# Patient Record
Sex: FEMALE | Race: BLACK OR AFRICAN AMERICAN | Employment: OTHER | URBAN - METROPOLITAN AREA
[De-identification: names, ages, dates, MRNs, and addresses within clinical notes are randomized per-mention and may not be internally consistent; named-entity substitution may affect disease eponyms.]

---

## 2017-04-05 DIAGNOSIS — N18.30 TYPE 2 DIABETES MELLITUS WITH STAGE 3 CHRONIC KIDNEY DISEASE, WITHOUT LONG-TERM CURRENT USE OF INSULIN (H): ICD-10-CM

## 2017-04-05 DIAGNOSIS — E11.22 TYPE 2 DIABETES MELLITUS WITH STAGE 3 CHRONIC KIDNEY DISEASE, WITHOUT LONG-TERM CURRENT USE OF INSULIN (H): ICD-10-CM

## 2017-04-05 LAB
ANION GAP SERPL CALCULATED.3IONS-SCNC: 10 MMOL/L (ref 3–14)
BUN SERPL-MCNC: 21 MG/DL (ref 7–30)
CALCIUM SERPL-MCNC: 9.8 MG/DL (ref 8.5–10.1)
CHLORIDE SERPL-SCNC: 106 MMOL/L (ref 94–109)
CHOLEST SERPL-MCNC: 161 MG/DL
CO2 SERPL-SCNC: 24 MMOL/L (ref 20–32)
CREAT SERPL-MCNC: 1.16 MG/DL (ref 0.52–1.04)
GFR SERPL CREATININE-BSD FRML MDRD: 46 ML/MIN/1.7M2
GLUCOSE SERPL-MCNC: 142 MG/DL (ref 70–99)
HBA1C MFR BLD: 7 % (ref 4.3–6)
HDLC SERPL-MCNC: 65 MG/DL
LDLC SERPL CALC-MCNC: 81 MG/DL
NONHDLC SERPL-MCNC: 96 MG/DL
POTASSIUM SERPL-SCNC: 4.6 MMOL/L (ref 3.4–5.3)
SODIUM SERPL-SCNC: 140 MMOL/L (ref 133–144)
TRIGL SERPL-MCNC: 76 MG/DL

## 2017-04-05 PROCEDURE — 80048 BASIC METABOLIC PNL TOTAL CA: CPT | Performed by: FAMILY MEDICINE

## 2017-04-05 PROCEDURE — 83036 HEMOGLOBIN GLYCOSYLATED A1C: CPT | Performed by: FAMILY MEDICINE

## 2017-04-05 PROCEDURE — 36415 COLL VENOUS BLD VENIPUNCTURE: CPT | Performed by: FAMILY MEDICINE

## 2017-04-05 PROCEDURE — 80061 LIPID PANEL: CPT | Performed by: FAMILY MEDICINE

## 2017-04-07 ENCOUNTER — OFFICE VISIT (OUTPATIENT)
Dept: INTERNAL MEDICINE | Facility: CLINIC | Age: 77
End: 2017-04-07
Payer: COMMERCIAL

## 2017-04-07 VITALS
BODY MASS INDEX: 35.89 KG/M2 | WEIGHT: 182.8 LBS | HEIGHT: 60 IN | TEMPERATURE: 98.2 F | DIASTOLIC BLOOD PRESSURE: 80 MMHG | SYSTOLIC BLOOD PRESSURE: 132 MMHG | HEART RATE: 89 BPM | OXYGEN SATURATION: 98 %

## 2017-04-07 DIAGNOSIS — I10 ESSENTIAL HYPERTENSION, BENIGN: ICD-10-CM

## 2017-04-07 DIAGNOSIS — K21.9 GASTROESOPHAGEAL REFLUX DISEASE WITHOUT ESOPHAGITIS: ICD-10-CM

## 2017-04-07 DIAGNOSIS — E11.22 TYPE 2 DIABETES MELLITUS WITH STAGE 3 CHRONIC KIDNEY DISEASE, WITHOUT LONG-TERM CURRENT USE OF INSULIN (H): Primary | ICD-10-CM

## 2017-04-07 DIAGNOSIS — N18.30 TYPE 2 DIABETES MELLITUS WITH STAGE 3 CHRONIC KIDNEY DISEASE, WITHOUT LONG-TERM CURRENT USE OF INSULIN (H): Primary | ICD-10-CM

## 2017-04-07 DIAGNOSIS — R20.2 PARESTHESIA OF LEFT FOOT: ICD-10-CM

## 2017-04-07 DIAGNOSIS — N18.30 TYPE 2 DIABETES MELLITUS WITH STAGE 3 CHRONIC KIDNEY DISEASE (H): ICD-10-CM

## 2017-04-07 DIAGNOSIS — E78.5 HYPERLIPIDEMIA LDL GOAL <100: ICD-10-CM

## 2017-04-07 DIAGNOSIS — N18.30 CKD (CHRONIC KIDNEY DISEASE) STAGE 3, GFR 30-59 ML/MIN (H): ICD-10-CM

## 2017-04-07 DIAGNOSIS — E11.22 TYPE 2 DIABETES MELLITUS WITH STAGE 3 CHRONIC KIDNEY DISEASE (H): ICD-10-CM

## 2017-04-07 DIAGNOSIS — E66.01 MORBID OBESITY, UNSPECIFIED OBESITY TYPE (H): ICD-10-CM

## 2017-04-07 PROCEDURE — 99214 OFFICE O/P EST MOD 30 MIN: CPT | Performed by: INTERNAL MEDICINE

## 2017-04-07 RX ORDER — PRAVASTATIN SODIUM 40 MG
40 TABLET ORAL DAILY
Qty: 90 TABLET | Refills: 3 | Status: SHIPPED | OUTPATIENT
Start: 2017-04-07 | End: 2017-11-02

## 2017-04-07 RX ORDER — GLIMEPIRIDE 2 MG/1
TABLET ORAL
Qty: 135 TABLET | Refills: 0 | Status: CANCELLED | OUTPATIENT
Start: 2017-04-07

## 2017-04-07 RX ORDER — PANTOPRAZOLE SODIUM 40 MG/1
40 TABLET, DELAYED RELEASE ORAL DAILY
Qty: 90 TABLET | Refills: 3 | Status: SHIPPED | OUTPATIENT
Start: 2017-04-07 | End: 2017-11-02

## 2017-04-07 RX ORDER — GLIMEPIRIDE 4 MG/1
4 TABLET ORAL
Qty: 90 TABLET | Refills: 3 | Status: SHIPPED | OUTPATIENT
Start: 2017-04-07 | End: 2017-10-09

## 2017-04-07 RX ORDER — METOPROLOL SUCCINATE 50 MG/1
50 TABLET, EXTENDED RELEASE ORAL DAILY
Qty: 90 TABLET | Refills: 3 | Status: SHIPPED | OUTPATIENT
Start: 2017-04-07 | End: 2017-11-02

## 2017-04-07 RX ORDER — BLOOD-GLUCOSE METER
KIT MISCELLANEOUS
Qty: 1 KIT | Status: CANCELLED | OUTPATIENT
Start: 2017-04-07

## 2017-04-07 RX ORDER — PIOGLITAZONEHYDROCHLORIDE 15 MG/1
15 TABLET ORAL DAILY
Qty: 90 TABLET | Refills: 3 | Status: SHIPPED | OUTPATIENT
Start: 2017-04-07 | End: 2017-11-02

## 2017-04-07 RX ORDER — AMLODIPINE AND BENAZEPRIL HYDROCHLORIDE 5; 20 MG/1; MG/1
1 CAPSULE ORAL DAILY
Qty: 90 CAPSULE | Refills: 3 | Status: SHIPPED | OUTPATIENT
Start: 2017-04-07 | End: 2017-11-02

## 2017-04-07 NOTE — PROGRESS NOTES
SUBJECTIVE:                                                    Janay Kyle is a 76 year old female who presents to clinic today for the following health issues:      Diabetes Follow-up    Patient is checking blood sugars: once daily.  Results are as follows:         am - 120-145. Taking glimepiride 4 mg in [place], no lows    Diabetic concerns: None     Symptoms of hypoglycemia (low blood sugar): none     Paresthesias (numbness or burning in feet) or sores: Yes burning left foot inner part      Date of last diabetic eye exam: completed in Sept     Hypertension Follow-up      Outpatient blood pressures are not being checked.    Low Salt Diet: no added salt       Amount of exercise or physical activity: walk     Problems taking medications regularly: No    Medication side effects: none    Diet: regular (no restrictions)      Other problems:   1. CKD: had jump in creatinine. She was in [place] for winter, was taking verapamil 20 mg once daily there.  2. Morbid obesity: no change in weight      Current concerns:   1. Pain left foot: this is a burning sensation that is only at the instep for a few weeks. It is not related to walking. She does not wear shoes in the house. Not in toes. Not up leg.       Patient Active Problem List   Diagnosis     Essential hypertension, benign     Dermatophytosis of nail     Esophageal reflux     Hip pain     HYPERLIPIDEMIA LDL GOAL <100     CKD (chronic kidney disease) stage 3, GFR 30-59 ml/min     Benign paroxysmal positional vertigo     Type 2 diabetes mellitus with diabetic chronic kidney disease (H)     Morbid obesity (H)     Type 2 diabetes mellitus with stage 3 chronic kidney disease, without long-term current use of insulin (H)       Current Outpatient Prescriptions   Medication Sig Dispense Refill     glimepiride (AMARYL) 2 MG tablet 1.5 tabs (3 mg) daily 135 tablet 0     blood glucose monitoring (FREESTYLE FREEDOM LITE) meter device kit Use to test blood sugars once daily as  directed. 1 kit PRN     acetaminophen (TYLENOL) 325 MG tablet Take 325-650 mg by mouth every 6 hours as needed for mild pain       pantoprazole (PROTONIX) 40 MG enteric coated tablet Take 1 tablet (40 mg) by mouth daily 90 tablet 0     pioglitazone (ACTOS) 15 MG tablet Take 1 tablet (15 mg) by mouth daily 90 tablet 0     metoprolol (TOPROL-XL) 50 MG 24 hr tablet Take 1 tablet (50 mg) by mouth daily 90 tablet 0     amLODIPine-benazepril (LOTREL) 5-20 MG per capsule Take 1 capsule by mouth daily 90 capsule 0     pravastatin (PRAVACHOL) 40 MG tablet Take 1 tablet (40 mg) by mouth daily 90 tablet 0     Ascorbic Acid (VITAMIN C CR) 1000 MG TBCR Take 1 tablet by mouth daily        aspirin 81 MG tablet Take 1 tablet (81 mg) by mouth daily       Calcium Citrate-Vitamin D (CALCIUM CITRATE + D) 315-200 MG-UNIT TABS Take 1 tablet by mouth 2 times daily.       Multiple Vitamin (DAILY MULTIVITAMIN PO) Take 1 tablet by mouth daily.         Social History   Substance Use Topics     Smoking status: Former Smoker     Smokeless tobacco: Never Used      Comment: quit 1995     Alcohol use No        ROS:  General: no fever, chills  Weight: stable  Vision:negative. Last eye exam 9/16.  ENT: negative  Respiratory negative.  Cardiac: no chest pain or pressure  Abdominal: no nausea, vomiting, abdominal pain, bowel changes  Vascular no complaints of claudication  Neurologic:on complaints of neuropathy  Feet no lesions, in grown nails, edema   : no polyuria, hematuria, dysuria    Objective:  Patient alert in NAD  /80 (BP Location: Right arm, Patient Position: Chair, Cuff Size: Adult Large)  Pulse 89  Temp 98.2  F (36.8  C) (Oral)  Ht 5' (1.524 m)  Wt 182 lb 12.8 oz (82.9 kg)  SpO2 98%  BMI 35.7 kg/m2       Wt Readings from Last 4 Encounters:   04/07/17 182 lb 12.8 oz (82.9 kg)   09/07/16 185 lb 11.2 oz (84.2 kg)   08/29/16 184 lb (83.5 kg)   08/13/16 181 lb (82.1 kg)       CV: CV: normal S1, S2 without murmur, S3 or S4.  Carotid  pulses: full  LUNGS: clear    Lab Results   Component Value Date    A1C 7.0 04/05/2017    A1C 7.1 09/20/2016    A1C 7.0 03/17/2016    A1C 7.0 09/14/2015    A1C 6.8 03/24/2015    A1C 6.8 09/23/2014    A1C 7.0 03/20/2014     Lab Results   Component Value Date    CHOL 161 04/05/2017    HDL 65 04/05/2017    LDL 81 04/05/2017    TRIG 76 04/05/2017    CHOLHDLRATIO 2.3 03/24/2015       ASSESSMENT:   (E11.22,  N18.3) Type 2 diabetes mellitus with stage 3 chronic kidney disease, without long-term current use of insulin (H)  (primary encounter diagnosis)  Comment: borderline control,   Plan: pioglitazone (ACTOS) 15 MG tablet, blood         glucose monitoring (NO BRAND SPECIFIED) test         strip, glimepiride (AMARYL) 4 MG tablet, blood         glucose monitoring (FREESTYLE LITE) test strip            (E66.01) Morbid obesity, unspecified obesity type (H)  Comment: stable  Plan: increase exercise    (I10) BENIGN HYPERTENSION  Comment: controlled but advised if she take the verapamil should take bid  Plan: metoprolol (TOPROL-XL) 50 MG 24 hr tablet,         amLODIPine-benazepril (LOTREL) 5-20 MG per         capsule, Creatinine            (N18.3) CKD (chronic kidney disease) stage 3, GFR 30-59 ml/min  Comment: increased creatinine, recheck in 3 weeks  Plan: Creatinine            (E78.5) Hyperlipidemia LDL goal <100  Comment: cotnrolled  Plan: pravastatin (PRAVACHOL) 40 MG tablet            (K21.9) Gastroesophageal reflux disease without esophagitis  Comment: stable  Plan: pantoprazole (PROTONIX) 40 MG EC tablet            (R20.2) Paresthesia of left foot  Comment: not likely diabetic, may be tarsal tunnel  Plan: wear shoes, refer podiatry if not improved    Brandi Rai MD  [facility]

## 2017-04-07 NOTE — NURSING NOTE
Chief Complaint   Patient presents with     Hypertension     follow up labs were done 4/5/17- also needs refills (pended for apporval)      Diabetes     follow up labs were done 4/5/17- also needs refills (pended for apporval)        Initial /80 (BP Location: Right arm, Patient Position: Chair, Cuff Size: Adult Large)  Pulse 89  Temp 98.2  F (36.8  C) (Oral)  Ht 5' (1.524 m)  Wt 182 lb 12.8 oz (82.9 kg)  SpO2 98%  BMI 35.7 kg/m2 Estimated body mass index is 35.7 kg/(m^2) as calculated from the following:    Height as of this encounter: 5' (1.524 m).    Weight as of this encounter: 182 lb 12.8 oz (82.9 kg).  Medication Reconciliation: complete

## 2017-04-07 NOTE — TELEPHONE ENCOUNTER
Need a new prescription for one touch ultra meter per insurance formulary.    Thank you,  Kae Galvin [facility]  [phone]

## 2017-04-07 NOTE — MR AVS SNAPSHOT
"              After Visit Summary   4/7/2017    Janay Kyle    MRN: [id number]           Patient Information     Date Of Birth          1940        Visit Information        Provider Department      4/7/2017 10:20 AM Brandi Rai MD [facility]        Today's Diagnoses     Type 2 diabetes mellitus with stage 3 chronic kidney disease, without long-term current use of insulin (H)        Gastroesophageal reflux disease without esophagitis        BENIGN HYPERTENSION        Hyperlipidemia LDL goal <100           Follow-ups after your visit        Follow-up notes from your care team     Return in about 3 weeks (around 4/28/2017) for Lab Work.      Who to contact     If you have questions or need follow up information about today's clinic visit or your schedule please contact [facility] directly at [phone].  Normal or non-critical lab and imaging results will be communicated to you by MyChart, letter or phone within 4 business days after the clinic has received the results. If you do not hear from us within 7 days, please contact the clinic through MyChart or phone. If you have a critical or abnormal lab result, we will notify you by phone as soon as possible.  Submit refill requests through Fr[employer]i or call your pharmacy and they will forward the refill request to us. Please allow 3 business days for your refill to be completed.          Additional Information About Your Visit        MyChart Information     Frameri lets you send messages to your doctor, view your test results, renew your prescriptions, schedule appointments and more. To sign up, go to [email or web address] . Click on \"Log in\" on the left side of the screen, which will take you to the Welcome page. Then click on \"Sign up Now\" on the right side of the page.     You will be asked to enter the access code listed below, as well as some personal information. Please follow the directions to create your username " and password.     Your access code is: [email or web address]  Expires: 2017 11:14 AM     Your access code will  in 90 days. If you need help or a new code, please call your [place] clinic or [phone].        Care EveryWhere ID     This is your Care EveryWhere ID. This could be used by other organizations to access your [place] medical records  [id number]        Your Vitals Were     Pulse Temperature Height Pulse Oximetry BMI (Body Mass Index)       89 98.2  F (36.8  C) (Oral) 5' (1.524 m) 98% 35.7 kg/m2        Blood Pressure from Last 3 Encounters:   17 132/80   16 129/77   16 110/70    Weight from Last 3 Encounters:   17 182 lb 12.8 oz (82.9 kg)   16 185 lb 11.2 oz (84.2 kg)   16 184 lb (83.5 kg)              Today, you had the following     No orders found for display         Today's Medication Changes          These changes are accurate as of: 17 11:14 AM.  If you have any questions, ask your nurse or doctor.               Start taking these medicines.        Dose/Directions    blood glucose monitoring test strip   Commonly known as:  no brand specified   Used for:  Type 2 diabetes mellitus with stage 3 chronic kidney disease, without long-term current use of insulin (H)   Started by:  Brandi Rai MD        Use to test blood sugars 1 times daily or as directed   Quantity:  100 strip   Refills:  3         These medicines have changed or have updated prescriptions.        Dose/Directions    * glimepiride 2 MG tablet   Commonly known as:  AMARYL   This may have changed:  Another medication with the same name was added. Make sure you understand how and when to take each.   Used for:  Type 2 diabetes mellitus with stage 3 chronic kidney disease, without long-term current use of insulin (H)        1.5 tabs (3 mg) daily   Quantity:  135 tablet   Refills:  0       * glimepiride 4 MG tablet   Commonly known as:  AMARYL   This may have changed:  You were already taking a  medication with the same name, and this prescription was added. Make sure you understand how and when to take each.   Used for:  Type 2 diabetes mellitus with stage 3 chronic kidney disease, without long-term current use of insulin (H)   Changed by:  Brandi Rai MD        Dose:  4 mg   Take 1 tablet (4 mg) by mouth every morning (before breakfast)   Quantity:  90 tablet   Refills:  3       * Notice:  This list has 2 medication(s) that are the same as other medications prescribed for you. Read the directions carefully, and ask your doctor or other care provider to review them with you.         Where to get your medicines      These medications were sent to [facility] [place], [place] - [address] [facility].  [address] [facility] [address]     Phone:  [phone]     amLODIPine-benazepril 5-20 MG per capsule    blood glucose monitoring test strip    glimepiride 4 MG tablet    metoprolol 50 MG 24 hr tablet    pantoprazole 40 MG EC tablet    pioglitazone 15 MG tablet    pravastatin 40 MG tablet                Primary Care Provider Office Phone # Fax #    rBandi Rai [phone]       [facility] [address]  [facility] [address]        Thank you!     Thank you for choosing [facility]  for your care. Our goal is always to provide you with excellent care. Hearing back from our patients is one way we can continue to improve our services. Please take a few minutes to complete the written survey that you may receive in the mail after your visit with us. Thank you!             Your Updated Medication List - Protect others around you: Learn how to safely use, store and throw away your medicines at www.disposemymeds.org.          This list is accurate as of: 4/7/17 11:14 AM.  Always use your most recent med list.                   Brand Name Dispense Instructions for use    amLODIPine-benazepril 5-20 MG per capsule    LOTREL    90 capsule     Take 1 capsule by mouth daily       aspirin 81 MG tablet      Take 1 tablet (81 mg) by mouth daily       blood glucose monitoring meter device kit     1 kit    Use to test blood sugars once daily as directed.       blood glucose monitoring test strip    no brand specified    100 strip    Use to test blood sugars 1 times daily or as directed       calcium citrate + D 315-200 MG-UNIT Tabs per tablet   Generic drug:  calcium citrate-vitamin D      Take 1 tablet by mouth 2 times daily.       DAILY MULTIVITAMIN PO      Take 1 tablet by mouth daily.       * glimepiride 2 MG tablet    AMARYL    135 tablet    1.5 tabs (3 mg) daily       * glimepiride 4 MG tablet    AMARYL    90 tablet    Take 1 tablet (4 mg) by mouth every morning (before breakfast)       metoprolol 50 MG 24 hr tablet    TOPROL-XL    90 tablet    Take 1 tablet (50 mg) by mouth daily       pantoprazole 40 MG EC tablet    PROTONIX    90 tablet    Take 1 tablet (40 mg) by mouth daily       pioglitazone 15 MG tablet    ACTOS    90 tablet    Take 1 tablet (15 mg) by mouth daily       pravastatin 40 MG tablet    PRAVACHOL    90 tablet    Take 1 tablet (40 mg) by mouth daily       TYLENOL 325 MG tablet   Generic drug:  acetaminophen      Take 325-650 mg by mouth every 6 hours as needed for mild pain       vitamin C CR 1000 MG Tbcr      Take 1 tablet by mouth daily       * Notice:  This list has 2 medication(s) that are the same as other medications prescribed for you. Read the directions carefully, and ask your doctor or other care provider to review them with you.

## 2017-04-18 RX ORDER — BLOOD-GLUCOSE METER
EACH MISCELLANEOUS
Qty: 1 KIT | Refills: 0 | Status: SHIPPED | OUTPATIENT
Start: 2017-04-18 | End: 2017-10-09

## 2017-04-18 NOTE — TELEPHONE ENCOUNTER
Per message from Gissel Garcia it's Kae at [place]. I was checking on the status for Janay Kyle's glucose meter. It looks like you had a question on which meter. It is the one touch ultra 2       Sent new rx for meter to pharm

## 2017-04-19 ENCOUNTER — TELEPHONE (OUTPATIENT)
Dept: PHARMACY | Facility: CLINIC | Age: 77
End: 2017-04-19

## 2017-05-01 DIAGNOSIS — N18.30 TYPE 2 DIABETES MELLITUS WITH STAGE 3 CHRONIC KIDNEY DISEASE, WITHOUT LONG-TERM CURRENT USE OF INSULIN (H): ICD-10-CM

## 2017-05-01 DIAGNOSIS — N18.30 CKD (CHRONIC KIDNEY DISEASE) STAGE 3, GFR 30-59 ML/MIN (H): ICD-10-CM

## 2017-05-01 DIAGNOSIS — I10 ESSENTIAL HYPERTENSION, BENIGN: Primary | ICD-10-CM

## 2017-05-01 DIAGNOSIS — E11.22 TYPE 2 DIABETES MELLITUS WITH STAGE 3 CHRONIC KIDNEY DISEASE, WITHOUT LONG-TERM CURRENT USE OF INSULIN (H): ICD-10-CM

## 2017-05-01 LAB
CREAT SERPL-MCNC: 1.07 MG/DL (ref 0.52–1.04)
GFR SERPL CREATININE-BSD FRML MDRD: 50 ML/MIN/1.7M2

## 2017-05-01 PROCEDURE — 82565 ASSAY OF CREATININE: CPT | Performed by: INTERNAL MEDICINE

## 2017-05-01 PROCEDURE — 36415 COLL VENOUS BLD VENIPUNCTURE: CPT | Performed by: INTERNAL MEDICINE

## 2017-10-05 DIAGNOSIS — E11.22 TYPE 2 DIABETES MELLITUS WITH STAGE 3 CHRONIC KIDNEY DISEASE, WITHOUT LONG-TERM CURRENT USE OF INSULIN (H): ICD-10-CM

## 2017-10-05 DIAGNOSIS — N18.30 CKD (CHRONIC KIDNEY DISEASE) STAGE 3, GFR 30-59 ML/MIN (H): ICD-10-CM

## 2017-10-05 DIAGNOSIS — N18.30 TYPE 2 DIABETES MELLITUS WITH STAGE 3 CHRONIC KIDNEY DISEASE, WITHOUT LONG-TERM CURRENT USE OF INSULIN (H): ICD-10-CM

## 2017-10-05 DIAGNOSIS — I10 ESSENTIAL HYPERTENSION, BENIGN: ICD-10-CM

## 2017-10-05 LAB
ANION GAP SERPL CALCULATED.3IONS-SCNC: 7 MMOL/L (ref 3–14)
BUN SERPL-MCNC: 16 MG/DL (ref 7–30)
CALCIUM SERPL-MCNC: 9.5 MG/DL (ref 8.5–10.1)
CHLORIDE SERPL-SCNC: 106 MMOL/L (ref 94–109)
CO2 SERPL-SCNC: 28 MMOL/L (ref 20–32)
CREAT SERPL-MCNC: 1.04 MG/DL (ref 0.52–1.04)
CREAT UR-MCNC: 125 MG/DL
GFR SERPL CREATININE-BSD FRML MDRD: 52 ML/MIN/1.7M2
GLUCOSE SERPL-MCNC: 150 MG/DL (ref 70–99)
HBA1C MFR BLD: 7.3 % (ref 4.3–6)
MICROALBUMIN UR-MCNC: 22 MG/L
MICROALBUMIN/CREAT UR: 17.2 MG/G CR (ref 0–25)
POTASSIUM SERPL-SCNC: 4 MMOL/L (ref 3.4–5.3)
SODIUM SERPL-SCNC: 141 MMOL/L (ref 133–144)

## 2017-10-05 PROCEDURE — 83036 HEMOGLOBIN GLYCOSYLATED A1C: CPT | Performed by: INTERNAL MEDICINE

## 2017-10-05 PROCEDURE — 36415 COLL VENOUS BLD VENIPUNCTURE: CPT | Performed by: INTERNAL MEDICINE

## 2017-10-05 PROCEDURE — 82043 UR ALBUMIN QUANTITATIVE: CPT | Performed by: INTERNAL MEDICINE

## 2017-10-05 PROCEDURE — 80048 BASIC METABOLIC PNL TOTAL CA: CPT | Performed by: INTERNAL MEDICINE

## 2017-10-09 ENCOUNTER — OFFICE VISIT (OUTPATIENT)
Dept: INTERNAL MEDICINE | Facility: CLINIC | Age: 77
End: 2017-10-09
Payer: COMMERCIAL

## 2017-10-09 ENCOUNTER — TELEPHONE (OUTPATIENT)
Dept: INTERNAL MEDICINE | Facility: CLINIC | Age: 77
End: 2017-10-09

## 2017-10-09 VITALS
RESPIRATION RATE: 16 BRPM | DIASTOLIC BLOOD PRESSURE: 82 MMHG | HEART RATE: 90 BPM | BODY MASS INDEX: 36.75 KG/M2 | OXYGEN SATURATION: 98 % | SYSTOLIC BLOOD PRESSURE: 142 MMHG | TEMPERATURE: 97.5 F | HEIGHT: 60 IN | WEIGHT: 187.2 LBS

## 2017-10-09 DIAGNOSIS — N18.30 TYPE 2 DIABETES MELLITUS WITH STAGE 3 CHRONIC KIDNEY DISEASE, WITHOUT LONG-TERM CURRENT USE OF INSULIN (H): ICD-10-CM

## 2017-10-09 DIAGNOSIS — N18.30 CKD (CHRONIC KIDNEY DISEASE) STAGE 3, GFR 30-59 ML/MIN (H): ICD-10-CM

## 2017-10-09 DIAGNOSIS — R35.0 URINARY FREQUENCY: ICD-10-CM

## 2017-10-09 DIAGNOSIS — K21.9 GASTROESOPHAGEAL REFLUX DISEASE WITHOUT ESOPHAGITIS: ICD-10-CM

## 2017-10-09 DIAGNOSIS — I10 ESSENTIAL HYPERTENSION, BENIGN: ICD-10-CM

## 2017-10-09 DIAGNOSIS — E11.22 TYPE 2 DIABETES MELLITUS WITH STAGE 3 CHRONIC KIDNEY DISEASE, WITHOUT LONG-TERM CURRENT USE OF INSULIN (H): ICD-10-CM

## 2017-10-09 DIAGNOSIS — E78.5 HYPERLIPIDEMIA LDL GOAL <100: ICD-10-CM

## 2017-10-09 DIAGNOSIS — N18.30 TYPE 2 DIABETES MELLITUS WITH STAGE 3 CHRONIC KIDNEY DISEASE, WITHOUT LONG-TERM CURRENT USE OF INSULIN (H): Primary | ICD-10-CM

## 2017-10-09 DIAGNOSIS — Z23 NEED FOR PROPHYLACTIC VACCINATION AND INOCULATION AGAINST INFLUENZA: ICD-10-CM

## 2017-10-09 DIAGNOSIS — E11.22 TYPE 2 DIABETES MELLITUS WITH STAGE 3 CHRONIC KIDNEY DISEASE, WITHOUT LONG-TERM CURRENT USE OF INSULIN (H): Primary | ICD-10-CM

## 2017-10-09 DIAGNOSIS — E66.01 MORBID OBESITY (H): ICD-10-CM

## 2017-10-09 LAB
ALBUMIN UR-MCNC: NEGATIVE MG/DL
APPEARANCE UR: CLEAR
BACTERIA #/AREA URNS HPF: ABNORMAL /HPF
BILIRUB UR QL STRIP: NEGATIVE
COLOR UR AUTO: YELLOW
GLUCOSE UR STRIP-MCNC: 100 MG/DL
HGB UR QL STRIP: ABNORMAL
KETONES UR STRIP-MCNC: NEGATIVE MG/DL
LEUKOCYTE ESTERASE UR QL STRIP: NEGATIVE
NITRATE UR QL: NEGATIVE
NON-SQ EPI CELLS #/AREA URNS LPF: ABNORMAL /LPF
PH UR STRIP: 5.5 PH (ref 5–7)
RBC #/AREA URNS AUTO: ABNORMAL /HPF
SOURCE: ABNORMAL
SP GR UR STRIP: 1.01 (ref 1–1.03)
UROBILINOGEN UR STRIP-ACNC: 0.2 EU/DL (ref 0.2–1)
WBC #/AREA URNS AUTO: ABNORMAL /HPF

## 2017-10-09 PROCEDURE — G0008 ADMIN INFLUENZA VIRUS VAC: HCPCS | Performed by: INTERNAL MEDICINE

## 2017-10-09 PROCEDURE — 81001 URINALYSIS AUTO W/SCOPE: CPT | Performed by: INTERNAL MEDICINE

## 2017-10-09 PROCEDURE — 99214 OFFICE O/P EST MOD 30 MIN: CPT | Mod: 25 | Performed by: INTERNAL MEDICINE

## 2017-10-09 PROCEDURE — 90662 IIV NO PRSV INCREASED AG IM: CPT | Performed by: INTERNAL MEDICINE

## 2017-10-09 RX ORDER — TOLTERODINE 4 MG/1
4 CAPSULE, EXTENDED RELEASE ORAL DAILY
Qty: 90 CAPSULE | Refills: 1 | Status: SHIPPED | OUTPATIENT
Start: 2017-10-09 | End: 2017-11-03

## 2017-10-09 RX ORDER — GLIMEPIRIDE 4 MG/1
TABLET ORAL
Qty: 135 TABLET | Refills: 3 | Status: SHIPPED | OUTPATIENT
Start: 2017-10-09 | End: 2018-10-08

## 2017-10-09 NOTE — MR AVS SNAPSHOT
After Visit Summary   10/9/2017    Janay Kyle    MRN: [id number]           Patient Information     Date Of Birth          1940        Visit Information        Provider Department      10/9/2017 10:40 AM Brandi Rai MD [facility]        Today's Diagnoses     Need for prophylactic vaccination and inoculation against influenza    -  1    Type 2 diabetes mellitus with stage 3 chronic kidney disease, without long-term current use of insulin (H)        Urinary frequency          Care Instructions    Take the new medication for bladder at night. If it seems to help decrease the number of times you go to the bathroom, call and I can order the 90 day supply.     Call in 1-2 BP checks in 10 days or so.     Increase glimepiride to 1 and 1/2 tablets in the morning.     Occasionally check the blood sugar in the evening before supper or bedtime when you have not eaten in about 4 hours to be sure these are improving since your morning numbers are looking okay.          Follow-ups after your visit        Follow-up notes from your care team     Return in about 6 months (around 4/9/2018).      Who to contact     If you have questions or need follow up information about today's clinic visit or your schedule please contact [facility] directly at [phone].  Normal or non-critical lab and imaging results will be communicated to you by MyChart, letter or phone within 4 business days after the clinic has received the results. If you do not hear from us within 7 days, please contact the clinic through [employer] Connection[employer] or phone. If you have a critical or abnormal lab result, we will notify you by phone as soon as possible.  Submit refill requests through [email or web address] or call your pharmacy and they will forward the refill request to us. Please allow 3 business days for your refill to be completed.          Additional Information About Your Visit        MyChart Information     [email or web address] lets you  "send messages to your doctor, view your test results, renew your prescriptions, schedule appointments and more. To sign up, go to [email or web address] . Click on \"Log in\" on the left side of the screen, which will take you to the Welcome page. Then click on \"Sign up Now\" on the right side of the page.     You will be asked to enter the access code listed below, as well as some personal information. Please follow the directions to create your username and password.     Your access code is: [email or web address]  Expires: 2018 11:25 AM     Your access code will  in 90 days. If you need help or a new code, please call your [place] clinic or [phone].        Care EveryWhere ID     This is your [facility] EveryWhere ID. This could be used by other organizations to access your [place] medical records  [id number]        Your Vitals Were     Pulse Temperature Respirations Height Pulse Oximetry BMI (Body Mass Index)    90 97.5  F (36.4  C) (Oral) 16 5' (1.524 m) 98% 36.56 kg/m2       Blood Pressure from Last 3 Encounters:   10/09/17 142/82   17 132/80   16 129/77    Weight from Last 3 Encounters:   10/09/17 187 lb 3.2 oz (84.9 kg)   17 182 lb 12.8 oz (82.9 kg)   16 185 lb 11.2 oz (84.2 kg)              We Performed the Following     *UA reflex to Microscopic and Culture ([place] and [facility] (except [place] and [place])     ADMIN INFLUENZA (For MEDICARE Patients ONLY) []     FLU VACCINE, INCREASED ANTIGEN, PRESV FREE, AGE 65+ [20563]     Vaccine Administration, Initial [94912]          Today's Medication Changes          These changes are accurate as of: 10/9/17 11:25 AM.  If you have any questions, ask your nurse or doctor.               Start taking these medicines.        Dose/Directions    tolterodine 4 MG 24 hr capsule   Commonly known as:  DETROL LA   Used for:  Urinary frequency   Started by:  Brandi Rai MD        Dose:  4 mg   Take 1 capsule (4 mg) by mouth daily "   Quantity:  90 capsule   Refills:  1         These medicines have changed or have updated prescriptions.        Dose/Directions    blood glucose monitoring meter device kit   This may have changed:  Another medication with the same name was removed. Continue taking this medication, and follow the directions you see here.   Used for:  Type 2 diabetes mellitus with stage 3 chronic kidney disease (H)   Changed by:  Edith Jennings, [facility]        Use to test blood sugars once daily as directed.   Quantity:  1 kit   Refills:  PRN       glimepiride 4 MG tablet   Commonly known as:  AMARYL   This may have changed:    - how much to take  - how to take this  - when to take this  - additional instructions  - Another medication with the same name was removed. Continue taking this medication, and follow the directions you see here.   Used for:  Type 2 diabetes mellitus with stage 3 chronic kidney disease, without long-term current use of insulin (H)   Changed by:  Brandi Rai MD        1 and 1/2 tablets daily   Quantity:  135 tablet   Refills:  3            Where to get your medicines      These medications were sent to [place], [place] - [address]  [facility] [address] [place] [id number]     Phone:  [phone]     glimepiride 4 MG tablet    tolterodine 4 MG 24 hr capsule                Primary Care Provider Office Phone # Fax #    Brandi Rai [phone]       [address]  [facility] [address]        Equal Access to Services     ALEXA [facility] AH: Hadii terry ku hadasho Soomaali, waaxda luqadaha, qaybta kaalmada adeegyada, maxime love hayvera ramsey . So [facility] [phone].    ATENCIÓN: Si habla español, tiene a christianson disposición servicios gratuitos de asistencia lingüística. Llame al [phone].    We comply with applicable federal civil rights laws and [place] laws. We do not discriminate on the basis of race, color, national origin, age,  disability, sex, sexual orientation, or gender identity.            Thank you!     Thank you for choosing [facility]  for your care. Our goal is always to provide you with excellent care. Hearing back from our patients is one way we can continue to improve our services. Please take a few minutes to complete the written survey that you may receive in the mail after your visit with us. Thank you!             Your Updated Medication List - Protect others around you: Learn how to safely use, store and throw away your medicines at www.disposemymeds.org.          This list is accurate as of: 10/9/17 11:25 AM.  Always use your most recent med list.                   Brand Name Dispense Instructions for use Diagnosis    amLODIPine-benazepril 5-20 MG per capsule    LOTREL    90 capsule    Take 1 capsule by mouth daily    Essential hypertension, benign       aspirin 81 MG tablet      Take 1 tablet (81 mg) by mouth daily        blood glucose monitoring meter device kit     1 kit    Use to test blood sugars once daily as directed.    Type 2 diabetes mellitus with stage 3 chronic kidney disease (H)       * blood glucose monitoring test strip    no brand specified    100 strip    Use to test blood sugars 1 times daily or as directed    Type 2 diabetes mellitus with stage 3 chronic kidney disease, without long-term current use of insulin (H)       * blood glucose monitoring test strip    FREESTYLE LITE    100 strip    Use to test blood sugars 1 times daily or as directed.    Type 2 diabetes mellitus with stage 3 chronic kidney disease, without long-term current use of insulin (H)       calcium citrate + D 315-200 MG-UNIT Tabs per tablet   Generic drug:  calcium citrate-vitamin D      Take 1 tablet by mouth 2 times daily.        DAILY MULTIVITAMIN PO      Take 1 tablet by mouth daily.        glimepiride 4 MG tablet    AMARYL    135 tablet    1 and 1/2 tablets daily    Type 2 diabetes mellitus with stage 3 chronic  kidney disease, without long-term current use of insulin (H)       metoprolol 50 MG 24 hr tablet    TOPROL-XL    90 tablet    Take 1 tablet (50 mg) by mouth daily    Essential hypertension, benign       pantoprazole 40 MG EC tablet    PROTONIX    90 tablet    Take 1 tablet (40 mg) by mouth daily    Gastroesophageal reflux disease without esophagitis       pioglitazone 15 MG tablet    ACTOS    90 tablet    Take 1 tablet (15 mg) by mouth daily    Type 2 diabetes mellitus with stage 3 chronic kidney disease, without long-term current use of insulin (H)       pravastatin 40 MG tablet    PRAVACHOL    90 tablet    Take 1 tablet (40 mg) by mouth daily    Hyperlipidemia LDL goal <100       tolterodine 4 MG 24 hr capsule    DETROL LA    90 capsule    Take 1 capsule (4 mg) by mouth daily    Urinary frequency       TYLENOL 325 MG tablet   Generic drug:  acetaminophen      Take 325-650 mg by mouth every 6 hours as needed for mild pain        vitamin C CR 1000 MG Tbcr      Take 1 tablet by mouth daily        * Notice:  This list has 2 medication(s) that are the same as other medications prescribed for you. Read the directions carefully, and ask your doctor or other care provider to review them with you.

## 2017-10-09 NOTE — TELEPHONE ENCOUNTER
Dr. Rai composed a letter to [facility] and would like faced to them in order to fill patients RX's because she will be traveling to [place] for 6 months. Spoke to Fannie at [facility] and she does not have a fax number for [facility].  Called Provider line at [facility] and was transferred multiple times without success.  Before I research further I left a voicemail for patient to see if they had already spoke to [facility] and if they were given a fax number.  Letter is on my desk until she calls back.

## 2017-10-09 NOTE — LETTER
[facility]  [address], [address]  [place], [place]  [address]                                            TEL:[phone]  FAX:[phone]      10/9/2017     Re: Janay Kyle 1940       To Whom it May Concerns:     Ms. Kyle is under my care. She will be traveling to [place] from 11/6/17 to 4/6/18. Please allow her pharmacy to fill 6 months of medications to take with her.         Sincerely,         Brandi Rai MD

## 2017-10-09 NOTE — PROGRESS NOTES
SUBJECTIVE:   Jaany Kyle is a 76 year old female who presents to clinic today for the following health issues:      Diabetes Follow-up    Patient is checking blood sugars:1 times daily.    Blood sugar testing frequency justification: Insurance not covering for DM supplies.  they are giving her 50 at a time only, not 100 for 3 months.   Results are as follows:         120, 121, 140 am readings only    Diabetic concerns: None     Symptoms of hypoglycemia (low blood sugar): weak, sweaty     Paresthesias (numbness or burning in feet) or sores: Yes occasional bilateral feet     Date of last diabetic eye exam: 10/2016 due in 2 weeks    Hyperlipidemia Follow-Up      Rate your low fat/cholesterol diet?: fair    Taking statin?  Yes, muscle aches, possibly from other cause    Other lipid medications/supplements?:  none    Hypertension Follow-up      Outpatient blood pressures are not being checked.    Low Salt Diet: low salt        Amount of exercise or physical activity: 6-7 days/week for an average of 15-30 minutes walking    Problems taking medications regularly: No    Medication side effects: muscle aches  Diet: regular (no restrictions)        Other problems:   1. CKD: had labs  2. GERD: stable        Current concerns:   1. Urinary frequency for over a year but worsening. She also has been having incontinence for about a month. This is after feels urgency and stands up. She gets up about 7 times a night and very frequent during the day. Sometimes there is odor. No burning, no blood.   2. Stiffness in am: affecting back mostly but upper legs also, about 1 time a month. She thinks it is more likely after more activity like sweeping.       Patient Active Problem List   Diagnosis     Essential hypertension, benign     Dermatophytosis of nail     Esophageal reflux     Hip pain     HYPERLIPIDEMIA LDL GOAL <100     CKD (chronic kidney disease) stage 3, GFR 30-59 ml/min     Benign paroxysmal positional vertigo     Morbid  obesity (H)     Type 2 diabetes mellitus with stage 3 chronic kidney disease, without long-term current use of insulin (H)       Current Outpatient Prescriptions   Medication Sig Dispense Refill     pantoprazole (PROTONIX) 40 MG EC tablet Take 1 tablet (40 mg) by mouth daily 90 tablet 3     pioglitazone (ACTOS) 15 MG tablet Take 1 tablet (15 mg) by mouth daily 90 tablet 3     metoprolol (TOPROL-XL) 50 MG 24 hr tablet Take 1 tablet (50 mg) by mouth daily 90 tablet 3     amLODIPine-benazepril (LOTREL) 5-20 MG per capsule Take 1 capsule by mouth daily 90 capsule 3     pravastatin (PRAVACHOL) 40 MG tablet Take 1 tablet (40 mg) by mouth daily 90 tablet 3     glimepiride (AMARYL) 4 MG tablet Take 1 tablet (4 mg) by mouth every morning (before breakfast) 90 tablet 3     acetaminophen (TYLENOL) 325 MG tablet Take 325-650 mg by mouth every 6 hours as needed for mild pain       Ascorbic Acid (VITAMIN C CR) 1000 MG TBCR Take 1 tablet by mouth daily        aspirin 81 MG tablet Take 1 tablet (81 mg) by mouth daily       Calcium Citrate-Vitamin D (CALCIUM CITRATE + D) 315-200 MG-UNIT TABS Take 1 tablet by mouth 2 times daily.       Multiple Vitamin (DAILY MULTIVITAMIN PO) Take 1 tablet by mouth daily.         Social History   Substance Use Topics     Smoking status: Former Smoker     Smokeless tobacco: Never Used      Comment: quit 1995     Alcohol use No        ROS:  General: no fever, chills  Weight: up a little  Vision:negative. Last eye exam 10/16, scheduled.  ENT: negative  Respiratory negative.  Cardiac: no chest pain or pressure  Abdominal: no nausea, vomiting, abdominal pain, bowel changes  Vascular no complaints of claudication  Neurologic:no complaints of neuropathy  Feet no lesions, in grown nails, edema   : no polyuria, hematuria, dysuria    Objective:  Patient alert in NAD  /82  Pulse 90  Temp 97.5  F (36.4  C) (Oral)  Resp 16  Ht 5' (1.524 m)  Wt 187 lb 3.2 oz (84.9 kg)  SpO2 98%  BMI 36.56 kg/m2        Wt Readings from Last 4 Encounters:   10/09/17 187 lb 3.2 oz (84.9 kg)   04/07/17 182 lb 12.8 oz (82.9 kg)   09/07/16 185 lb 11.2 oz (84.2 kg)   08/29/16 184 lb (83.5 kg)       CV: CV: normal S1, S2 without murmur, S3 or S4.  Carotid pulses: full  LUNGS: clear  Feet: pulses full, normal capillary refill  No lesions, sores or skin changes  Nails normal  Sensation able to feel fine filament    Lab Results   Component Value Date    A1C 7.3 10/05/2017    A1C 7.0 04/05/2017    A1C 7.1 09/20/2016    A1C 7.0 03/17/2016    A1C 7.0 09/14/2015    A1C 6.8 03/24/2015    A1C 6.8 09/23/2014    A1C 7.0 03/20/2014    A1C 6.3 09/11/2013     Lab Results   Component Value Date    CHOL 161 04/05/2017    HDL 65 04/05/2017    LDL 81 04/05/2017    TRIG 76 04/05/2017    CHOLHDLRATIO 2.3 03/24/2015             ASSESSMENT/PLAN:       1. Type 2 diabetes mellitus with stage 3 chronic kidney disease, without long-term current use of insulin (H)  Not optimal, a1c increased. Increase glimepiride, work on diet  - glimepiride (AMARYL) 4 MG tablet; 1 and 1/2 tablets daily  Dispense: 135 tablet; Refill: 3  - blood glucose monitoring (NO BRAND SPECIFIED) test strip; Use to test blood sugar 1 times daily  Dispense: 100 each; Refill: 3  - Basic metabolic panel; Future  - Hemoglobin A1c; Future    2. Morbid obesity (H)  Stable, work on diet, exercise    3. Essential hypertension, benign  Higher today, do a few checks and call in 10 days, may need to increase medication  - Basic metabolic panel; Future    4. CKD (chronic kidney disease) stage 3, GFR 30-59 ml/min  stable  - Basic metabolic panel; Future    5. Hyperlipidemia LDL goal <100  controlled  - Lipid panel reflex to direct LDL; Future    6. Gastroesophageal reflux disease without esophagitis  stable    7. Urinary frequency  Recommend trial detrol, likely incontinence due to pelvic muscle weakness but check UA to be sure not infection  - tolterodine (DETROL LA) 4 MG 24 hr capsule; Take 1 capsule  (4 mg) by mouth daily  Dispense: 90 capsule; Refill: 1  - *UA reflex to Microscopic and Culture ([facility] (except [place] and [place])    8. Need for prophylactic vaccination and inoculation against influenza    - FLU VACCINE, INCREASED ANTIGEN, PRESV FREE, AGE 65+ [81679]  - Vaccine Administration, Initial [92041]  - ADMIN INFLUENZA (For MEDICARE Patients ONLY) []        Brandi Rai MD  [facility]  Injectable Influenza Immunization Documentation    1.  Is the person to be vaccinated sick today?   No    2. Does the person to be vaccinated have an allergy to a component   of the vaccine?   No    3. Has the person to be vaccinated ever had a serious reaction   to influenza vaccine in the past?   No    4. Has the person to be vaccinated ever had Guillain-Barré syndrome?   No    Form completed by  Anthony Dunne, [facility]

## 2017-10-09 NOTE — NURSING NOTE
Chief Complaint   Patient presents with     RECHECK     LOV 04/07/2017: HTN, LDL, DM      Imm/Inj     Urge Incontinence     body aches and stiffness- urgency to urinate sx for 1 month.        Initial /82  Pulse 90  Temp 97.5  F (36.4  C) (Oral)  Resp 16  Ht 5' (1.524 m)  Wt 187 lb 3.2 oz (84.9 kg)  SpO2 98%  BMI 36.56 kg/m2 Estimated body mass index is 36.56 kg/(m^2) as calculated from the following:    Height as of this encounter: 5' (1.524 m).    Weight as of this encounter: 187 lb 3.2 oz (84.9 kg).  Medication Reconciliation: complete      Anthony Dunne, CMA

## 2017-10-09 NOTE — PATIENT INSTRUCTIONS
Take the new medication for bladder at night. If it seems to help decrease the number of times you go to the bathroom, call and I can order the 90 day supply.     Call in 1-2 BP checks in 10 days or so.     Increase glimepiride to 1 and 1/2 tablets in the morning.     Occasionally check the blood sugar in the evening before supper or bedtime when you have not eaten in about 4 hours to be sure these are improving since your morning numbers are looking okay.

## 2017-10-24 ENCOUNTER — TELEPHONE (OUTPATIENT)
Dept: INTERNAL MEDICINE | Facility: CLINIC | Age: 77
End: 2017-10-24

## 2017-10-24 NOTE — TELEPHONE ENCOUNTER
Advise I think the sugars are doing well, continue current medication. The BP is staying up so I recommend she go up on the metoprolol to 2 tablets daily (can take together). She should call in a couple BP checks next Thursday (I am gone the 3rd through 6th).

## 2017-10-24 NOTE — TELEPHONE ENCOUNTER
Sue calls with some BP readings and Diabetes     BP, 3 random readings different times of the day.     144/86  142/80  146/82    BS starting on 10/13:  Fasting   119  120  141  141, recheck at 2:30 pm, 105  116  130  108    Sue states the change in the Bladder medication helped. She gets up 3 times a night instead of 7 times a night.     Pt is leaving for [place] on 11/7 for the winter.

## 2017-11-02 ENCOUNTER — TELEPHONE (OUTPATIENT)
Dept: INTERNAL MEDICINE | Facility: CLINIC | Age: 77
End: 2017-11-02

## 2017-11-02 DIAGNOSIS — I10 ESSENTIAL HYPERTENSION, BENIGN: ICD-10-CM

## 2017-11-02 RX ORDER — METOPROLOL SUCCINATE 50 MG/1
100 TABLET, EXTENDED RELEASE ORAL DAILY
Qty: 180 TABLET | Refills: 1 | Status: SHIPPED | OUTPATIENT
Start: 2017-11-02 | End: 2018-04-27

## 2017-11-02 RX ORDER — AMLODIPINE AND BENAZEPRIL HYDROCHLORIDE 5; 20 MG/1; MG/1
1 CAPSULE ORAL DAILY
Qty: 90 CAPSULE | Refills: 2 | Status: SHIPPED | OUTPATIENT
Start: 2017-11-02 | End: 2018-10-08

## 2017-11-02 RX ORDER — PANTOPRAZOLE SODIUM 40 MG/1
40 TABLET, DELAYED RELEASE ORAL DAILY
Qty: 90 TABLET | Refills: 3 | Status: SHIPPED | OUTPATIENT
Start: 2017-11-02 | End: 2018-05-02

## 2017-11-02 RX ORDER — PRAVASTATIN SODIUM 40 MG
40 TABLET ORAL DAILY
Qty: 90 TABLET | Refills: 2 | Status: SHIPPED | OUTPATIENT
Start: 2017-11-02 | End: 2018-10-08

## 2017-11-02 RX ORDER — PIOGLITAZONEHYDROCHLORIDE 15 MG/1
15 TABLET ORAL DAILY
Qty: 90 TABLET | Refills: 2 | Status: SHIPPED | OUTPATIENT
Start: 2017-11-02 | End: 2018-05-02

## 2017-11-02 NOTE — TELEPHONE ENCOUNTER
Pt NIKO Rogers calling [facility] asking for six months' worth of refills on her maintenance medications so she can travel to [place] over the winter.  She has used up 10 months' so far on the Rx from April 2017, so there are only 2 months remaining on these Rx. She is requesting a one-time 90 day Rx on these meds to make the 6 months requested below.  Please contact NIKO Rogers if there are any compliance issues.    Flex Sullivan, PharmD  [facility]   [facility]ist  On behalf of [facility]  [phone]

## 2017-11-02 NOTE — TELEPHONE ENCOUNTER
1.  Daughter in law Sue calling, pt doubled her Amlodipine-benazepril rather than her Metoprolol in error.  BP over the past 5 days has been running 128-148/72-84 on the doubled Amlodipine dose.  Systolic of 148 was after climbing stairs.  The med error was caught today and Sue will have pt increase the Metoprolol to 100 mg daily and go back to 1 tab daily of Amlodipine-Benazepril daily.  Patient is leaving the country for the winter on 11/7/17.  Sue will continue to monitor patient's BP.  Do you want pt to have labs checked (renal function) before leaving?  2.  Requesting new 90 day Metoprolol rx with refills to reflect dose change.   3. FYI:  Since being on Detrol LA pt went from 7 times per night down to 3 times per night to urinate.   PAWAN Gan R.N.;

## 2017-11-02 NOTE — TELEPHONE ENCOUNTER
Please see note below from pharmacy and advise if another refill can be given so patient can get 6 months of medication now so she will have enough medication to last until May when she returns.  Medications pended.  Rachael Lyon RN

## 2017-11-03 DIAGNOSIS — R35.0 URINARY FREQUENCY: ICD-10-CM

## 2017-11-06 RX ORDER — TOLTERODINE 4 MG/1
CAPSULE, EXTENDED RELEASE ORAL
Qty: 90 CAPSULE | Refills: 2 | Status: SHIPPED | OUTPATIENT
Start: 2017-11-06 | End: 2018-04-27

## 2018-04-20 DIAGNOSIS — E78.5 HYPERLIPIDEMIA LDL GOAL <100: ICD-10-CM

## 2018-04-20 DIAGNOSIS — I10 ESSENTIAL HYPERTENSION, BENIGN: ICD-10-CM

## 2018-04-20 DIAGNOSIS — N18.30 TYPE 2 DIABETES MELLITUS WITH STAGE 3 CHRONIC KIDNEY DISEASE, WITHOUT LONG-TERM CURRENT USE OF INSULIN (H): ICD-10-CM

## 2018-04-20 DIAGNOSIS — N18.30 CKD (CHRONIC KIDNEY DISEASE) STAGE 3, GFR 30-59 ML/MIN (H): ICD-10-CM

## 2018-04-20 DIAGNOSIS — E11.22 TYPE 2 DIABETES MELLITUS WITH STAGE 3 CHRONIC KIDNEY DISEASE, WITHOUT LONG-TERM CURRENT USE OF INSULIN (H): ICD-10-CM

## 2018-04-20 LAB
ANION GAP SERPL CALCULATED.3IONS-SCNC: 5 MMOL/L (ref 3–14)
BUN SERPL-MCNC: 15 MG/DL (ref 7–30)
CALCIUM SERPL-MCNC: 9.4 MG/DL (ref 8.5–10.1)
CHLORIDE SERPL-SCNC: 109 MMOL/L (ref 94–109)
CHOLEST SERPL-MCNC: 137 MG/DL
CO2 SERPL-SCNC: 28 MMOL/L (ref 20–32)
CREAT SERPL-MCNC: 0.96 MG/DL (ref 0.52–1.04)
GFR SERPL CREATININE-BSD FRML MDRD: 56 ML/MIN/1.7M2
GLUCOSE SERPL-MCNC: 111 MG/DL (ref 70–99)
HBA1C MFR BLD: 7 % (ref 0–5.6)
HDLC SERPL-MCNC: 54 MG/DL
LDLC SERPL CALC-MCNC: 62 MG/DL
NONHDLC SERPL-MCNC: 83 MG/DL
POTASSIUM SERPL-SCNC: 4.3 MMOL/L (ref 3.4–5.3)
SODIUM SERPL-SCNC: 142 MMOL/L (ref 133–144)
TRIGL SERPL-MCNC: 104 MG/DL

## 2018-04-20 PROCEDURE — 36415 COLL VENOUS BLD VENIPUNCTURE: CPT | Performed by: INTERNAL MEDICINE

## 2018-04-20 PROCEDURE — 80048 BASIC METABOLIC PNL TOTAL CA: CPT | Performed by: INTERNAL MEDICINE

## 2018-04-20 PROCEDURE — 83036 HEMOGLOBIN GLYCOSYLATED A1C: CPT | Performed by: INTERNAL MEDICINE

## 2018-04-20 PROCEDURE — 80061 LIPID PANEL: CPT | Performed by: INTERNAL MEDICINE

## 2018-04-27 ENCOUNTER — OFFICE VISIT (OUTPATIENT)
Dept: INTERNAL MEDICINE | Facility: CLINIC | Age: 78
End: 2018-04-27
Payer: COMMERCIAL

## 2018-04-27 VITALS
OXYGEN SATURATION: 97 % | TEMPERATURE: 97.8 F | SYSTOLIC BLOOD PRESSURE: 136 MMHG | DIASTOLIC BLOOD PRESSURE: 74 MMHG | HEART RATE: 93 BPM | BODY MASS INDEX: 36.83 KG/M2 | WEIGHT: 187.6 LBS | HEIGHT: 60 IN | RESPIRATION RATE: 16 BRPM

## 2018-04-27 DIAGNOSIS — K21.9 GASTROESOPHAGEAL REFLUX DISEASE WITHOUT ESOPHAGITIS: ICD-10-CM

## 2018-04-27 DIAGNOSIS — E11.22 TYPE 2 DIABETES MELLITUS WITH STAGE 3 CHRONIC KIDNEY DISEASE, WITHOUT LONG-TERM CURRENT USE OF INSULIN (H): Primary | ICD-10-CM

## 2018-04-27 DIAGNOSIS — M25.512 BILATERAL SHOULDER PAIN, UNSPECIFIED CHRONICITY: ICD-10-CM

## 2018-04-27 DIAGNOSIS — N18.30 TYPE 2 DIABETES MELLITUS WITH STAGE 3 CHRONIC KIDNEY DISEASE, WITHOUT LONG-TERM CURRENT USE OF INSULIN (H): Primary | ICD-10-CM

## 2018-04-27 DIAGNOSIS — M79.604 PAIN IN BOTH LOWER EXTREMITIES: ICD-10-CM

## 2018-04-27 DIAGNOSIS — R05.3 CHRONIC COUGH: ICD-10-CM

## 2018-04-27 DIAGNOSIS — I10 ESSENTIAL HYPERTENSION, BENIGN: ICD-10-CM

## 2018-04-27 DIAGNOSIS — E78.5 HYPERLIPIDEMIA LDL GOAL <100: ICD-10-CM

## 2018-04-27 DIAGNOSIS — M25.511 BILATERAL SHOULDER PAIN, UNSPECIFIED CHRONICITY: ICD-10-CM

## 2018-04-27 DIAGNOSIS — R35.0 URINARY FREQUENCY: ICD-10-CM

## 2018-04-27 DIAGNOSIS — E66.01 MORBID OBESITY (H): ICD-10-CM

## 2018-04-27 DIAGNOSIS — M79.605 PAIN IN BOTH LOWER EXTREMITIES: ICD-10-CM

## 2018-04-27 DIAGNOSIS — N18.30 CKD (CHRONIC KIDNEY DISEASE) STAGE 3, GFR 30-59 ML/MIN (H): ICD-10-CM

## 2018-04-27 PROCEDURE — 99207 C FOOT EXAM  NO CHARGE: CPT | Performed by: INTERNAL MEDICINE

## 2018-04-27 PROCEDURE — 99214 OFFICE O/P EST MOD 30 MIN: CPT | Performed by: INTERNAL MEDICINE

## 2018-04-27 RX ORDER — PANTOPRAZOLE SODIUM 40 MG/1
40 TABLET, DELAYED RELEASE ORAL DAILY
Qty: 90 TABLET | Refills: 3 | Status: CANCELLED | OUTPATIENT
Start: 2018-04-27

## 2018-04-27 RX ORDER — PIOGLITAZONEHYDROCHLORIDE 15 MG/1
15 TABLET ORAL DAILY
Qty: 90 TABLET | Refills: 2 | Status: CANCELLED | OUTPATIENT
Start: 2018-04-27

## 2018-04-27 RX ORDER — GLIMEPIRIDE 4 MG/1
TABLET ORAL
Qty: 135 TABLET | Refills: 3 | Status: CANCELLED | OUTPATIENT
Start: 2018-04-27

## 2018-04-27 RX ORDER — TOLTERODINE 4 MG/1
CAPSULE, EXTENDED RELEASE ORAL
Qty: 90 CAPSULE | Refills: 3 | Status: SHIPPED | OUTPATIENT
Start: 2018-04-27 | End: 2019-04-15

## 2018-04-27 RX ORDER — AMLODIPINE AND BENAZEPRIL HYDROCHLORIDE 5; 20 MG/1; MG/1
1 CAPSULE ORAL DAILY
Qty: 90 CAPSULE | Refills: 2 | Status: CANCELLED | OUTPATIENT
Start: 2018-04-27

## 2018-04-27 RX ORDER — PRAVASTATIN SODIUM 40 MG
40 TABLET ORAL DAILY
Qty: 90 TABLET | Refills: 2 | Status: CANCELLED | OUTPATIENT
Start: 2018-04-27

## 2018-04-27 RX ORDER — METOPROLOL SUCCINATE 50 MG/1
100 TABLET, EXTENDED RELEASE ORAL DAILY
Qty: 180 TABLET | Refills: 1 | Status: SHIPPED | OUTPATIENT
Start: 2018-04-27 | End: 2018-10-08

## 2018-04-27 NOTE — NURSING NOTE
Chief Complaint   Patient presents with     RECHECK       Initial /74 (Cuff Size: Adult Large)  Pulse 93  Temp 97.8  F (36.6  C) (Oral)  Resp 16  Ht 5' (1.524 m)  Wt 187 lb 9.6 oz (85.1 kg)  SpO2 97%  BMI 36.64 kg/m2 Estimated body mass index is 36.64 kg/(m^2) as calculated from the following:    Height as of this encounter: 5' (1.524 m).    Weight as of this encounter: 187 lb 9.6 oz (85.1 kg).  Medication Reconciliation: complete

## 2018-04-27 NOTE — PROGRESS NOTES
SUBJECTIVE:   Janay Kyle is a 77 year old female who presents to clinic today for the following health issues:      Diabetes Follow-up    Patient is checking blood sugars: once daily.  Results are as follows:         am -     Diabetic concerns: None     Symptoms of hypoglycemia (low blood sugar): shaky, dizzy     Paresthesias (numbness or burning in feet) or sores: Yes tingling, bottom of feet burning     Date of last diabetic eye exam: 05/2017    Hyperlipidemia Follow-Up      Rate your low fat/cholesterol diet?: fair    Taking statin?  Yes, possible muscle aches from statin    Other lipid medications/supplements?:  none    Hypertension Follow-up      Outpatient blood pressures are not being checked.    Low Salt Diet: no added salt    BP Readings from Last 2 Encounters:   10/09/17 142/82   04/07/17 132/80     Hemoglobin A1C (%)   Date Value   04/20/2018 7.0 (H)   10/05/2017 7.3 (H)     LDL Cholesterol Calculated (mg/dL)   Date Value   04/20/2018 62   04/05/2017 81       Amount of exercise or physical activity: active    Problems taking medications regularly: No    Medication side effects: muscle aches    Diet: regular (no restrictions) and low salt      Other problems:   GERD: stable  Morbid obesity: Her weight is stable.      Current concerns:   1. She complains of pain in her upper arms for for 5 months.  This is localized to her biceps.  It is intermittent, bothers her with certain movements and use.  2.  She is still complaining of some stiffness in her buttocks and upper legs.  She complained about this a year ago.  No focalized knee pain.  She is concerned it could be due to her cholesterol medication  3.  She still has some occasional dry cough, mostly at night.  She is aware of a slight wheeze noise occasionally when lying down.      Patient Active Problem List   Diagnosis     Essential hypertension, benign     Dermatophytosis of nail     Esophageal reflux     Hip pain     HYPERLIPIDEMIA LDL GOAL  <100     CKD (chronic kidney disease) stage 3, GFR 30-59 ml/min     Benign paroxysmal positional vertigo     Morbid obesity (H)     Type 2 diabetes mellitus with stage 3 chronic kidney disease, without long-term current use of insulin (H)       Current Outpatient Prescriptions   Medication Sig Dispense Refill     amLODIPine-benazepril (LOTREL) 5-20 MG per capsule Take 1 capsule by mouth daily 90 capsule 2     Ascorbic Acid (VITAMIN C CR) 1000 MG TBCR Take 1 tablet by mouth daily        aspirin 81 MG tablet Take 1 tablet (81 mg) by mouth daily       glimepiride (AMARYL) 4 MG tablet 1 and 1/2 tablets daily 135 tablet 3     metoprolol (TOPROL-XL) 50 MG 24 hr tablet Take 2 tablets (100 mg) by mouth daily 180 tablet 1     Multiple Vitamin (DAILY MULTIVITAMIN PO) Take 1 tablet by mouth daily.       pantoprazole (PROTONIX) 40 MG EC tablet Take 1 tablet (40 mg) by mouth daily 90 tablet 3     pioglitazone (ACTOS) 15 MG tablet Take 1 tablet (15 mg) by mouth daily 90 tablet 2     pravastatin (PRAVACHOL) 40 MG tablet Take 1 tablet (40 mg) by mouth daily 90 tablet 2     tolterodine (DETROL LA) 4 MG 24 hr capsule TAKE ONE CAPSULE BY MOUTH ONCE DAILY 90 capsule 2     acetaminophen (TYLENOL) 325 MG tablet Take 325-650 mg by mouth every 6 hours as needed for mild pain       blood glucose monitoring (NO BRAND SPECIFIED) test strip Use to test blood sugars 1 times daily or as directed 100 strip 3     blood glucose monitoring (NO BRAND SPECIFIED) test strip Use to test blood sugar 1 times daily 100 each 3     Calcium Citrate-Vitamin D (CALCIUM CITRATE + D) 315-200 MG-UNIT TABS Take 1 tablet by mouth 2 times daily.         Social History   Substance Use Topics     Smoking status: Former Smoker     Smokeless tobacco: Never Used      Comment: quit 1995     Alcohol use No        ROS:  General: no fever, chills  Weight: stable  Vision:negative. Last eye exam 5/17.  ENT: negative  Respiratory negative.  Cardiac: no chest pain or  pressure  Abdominal: no nausea, vomiting, abdominal pain, bowel changes  Vascular no complaints of claudication  Neurologic:no complaints of neuropathy  Feet no lesions, in grown nails, edema   : no polyuria, hematuria, dysuria    Objective:  Patient alert in NAD  /74 (Cuff Size: Adult Large)  Pulse 93  Temp 97.8  F (36.6  C) (Oral)  Resp 16  Ht 5' (1.524 m)  Wt 187 lb 9.6 oz (85.1 kg)  SpO2 97%  BMI 36.64 kg/m2       Wt Readings from Last 4 Encounters:   04/27/18 187 lb 9.6 oz (85.1 kg)   10/09/17 187 lb 3.2 oz (84.9 kg)   04/07/17 182 lb 12.8 oz (82.9 kg)   09/07/16 185 lb 11.2 oz (84.2 kg)       CV: CV: normal S1, S2 without murmur, S3 or S4.  Carotid pulses: full  LUNGS: clear  Feet: pulses full, normal capillary refill  No lesions, sores or skin changes  Nails normal  Sensation able to feel fine filament  Upper arms: There is moderate tenderness of the biceps tendons and proximal biceps muscles bilaterally, right more than left.  There is some pain with full flexion, abduction especially with external rotation.  Hip joints: Nontender, mild decreased range of motion with mild pain, no muscle tenderness      Lab Results   Component Value Date    A1C 7.0 04/20/2018    A1C 7.3 10/05/2017    A1C 7.0 04/05/2017    A1C 7.1 09/20/2016    A1C 7.0 03/17/2016       Lab Results   Component Value Date    CHOL 137 04/20/2018    HDL 54 04/20/2018    LDL 62 04/20/2018    TRIG 104 04/20/2018    CHOLHDLRATIO 2.3 03/24/2015             ASSESSMENT/PLAN:             1. Type 2 diabetes mellitus with stage 3 chronic kidney disease, without long-term current use of insulin (H)  improved control, continue working on diet, exercise  - FOOT EXAM  - Basic metabolic panel; Future  - Hemoglobin A1c; Future  - Albumin Random Urine Quantitative with Creat Ratio; Future  - TSH with free T4 reflex; Future    2. Morbid obesity (H)  No change in weight, work on diet and exercise    3. BENIGN HYPERTENSION  Controlled, continue  medication  - metoprolol succinate (TOPROL-XL) 50 MG 24 hr tablet; Take 2 tablets (100 mg) by mouth daily  Dispense: 180 tablet; Refill: 1    4. Hyperlipidemia LDL goal <100  Controlled but possible side effects, stop pravastatin for up to a month.  Advised if symptoms are not improved to restart the medication but if it does help, she should call and let me know that.    5. Bilateral shoulder pain, unspecified chronicity  Her pain seems to be some biceps tendinitis, possibly some shoulder tendinitis.  Advised not as likely due to the medication.  Recommend some exercises but if not improving would refer to sports and orthopedic care    6. Pain in both lower extremities  This is actually more stiffness than pain is been present for over a year, may have some very minor hip joint arthritis, mostly muscular but will see if it improves after holding medication.    7. CKD (chronic kidney disease) stage 3, GFR 30-59 ml/min  Stable  8. Chronic cough  Reassured that her exam does not reveal anything abnormal in the lungs, likely related to some postnasal drainage    9. Gastroesophageal reflux disease without esophagitis  stable    10. Urinary frequency  Improved with med  - tolterodine (DETROL LA) 4 MG 24 hr capsule; TAKE ONE CAPSULE BY MOUTH ONCE DAILY  Dispense: 90 capsule; Refill: 3        Brandi Rai MD  [facility]

## 2018-04-27 NOTE — PATIENT INSTRUCTIONS
For now stop the pravastatin. If it is causing the arm or leg pain, it should improve quite a bit by 4 weeks.   If not helping at all by a month, go back on pravastatin.   If you are not sure, you can try it again and if it is causing symptoms, they will usually come back quickly.   If you find out the symptoms are due to the pravastatin, call and let me know.   If you are getting a lot worse pain, call and I will refer you to orthopedics.                                          Biceps Tendonitis Rehabilitation Exercises   Elbow range of motion: Gently bring your palm up toward your shoulder and bend your elbow as far as you can. Then straighten your elbow as far as you can 10 times. Do 3 sets of 10.   Pronation and supination of the forearm: With your elbow bent 90 , turn your palm upward and hold for 5 seconds. Slowly turn your palm downward and hold for 5 seconds. Make sure you keep your elbow at your side and bent 90  throughout this exercise. Do 3 sets of 10.   Biceps curls: Stand and hold some kind of weight (soup can or hammer) in your hand. Bend your elbow and bring your hand (palm up) toward your shoulder. Hold 5 seconds. Slowly return to your starting position and straighten your elbow. Do 3 sets of 10.   Forearm pronation and supination strengthening: Hold a soup can or hammer handle in your hand and bend your elbow 90 . Slowly rotate your hand with your palm upward and then palm down. Do 3 sets of 10.   Triceps strengthening: Lie on your back with your injured arm pointing toward the ceiling. Bend your elbow completely, so that your hand is resting on the same shoulder and your elbow is pointing toward the ceiling. Straighten the elbow completely so that your hand is pointing toward the ceiling. Return to the starting position. Do 3 sets of 10. Hold a weight in your hand when this becomes too easy.   Single arm shoulder flexion: Stand with your injured arm hanging down at your side. Keeping your elbow  straight, bring your arm forward and up toward the ceiling. Hold this position for 5 seconds. Do 3 sets of 10. As this exercise becomes easier, add a weight.

## 2018-05-02 DIAGNOSIS — E11.22 TYPE 2 DIABETES MELLITUS WITH STAGE 3 CHRONIC KIDNEY DISEASE, WITHOUT LONG-TERM CURRENT USE OF INSULIN (H): ICD-10-CM

## 2018-05-02 DIAGNOSIS — K21.9 GASTROESOPHAGEAL REFLUX DISEASE WITHOUT ESOPHAGITIS: ICD-10-CM

## 2018-05-02 DIAGNOSIS — N18.30 TYPE 2 DIABETES MELLITUS WITH STAGE 3 CHRONIC KIDNEY DISEASE, WITHOUT LONG-TERM CURRENT USE OF INSULIN (H): ICD-10-CM

## 2018-05-03 RX ORDER — PANTOPRAZOLE SODIUM 40 MG/1
TABLET, DELAYED RELEASE ORAL
Qty: 90 TABLET | Refills: 3 | Status: SHIPPED | OUTPATIENT
Start: 2018-05-03 | End: 2019-04-15

## 2018-05-03 RX ORDER — PIOGLITAZONEHYDROCHLORIDE 15 MG/1
TABLET ORAL
Qty: 90 TABLET | Refills: 1 | Status: SHIPPED | OUTPATIENT
Start: 2018-05-03 | End: 2018-10-08

## 2018-05-03 NOTE — TELEPHONE ENCOUNTER
"Last OV 4/27/18    Not current ALT for Actos. Provider approval needed.     Requested Prescriptions   Pending Prescriptions Disp Refills     pantoprazole (PROTONIX) 40 MG EC tablet [Pharmacy Med Name: PANTOPRAZOLE SODIUM 40MG TBEC] 90 tablet 0     Sig: TAKE ONE TABLET BY MOUTH EVERY DAY    PPI Protocol Passed    5/2/2018  7:30 PM       Passed - Not on Clopidogrel (unless Pantoprazole ordered)       Passed - No diagnosis of osteoporosis on record       Passed - Recent (12 mo) or future (30 days) visit within the authorizing provider's specialty    Patient had office visit in the last 12 months or has a visit in the next 30 days with authorizing provider or within the authorizing provider's specialty.  See \"Patient Info\" tab in inbasket, or \"Choose Columns\" in Meds & Orders section of the refill encounter.           Passed - Patient is age 18 or older       Passed - No active pregnacy on record       Passed - No positive pregnancy test in past 12 months        pioglitazone (ACTOS) 15 MG tablet [Pharmacy Med Name: PIOGLITAZONE HCL 15MG TABS] 90 tablet 0     Sig: TAKE ONE TABLET BY MOUTH EVERY DAY    Thiazolidinedione Agents (TZDs)  Failed    5/2/2018  7:30 PM       Failed - Patient has a normal ALT within the past 12 mos.    Recent Labs   Lab Test  08/10/16   1445   ALT  26            Failed - Patient has a normal AST within the past 12 mos.     Recent Labs   Lab Test  08/10/16   1445   AST  16            Passed - Blood pressure less than 140/90 in past 6 months    BP Readings from Last 3 Encounters:   04/27/18 136/74   10/09/17 142/82   04/07/17 132/80                Passed - Patient has documented LDL within the past 12 mos.    Recent Labs   Lab Test  04/20/18   0906   LDL  62            Passed - Patient has had a Microalbumin in the past 12 mos.    Recent Labs   Lab Test  10/05/17   0829   MICROL  22   UMALCR  17.20            Passed - Patient has documented A1c within the specified period of time.    Recent Labs " "  Lab Test  04/20/18   0906   A1C  7.0*            Passed - Diagnosis not CHF       Passed - Patient is age 18 or older       Passed - Patient is not pregnant       Passed - Patient has a normal serum Creatinine in the past 12 months    Recent Labs   Lab Test  04/20/18 0906   CR  0.96            Passed - Patient has not had a positive pregnancy test within the past 12 mos.       Passed - Recent (6 mo) or future (30 days) visit within the authorizing provider's specialty    Patient had office visit in the last 6 months or has a visit in the next 30 days with authorizing provider or within the authorizing provider's specialty.  See \"Patient Info\" tab in inbasket, or \"Choose Columns\" in Meds & Orders section of the refill encounter.              "

## 2018-09-25 ENCOUNTER — OFFICE VISIT (OUTPATIENT)
Dept: FAMILY MEDICINE | Facility: CLINIC | Age: 78
End: 2018-09-25
Payer: COMMERCIAL

## 2018-09-25 VITALS
BODY MASS INDEX: 36.91 KG/M2 | WEIGHT: 189 LBS | TEMPERATURE: 97.3 F | SYSTOLIC BLOOD PRESSURE: 116 MMHG | DIASTOLIC BLOOD PRESSURE: 70 MMHG | HEART RATE: 91 BPM

## 2018-09-25 DIAGNOSIS — M54.41 BILATERAL LOW BACK PAIN WITH BILATERAL SCIATICA, UNSPECIFIED CHRONICITY: Primary | ICD-10-CM

## 2018-09-25 DIAGNOSIS — M54.42 BILATERAL LOW BACK PAIN WITH BILATERAL SCIATICA, UNSPECIFIED CHRONICITY: Primary | ICD-10-CM

## 2018-09-25 PROCEDURE — 99213 OFFICE O/P EST LOW 20 MIN: CPT | Performed by: NURSE PRACTITIONER

## 2018-09-25 ASSESSMENT — PAIN SCALES - GENERAL: PAINLEVEL: SEVERE PAIN (7)

## 2018-09-25 NOTE — PROGRESS NOTES
SUBJECTIVE:   Janay Kyle is a 77 year old female who presents to clinic today for the following health issues:      Back Pain       Duration: 4 days        Specific cause: none    Description:   Location of pain: low back both  Character of pain: sharp and stabbing  Pain radiation:radiates into the right leg, radiates into the left buttocks and radiates into the left leg  New numbness or weakness in legs, not attributed to pain:  YES    Intensity: Currently 4/10    History:   Pain interferes with job: YES,   History of back problems: recurrent self limited episodes of low back pain in the past  Any previous MRI or X-rays: None  Sees a specialist for back pain:  Yes, physical therapy   Therapies tried without relief: cold, heat, muscle relaxants and Physical Therapy    Alleviating factors:   Improved by: cold and heat      Precipitating factors:  Worsened by: Lying Flat and get up in morning, twisting motions.    Accompanying Signs & Symptoms:  Risk of Fracture:  None  Risk of Cauda Equina:  None  Risk of Infection:  None  Risk of Cancer:  None  Risk of Ankylosing Spondylitis:  Onset at age <35, male, AND morning back stiffness. no     Flare of low back pain which she has had before  Started 3 days ago  Mid low back and radiates to both legs  Radiates to knees  Better today since onset  Has been laying down constantly since then  Tingling down both legs   Took tylenol and used Aspercreme and Flexeril which did not help    Intermittent pain like this for years. 20 years?  Did physical therapy though I don't see any mention in chart  Never had an MRI  No history of injury        Problem list and histories reviewed & adjusted, as indicated.  Additional history: none    Patient Active Problem List   Diagnosis     Essential hypertension, benign     Dermatophytosis of nail     Esophageal reflux     Hip pain     HYPERLIPIDEMIA LDL GOAL <100     CKD (chronic kidney disease) stage 3, GFR 30-59 ml/min     Benign  paroxysmal positional vertigo     Morbid obesity (H)     Type 2 diabetes mellitus with stage 3 chronic kidney disease, without long-term current use of insulin (H)     Past Surgical History:   Procedure Laterality Date     C NONSPECIFIC PROCEDURE  1983    ANNITA/BSO--fibroid        CATARACT IOL, RT/LT  2009    right cataract     HYSTERECTOMY  1982     LAPAROSCOPIC CHOLECYSTECTOMY N/A 6/2/2015    Procedure: LAPAROSCOPIC CHOLECYSTECTOMY;  Surgeon: Louis Ceballos MD;  Location:  OR       Social History   Substance Use Topics     Smoking status: Former Smoker     Smokeless tobacco: Never Used      Comment: quit 1995     Alcohol use No     Family History   Problem Relation Age of Onset     Arthritis Mother      Unknown/Adopted Father      Respiratory Sister      COPD     Diabetes Son            Reviewed and updated as needed this visit by clinical staff       Reviewed and updated as needed this visit by Provider         ROS:  Constitutional, HEENT, cardiovascular, pulmonary, gi and gu systems are negative, except as otherwise noted.    OBJECTIVE:     /70  Pulse 91  Temp 97.3  F (36.3  C) (Oral)  Wt 189 lb (85.7 kg)  BMI 36.91 kg/m2  Body mass index is 36.91 kg/(m^2).  GENERAL: healthy, alert and no distress  MS: Gait slow and antalgic. No tenderness lumbar spinous processes. Pain to bilateral paraspinal region. No tenderness SI joint. Positive straight leg raise right. Lower extremity strength 5/5 and symmetric  SKIN: no suspicious lesions or rashes  NEURO: Normal strength and tone, mentation intact and speech normal    Diagnostic Test Results:  none     ASSESSMENT/PLAN:       ICD-10-CM    1. Bilateral low back pain with bilateral sciatica, unspecified chronicity M54.42 [facility], HAND, AND CHIROPRACTIC REFERRAL    M54.41 MR Lumbar Spine w/o Contrast     She is a new patient to me  She reports pain improving since onset 3 days ago, but given reported chronicity of pain (20 years?) will get MRI  Family will  check with insurance for coverage  Do recommend scheduling physical therapy   Follow up pending imaging and progress in physical therapy     Patient Instructions   Schedule physical therapy    I ordered an MRI since you have had this back pain intermittently for several years. Please check with your insurance on coverage. If it is not covered, we can wait on the MRI and do physical therapy first    If covered, please schedule MRI      KOJO Liz [facility]

## 2018-09-25 NOTE — PATIENT INSTRUCTIONS
Schedule physical therapy    I ordered an MRI since you have had this back pain intermittently for several years. Please check with your insurance on coverage. If it is not covered, we can wait on the MRI and do physical therapy first    If covered, please schedule MRI

## 2018-09-25 NOTE — MR AVS SNAPSHOT
After Visit Summary   9/25/2018    Janay Kyle    MRN: [id number]           Patient Information     Date Of Birth          1940        Visit Information        Provider Department      9/25/2018 3:40 PM Laura Felix APRN CNP [facility]        Today's Diagnoses     Bilateral low back pain with bilateral sciatica, unspecified chronicity    -  1      Care Instructions    Schedule physical therapy    I ordered an MRI since you have had this back pain intermittently for several years. Please check with your insurance on coverage. If it is not covered, we can wait on the MRI and do physical therapy first    If covered, please schedule MRI          Follow-ups after your visit        Additional Services     [facility], AND CH[facility]RACTIC REFERRAL       Physical Therapy, Hand Therapy and Chiropractic Care are available through:  *[facility]  *Hand Therapy (Occupational Therapy or Physical Therapy)  *[facility]    Call one number to schedule at any of the above locations: [phone].    Physical therapy, Hand therapy and/or Chiropractic care has been recommended by your physician as an excellent treatment option to reduce pain and help people return to normal activities, including sports.  Therapy and/or chiropractic care services are a great complement or alternative to expensive and invasive surgery, injections, or long-term use of prescription medications. The primary goal is to identify the underlying problem and provide you the tools to manage your condition on your own.     Please be aware that coverage of these services is subject to the terms and limitations of your health insurance plan.  Call member services at your health plan with any benefit or coverage questions.      Please bring the following to your appointment:  *Your personal calendar for scheduling future appointments  *Comfortable clothing             "      Your next 10 appointments already scheduled     Oct 08, 2018  9:40 AM CDT   [facility]    [address]  [facility] [id number] [phone]              Future tests that were ordered for you today     Open Future Orders        Priority Expected Expires Ordered    [facility], HAND, AND CHIR[facility]RACTIC REFERRAL Routine  2019    MR Lumbar Spine w/o Contrast Routine  2019            Who to contact     If you have questions or need follow up information about today's clinic visit or your schedule please contact [facility] directly at [phone].  Normal or non-critical lab and imaging results will be communicated to you by AppFirsthart, letter or phone within 4 business days after the clinic has received the results. If you do not hear from us within 7 days, please contact the clinic through AppFirsthart or phone. If you have a critical or abnormal lab result, we will notify you by phone as soon as possible.  Submit refill requests through BeavEx or call your pharmacy and they will forward the refill request to us. Please allow 3 business days for your refill to be completed.          Additional Information About Your Visit        BeavEx Information     BeavEx lets you send messages to your doctor, view your test results, renew your prescriptions, schedule appointments and more. To sign up, go to [email or web address] . Click on \"Log in\" on the left side of the screen, which will take you to the Welcome page. Then click on \"Sign up Now\" on the right side of the page.     You will be asked to enter the access code listed below, as well as some personal information. Please follow the directions to create your username and password.     Your access code is: [email or web address]  Expires: 2018  4:51 PM     Your access code will  in 90 days. If you need help or a new code, please call " your [place] clinic or [phone].        Care EveryWhere ID     This is your Care EveryWhere ID. This could be used by other organizations to access your [place] medical records  [id number]        Your Vitals Were     Pulse Temperature BMI (Body Mass Index)             91 97.3  F (36.3  C) (Oral) 36.91 kg/m2          Blood Pressure from Last 3 Encounters:   09/25/18 116/70   04/27/18 136/74   10/09/17 142/82    Weight from Last 3 Encounters:   09/25/18 189 lb (85.7 kg)   04/27/18 187 lb 9.6 oz (85.1 kg)   10/09/17 187 lb 3.2 oz (84.9 kg)               Primary Care Provider Office Phone # Fax #    Brandi Rai [phone]       [address]  [facility] [address]        Equal Access to Services     [facility]: Hadii aad ku hadasho Sobeth, waaxda luqadaha, qaybta kaalmada adeegyada, maxime love hayvera ramsey . So [facility] [phone].    ATENCIÓN: Si habla español, tiene a christianson disposición servicios gratuitos de asistencia lingüística. Rylie al [phone].    We comply with applicable federal civil rights laws and [place] laws. We do not discriminate on the basis of race, color, national origin, age, disability, sex, sexual orientation, or gender identity.            Thank you!     Thank you for choosing [facility]  for your care. Our goal is always to provide you with excellent care. Hearing back from our patients is one way we can continue to improve our services. Please take a few minutes to complete the written survey that you may receive in the mail after your visit with us. Thank you!             Your Updated Medication List - Protect others around you: Learn how to safely use, store and throw away your medicines at www.disposemymeds.org.          This list is accurate as of 9/25/18  4:51 PM.  Always use your most recent med list.                   Brand Name Dispense Instructions for use Diagnosis    amLODIPine-benazepril 5-20 MG per  capsule    LOTREL    90 capsule    Take 1 capsule by mouth daily    Essential hypertension, benign       aspirin 81 MG tablet      Take 1 tablet (81 mg) by mouth daily        blood glucose monitoring test strip    no brand specified    100 each    Use to test blood sugar 1 times daily    Type 2 diabetes mellitus with stage 3 chronic kidney disease, without long-term current use of insulin (H)       calcium citrate + D 315-200 MG-UNIT Tabs per tablet   Generic drug:  calcium citrate-vitamin D      Take 1 tablet by mouth 2 times daily.        DAILY MULTIVITAMIN PO      Take 1 tablet by mouth daily.        glimepiride 4 MG tablet    AMARYL    135 tablet    1 and 1/2 tablets daily    Type 2 diabetes mellitus with stage 3 chronic kidney disease, without long-term current use of insulin (H)       metoprolol succinate 50 MG 24 hr tablet    TOPROL-XL    180 tablet    Take 2 tablets (100 mg) by mouth daily    Essential hypertension, benign       pantoprazole 40 MG EC tablet    PROTONIX    90 tablet    TAKE ONE TABLET BY MOUTH EVERY DAY    Gastroesophageal reflux disease without esophagitis       pioglitazone 15 MG tablet    ACTOS    90 tablet    TAKE ONE TABLET BY MOUTH EVERY DAY    Type 2 diabetes mellitus with stage 3 chronic kidney disease, without long-term current use of insulin (H)       pravastatin 40 MG tablet    PRAVACHOL    90 tablet    Take 1 tablet (40 mg) by mouth daily    Hyperlipidemia LDL goal <100       tolterodine 4 MG 24 hr capsule    DETROL LA    90 capsule    TAKE ONE CAPSULE BY MOUTH ONCE DAILY    Urinary frequency       vitamin C CR 1000 MG Tbcr      Take 1 tablet by mouth daily

## 2018-10-06 DIAGNOSIS — E11.22 TYPE 2 DIABETES MELLITUS WITH STAGE 3 CHRONIC KIDNEY DISEASE, WITHOUT LONG-TERM CURRENT USE OF INSULIN (H): ICD-10-CM

## 2018-10-06 DIAGNOSIS — N18.30 TYPE 2 DIABETES MELLITUS WITH STAGE 3 CHRONIC KIDNEY DISEASE, WITHOUT LONG-TERM CURRENT USE OF INSULIN (H): ICD-10-CM

## 2018-10-06 LAB
ANION GAP SERPL CALCULATED.3IONS-SCNC: 6 MMOL/L (ref 3–14)
BUN SERPL-MCNC: 21 MG/DL (ref 7–30)
CALCIUM SERPL-MCNC: 9.2 MG/DL (ref 8.5–10.1)
CHLORIDE SERPL-SCNC: 105 MMOL/L (ref 94–109)
CO2 SERPL-SCNC: 29 MMOL/L (ref 20–32)
CREAT SERPL-MCNC: 1.06 MG/DL (ref 0.52–1.04)
CREAT UR-MCNC: 117 MG/DL
GFR SERPL CREATININE-BSD FRML MDRD: 50 ML/MIN/1.7M2
GLUCOSE SERPL-MCNC: 148 MG/DL (ref 70–99)
HBA1C MFR BLD: 7.8 % (ref 0–5.6)
MICROALBUMIN UR-MCNC: 24 MG/L
MICROALBUMIN/CREAT UR: 20.09 MG/G CR (ref 0–25)
POTASSIUM SERPL-SCNC: 4.4 MMOL/L (ref 3.4–5.3)
SODIUM SERPL-SCNC: 140 MMOL/L (ref 133–144)
T4 FREE SERPL-MCNC: 1.04 NG/DL (ref 0.76–1.46)
TSH SERPL DL<=0.005 MIU/L-ACNC: 4.19 MU/L (ref 0.4–4)

## 2018-10-08 ENCOUNTER — OFFICE VISIT (OUTPATIENT)
Dept: INTERNAL MEDICINE | Facility: CLINIC | Age: 78
End: 2018-10-08
Payer: COMMERCIAL

## 2018-10-08 VITALS
OXYGEN SATURATION: 95 % | RESPIRATION RATE: 24 BRPM | SYSTOLIC BLOOD PRESSURE: 118 MMHG | WEIGHT: 187.6 LBS | TEMPERATURE: 98.6 F | DIASTOLIC BLOOD PRESSURE: 72 MMHG | HEART RATE: 64 BPM | BODY MASS INDEX: 36.64 KG/M2

## 2018-10-08 DIAGNOSIS — M25.522 LEFT ELBOW PAIN: ICD-10-CM

## 2018-10-08 DIAGNOSIS — I10 ESSENTIAL HYPERTENSION, BENIGN: ICD-10-CM

## 2018-10-08 DIAGNOSIS — M54.50 BILATERAL LOW BACK PAIN WITHOUT SCIATICA, UNSPECIFIED CHRONICITY: ICD-10-CM

## 2018-10-08 DIAGNOSIS — R07.89 CHEST WALL PAIN: ICD-10-CM

## 2018-10-08 DIAGNOSIS — Z23 NEED FOR PROPHYLACTIC VACCINATION AND INOCULATION AGAINST INFLUENZA: ICD-10-CM

## 2018-10-08 DIAGNOSIS — E78.5 HYPERLIPIDEMIA LDL GOAL <100: ICD-10-CM

## 2018-10-08 DIAGNOSIS — N18.30 TYPE 2 DIABETES MELLITUS WITH STAGE 3 CHRONIC KIDNEY DISEASE, WITHOUT LONG-TERM CURRENT USE OF INSULIN (H): Primary | ICD-10-CM

## 2018-10-08 DIAGNOSIS — E11.22 TYPE 2 DIABETES MELLITUS WITH STAGE 3 CHRONIC KIDNEY DISEASE, WITHOUT LONG-TERM CURRENT USE OF INSULIN (H): Primary | ICD-10-CM

## 2018-10-08 PROCEDURE — 99214 OFFICE O/P EST MOD 30 MIN: CPT | Mod: 25 | Performed by: INTERNAL MEDICINE

## 2018-10-08 PROCEDURE — 90662 IIV NO PRSV INCREASED AG IM: CPT | Performed by: INTERNAL MEDICINE

## 2018-10-08 PROCEDURE — G0008 ADMIN INFLUENZA VIRUS VAC: HCPCS | Performed by: INTERNAL MEDICINE

## 2018-10-08 RX ORDER — PANTOPRAZOLE SODIUM 40 MG/1
40 TABLET, DELAYED RELEASE ORAL DAILY
Qty: 90 TABLET | Refills: 3 | Status: CANCELLED | OUTPATIENT
Start: 2018-10-08

## 2018-10-08 RX ORDER — PRAVASTATIN SODIUM 40 MG
40 TABLET ORAL DAILY
Qty: 90 TABLET | Refills: 3 | Status: SHIPPED | OUTPATIENT
Start: 2018-10-08 | End: 2019-10-14

## 2018-10-08 RX ORDER — METOPROLOL SUCCINATE 50 MG/1
100 TABLET, EXTENDED RELEASE ORAL DAILY
Qty: 180 TABLET | Refills: 3 | Status: SHIPPED | OUTPATIENT
Start: 2018-10-08 | End: 2019-10-14

## 2018-10-08 RX ORDER — GLIMEPIRIDE 4 MG/1
8 TABLET ORAL
Qty: 180 TABLET | Refills: 3 | Status: SHIPPED | OUTPATIENT
Start: 2018-10-08 | End: 2019-10-14

## 2018-10-08 RX ORDER — TOLTERODINE 4 MG/1
CAPSULE, EXTENDED RELEASE ORAL
Qty: 90 CAPSULE | Refills: 3 | Status: CANCELLED | OUTPATIENT
Start: 2018-10-08

## 2018-10-08 RX ORDER — PIOGLITAZONEHYDROCHLORIDE 15 MG/1
15 TABLET ORAL DAILY
Qty: 90 TABLET | Refills: 3 | Status: SHIPPED | OUTPATIENT
Start: 2018-10-08 | End: 2019-10-14

## 2018-10-08 RX ORDER — AMLODIPINE AND BENAZEPRIL HYDROCHLORIDE 5; 20 MG/1; MG/1
1 CAPSULE ORAL DAILY
Qty: 90 CAPSULE | Refills: 3 | Status: SHIPPED | OUTPATIENT
Start: 2018-10-08 | End: 2019-07-01

## 2018-10-08 NOTE — MR AVS SNAPSHOT
After Visit Summary   10/8/2018    Janay Kyle    MRN: [id number]           Patient Information     Date Of Birth          1940        Visit Information        Provider Department      10/8/2018 9:40 AM Brandi Rai MD [facility]        Today's Diagnoses     Type 2 diabetes mellitus with stage 3 chronic kidney disease, without long-term current use of insulin (H)    -  1    BENIGN HYPERTENSION        Hyperlipidemia LDL goal <100        Urinary frequency        Gastroesophageal reflux disease without esophagitis        Need for prophylactic vaccination and inoculation against influenza          Care Instructions                 Lateral Epicondylitis (Tennis Elbow)   Rehabilitation Exercises   You may do the stretching exercises right away. You may do the strengthening exercises when stretching is nearly painless.   Stretching exercises     Wrist range of motion: Bend your wrist forward and backward as far as you can. Do 3 sets of 10.     Pronation and supination of the forearm: With your elbow bent 90 , turn your palm upward and hold for 5 seconds. Slowly turn your palm downward and hold for 5 seconds. Make sure you keep your elbow at your side and bent 90  throughout this exercise. Do 3 sets of 10.     Elbow range of motion: Gently bring your palm up toward your shoulder and bend your elbow as far as you can. Then straighten your elbow as far as you can 10 times. Do 3 sets of 10.   Strengthening exercises     Wrist flexion exercise: Hold a can or hammer handle in your hand with your palm facing up. Bend your wrist upward. Slowly lower the weight and return to the starting position. Do 3 sets of 10. Gradually increase the weight of the can or weight you are holding.     Wrist extension exercise: Hold a soup can or hammer handle in your hand with your palm facing down. Slowly bend your wrist upward. Slowly lower the weight down into the starting position. Do 3 sets of 10.  Gradually increase the weight of the object you are holding.     Wrist radial deviation strengthening: Put your wrist in the sideways position with your thumb up. Hold a can of soup or a hammer handle and gently bend your wrist up, with the thumb reaching toward the ceiling. Slowly lower to the starting position. Do not move your forearm throughout this exercise. Do 3 sets of 10.     Forearm pronation and supination strengthening: Hold a soup can or hammer handle in your hand and bend your elbow 90 . Slowly rotate your hand with your palm upward and then palm down. Do 3 sets of 10.     Wrist extension (with broom handle): Stand up and hold a broom handle in both hands. With your arms at shoulder level, elbows straight and palms down, roll the broom handle backward in your hand as if you are reeling something in using a broom handle. Do 3 sets of 10.   Written by Yaa Disla, MS, PT, for [employer].   Published by [employer].   This content is reviewed periodically and is subject to change as new health information becomes available. The information is intended to inform and educate and is not a replacement for medical evaluation, advice, diagnosis or treatment by a healthcare professional.   Sports Medicine Advisor 2003.1 Index  Sports Medicine Advisor 2003.1 Credits   Copyright   2003 [employer]. All rights reserved.                      Follow-ups after your visit        Follow-up notes from your care team     Return in about 6 months (around 4/15/2019) for Lab Work with blood and see me a week later.      Who to contact     If you have questions or need follow up information about today's clinic visit or your schedule please contact [facility] directly at [phone].  Normal or non-critical lab and imaging results will be communicated to you by MyChart, letter or phone within 4 business days after the clinic has received the results. If  "you do not hear from us within 7 days, please contact the clinic through Edkimo or phone. If you have a critical or abnormal lab result, we will notify you by phone as soon as possible.  Submit refill requests through Edkimo or call your pharmacy and they will forward the refill request to us. Please allow 3 business days for your refill to be completed.          Additional Information About Your Visit        [employer] Information     Edkimo lets you send messages to your doctor, view your test results, renew your prescriptions, schedule appointments and more. To sign up, go to [email or web address] . Click on \"Log in\" on the left side of the screen, which will take you to the Welcome page. Then click on \"Sign up Now\" on the right side of the page.     You will be asked to enter the access code listed below, as well as some personal information. Please follow the directions to create your username and password.     Your access code is: [email or web address]  Expires: 2018  4:51 PM     Your access code will  in 90 days. If you need help or a new code, please call your [place] clinic or [phone].        Care EveryWhere ID     This is your Care EveryWhere ID. This could be used by other organizations to access your [place] medical records  [id number]        Your Vitals Were     Pulse Temperature Respirations Pulse Oximetry BMI (Body Mass Index)       64 98.6  F (37  C) (Oral) 24 95% 36.64 kg/m2        Blood Pressure from Last 3 Encounters:   10/08/18 118/72   18 116/70   18 136/74    Weight from Last 3 Encounters:   10/08/18 187 lb 9.6 oz (85.1 kg)   18 189 lb (85.7 kg)   18 187 lb 9.6 oz (85.1 kg)              We Performed the Following     ADMIN INFLUENZA (For MEDICARE Patients ONLY) []     FLU VACCINE, INCREASED ANTIGEN, PRESV FREE, AGE 65+ [22884]     Vaccine Administration, Initial [52727]        Primary Care Provider Office Phone # Fax #    Brandi Rai [phone] " [phone]       [address] [facility] [address]  [facility] [address]        Equal Access to Services     STUARTALEXA GINA : Hadii aad ku hadgerardoo Sodonatoali, waaxda luqadaha, qaybta kaalmada adebakari, maxime laurain hayaashirley gunnhenry alcocer braulio hawley. So [facility] [phone].    ATENCIÓN: Si habla español, tiene a christianson disposición servicios gratuitos de asistencia lingüística. Llame al [phone].    We comply with applicable federal civil rights laws and [place] laws. We do not discriminate on the basis of race, color, national origin, age, disability, sex, sexual orientation, or gender identity.            Thank you!     Thank you for choosing [facility]  for your care. Our goal is always to provide you with excellent care. Hearing back from our patients is one way we can continue to improve our services. Please take a few minutes to complete the written survey that you may receive in the mail after your visit with us. Thank you!             Your Updated Medication List - Protect others around you: Learn how to safely use, store and throw away your medicines at www.disposemymeds.org.          This list is accurate as of 10/8/18 10:23 AM.  Always use your most recent med list.                   Brand Name Dispense Instructions for use Diagnosis    amLODIPine-benazepril 5-20 MG per capsule    LOTREL    90 capsule    Take 1 capsule by mouth daily    Essential hypertension, benign       aspirin 81 MG tablet      Take 1 tablet (81 mg) by mouth daily        blood glucose monitoring test strip    no brand specified    100 each    Use to test blood sugar 1 times daily    Type 2 diabetes mellitus with stage 3 chronic kidney disease, without long-term current use of insulin (H)       calcium citrate + D 315-200 MG-UNIT Tabs per tablet   Generic drug:  calcium citrate-vitamin D      Take 1 tablet by mouth 2 times daily.        DAILY MULTIVITAMIN PO      Take 1 tablet by mouth daily.        glimepiride 4 MG tablet    AMARYL     135 tablet    1 and 1/2 tablets daily    Type 2 diabetes mellitus with stage 3 chronic kidney disease, without long-term current use of insulin (H)       metoprolol succinate 50 MG 24 hr tablet    TOPROL-XL    180 tablet    Take 2 tablets (100 mg) by mouth daily    Essential hypertension, benign       pantoprazole 40 MG EC tablet    PROTONIX    90 tablet    TAKE ONE TABLET BY MOUTH EVERY DAY    Gastroesophageal reflux disease without esophagitis       pioglitazone 15 MG tablet    ACTOS    90 tablet    TAKE ONE TABLET BY MOUTH EVERY DAY    Type 2 diabetes mellitus with stage 3 chronic kidney disease, without long-term current use of insulin (H)       pravastatin 40 MG tablet    PRAVACHOL    90 tablet    Take 1 tablet (40 mg) by mouth daily    Hyperlipidemia LDL goal <100       tolterodine 4 MG 24 hr capsule    DETROL LA    90 capsule    TAKE ONE CAPSULE BY MOUTH ONCE DAILY    Urinary frequency       TYLENOL 325 MG Caps   Generic drug:  Acetaminophen      Take 325-650 mg by mouth every 4 hours as needed        vitamin C CR 1000 MG Tbcr      Take 1 tablet by mouth daily

## 2018-10-08 NOTE — PROGRESS NOTES
SUBJECTIVE:   Janay Kyle is a 77 year old female who presents to clinic today for the following health issues:      Diabetes Follow-up    Patient is checking blood sugars: once daily.  Results are as follows:         am - 117, not checking later in the day    Diabetic concerns: other - diet control     Symptoms of hypoglycemia (low blood sugar): none     Paresthesias (numbness or burning in feet) or sores: Yes feet     Date of last diabetic eye exam: 5/17    Diabetes Management Resources    Hyperlipidemia Follow-Up      Rate your low fat/cholesterol diet?: good    Taking statin?  Yes, muscle aches, possibly from other cause    Other lipid medications/supplements?:  none    Hypertension Follow-up      Outpatient blood pressures are not being checked.    Low Salt Diet: not monitoring salt    BP Readings from Last 2 Encounters:   09/25/18 116/70   04/27/18 136/74     Hemoglobin A1C (%)   Date Value   10/06/2018 7.8 (H)   04/20/2018 7.0 (H)     LDL Cholesterol Calculated (mg/dL)   Date Value   04/20/2018 62   04/05/2017 81       Amount of exercise or physical activity: None    Problems taking medications regularly: No    Medication side effects: none    Diet: regular (no restrictions)      Other problems:   CKD: labs done, stable on meds      Current concerns:   She had been seen at another clinic for some low back pain about 2 weeks ago.  It is really mostly in the back, minimal radiation.  This is much better.  The provider there ordered an MRI but she did not schedule the test as her back was improving.  She has had this intermittently.    She reports she was having a little soreness in her anterior chest when bending recently.  It actually is gone away now for several days.  It was not a burning, no other associated symptoms.    She complains of some soreness at her left elbow flexure.  This is somewhat intermittent, fairly mild.  She notices sometimes it seems a little swollen at the ulnar side just above the  flexure.    Patient Active Problem List   Diagnosis     Essential hypertension, benign     Dermatophytosis of nail     Esophageal reflux     Hip pain     HYPERLIPIDEMIA LDL GOAL <100     CKD (chronic kidney disease) stage 3, GFR 30-59 ml/min (H)     Benign paroxysmal positional vertigo     Morbid obesity (H)     Type 2 diabetes mellitus with stage 3 chronic kidney disease, without long-term current use of insulin (H)       Current Outpatient Prescriptions   Medication Sig Dispense Refill     Acetaminophen (TYLENOL) 325 MG CAPS Take 325-650 mg by mouth every 4 hours as needed       amLODIPine-benazepril (LOTREL) 5-20 MG per capsule Take 1 capsule by mouth daily 90 capsule 2     Ascorbic Acid (VITAMIN C CR) 1000 MG TBCR Take 1 tablet by mouth daily        aspirin 81 MG tablet Take 1 tablet (81 mg) by mouth daily       blood glucose monitoring (NO BRAND SPECIFIED) test strip Use to test blood sugar 1 times daily 100 each 3     Calcium Citrate-Vitamin D (CALCIUM CITRATE + D) 315-200 MG-UNIT TABS Take 1 tablet by mouth 2 times daily.       glimepiride (AMARYL) 4 MG tablet 1 and 1/2 tablets daily 135 tablet 3     metoprolol succinate (TOPROL-XL) 50 MG 24 hr tablet Take 2 tablets (100 mg) by mouth daily 180 tablet 1     Multiple Vitamin (DAILY MULTIVITAMIN PO) Take 1 tablet by mouth daily.       pantoprazole (PROTONIX) 40 MG EC tablet TAKE ONE TABLET BY MOUTH EVERY DAY 90 tablet 3     pioglitazone (ACTOS) 15 MG tablet TAKE ONE TABLET BY MOUTH EVERY DAY 90 tablet 1     pravastatin (PRAVACHOL) 40 MG tablet Take 1 tablet (40 mg) by mouth daily 90 tablet 2     tolterodine (DETROL LA) 4 MG 24 hr capsule TAKE ONE CAPSULE BY MOUTH ONCE DAILY 90 capsule 3       Social History   Substance Use Topics     Smoking status: Former Smoker     Smokeless tobacco: Never Used      Comment: quit 1995     Alcohol use No        ROS:  General: no fever, chills  Weight: stable  Vision:negative. Last eye exam 5/17.  ENT: negative  Respiratory  negative.  Cardiac: no chest pain or pressure  Abdominal: no nausea, vomiting, abdominal pain, bowel changes  Vascular no complaints of claudication  Neurologic:intermittent tingling,  Feet no lesions, in grown nails, edema   : no polyuria, hematuria, dysuria    Objective:  Patient alert in NAD  /72  Pulse 64  Temp 98.6  F (37  C) (Oral)  Resp 24  Wt 187 lb 9.6 oz (85.1 kg)  SpO2 95%  BMI 36.64 kg/m2       Wt Readings from Last 4 Encounters:   10/08/18 187 lb 9.6 oz (85.1 kg)   09/25/18 189 lb (85.7 kg)   04/27/18 187 lb 9.6 oz (85.1 kg)   10/09/17 187 lb 3.2 oz (84.9 kg)       CV: CV: normal S1, S2 with 1/6 WYATT, no  S3 or S4.  Carotid pulses: full  LUNGS: clear  No significant elbow joint tenderness, minimal tenderness around the lateral epicondyles, mild tenderness with full stretches of the muscles.  There is a little fleshy area at the medial upper arm, above the elbow flexure, feels like fat deposit.    Lab Results   Component Value Date    A1C 7.8 10/06/2018    A1C 7.0 04/20/2018    A1C 7.3 10/05/2017    A1C 7.0 04/05/2017    A1C 7.1 09/20/2016       Lab Results   Component Value Date    CHOL 137 04/20/2018    HDL 54 04/20/2018    LDL 62 04/20/2018    TRIG 104 04/20/2018    CHOLHDLRATIO 2.3 03/24/2015           ASSESSMENT/PLAN:             1. Type 2 diabetes mellitus with stage 3 chronic kidney disease, without long-term current use of insulin (H)  Not well controlled, has gone up.  Recommend increase glimepiride to 8 mg daily.  Check some sugars in the evening, call if not improving.  - glimepiride (AMARYL) 4 MG tablet; Take 2 tablets (8 mg) by mouth every morning (before breakfast)  Dispense: 180 tablet; Refill: 3  - pioglitazone (ACTOS) 15 MG tablet; Take 1 tablet (15 mg) by mouth daily  Dispense: 90 tablet; Refill: 3    2. BENIGN HYPERTENSION  Well-controlled, continue medication  - amLODIPine-benazepril (LOTREL) 5-20 MG per capsule; Take 1 capsule by mouth daily  Dispense: 90 capsule;  Refill: 3  - metoprolol succinate (TOPROL-XL) 50 MG 24 hr tablet; Take 2 tablets (100 mg) by mouth daily  Dispense: 180 tablet; Refill: 3    3. Hyperlipidemia LDL goal <100  Well-controlled continue medication  - pravastatin (PRAVACHOL) 40 MG tablet; Take 1 tablet (40 mg) by mouth daily  Dispense: 90 tablet; Refill: 3    4. Bilateral low back pain without sciatica, unspecified chronicity  Symptoms have resolved, probably a strain, advised that she does not need to schedule the MRI, test canceled    5. Chest wall pain  Reassured that likely this was chest wall pain since it was only occurring with certain movements, not likely to be heartburn given she is on PPI and her symptoms are doing well.    6. Left elbow pain  Probably some muscular pain, given some exercises    7. . Need for prophylactic vaccination and inoculation against influenza  done  - FLU VACCINE, INCREASED ANTIGEN, PRESV FREE, AGE 65+ [80257]  - Vaccine Administration, Initial [64048]  - ADMIN INFLUENZA (For MEDICARE Patients ONLY) []        Brandi Rai MD  [facility]      Injectable Influenza Immunization Documentation    1.  Is the person to be vaccinated sick today?   No    2. Does the person to be vaccinated have an allergy to a component   of the vaccine?   No  Egg Allergy Algorithm Link    3. Has the person to be vaccinated ever had a serious reaction   to influenza vaccine in the past?   No    4. Has the person to be vaccinated ever had Guillain-Barré syndrome?   No    Form completed by  Anthony Dunne CMA'

## 2018-10-08 NOTE — PATIENT INSTRUCTIONS
Lateral Epicondylitis (Tennis Elbow)   Rehabilitation Exercises   You may do the stretching exercises right away. You may do the strengthening exercises when stretching is nearly painless.   Stretching exercises     Wrist range of motion: Bend your wrist forward and backward as far as you can. Do 3 sets of 10.     Pronation and supination of the forearm: With your elbow bent 90 , turn your palm upward and hold for 5 seconds. Slowly turn your palm downward and hold for 5 seconds. Make sure you keep your elbow at your side and bent 90  throughout this exercise. Do 3 sets of 10.     Elbow range of motion: Gently bring your palm up toward your shoulder and bend your elbow as far as you can. Then straighten your elbow as far as you can 10 times. Do 3 sets of 10.   Strengthening exercises     Wrist flexion exercise: Hold a can or hammer handle in your hand with your palm facing up. Bend your wrist upward. Slowly lower the weight and return to the starting position. Do 3 sets of 10. Gradually increase the weight of the can or weight you are holding.     Wrist extension exercise: Hold a soup can or hammer handle in your hand with your palm facing down. Slowly bend your wrist upward. Slowly lower the weight down into the starting position. Do 3 sets of 10. Gradually increase the weight of the object you are holding.     Wrist radial deviation strengthening: Put your wrist in the sideways position with your thumb up. Hold a can of soup or a hammer handle and gently bend your wrist up, with the thumb reaching toward the ceiling. Slowly lower to the starting position. Do not move your forearm throughout this exercise. Do 3 sets of 10.     Forearm pronation and supination strengthening: Hold a soup can or hammer handle in your hand and bend your elbow 90 . Slowly rotate your hand with your palm upward and then palm down. Do 3 sets of 10.     Wrist extension (with broom handle): Stand up and hold a broom handle in  both hands. With your arms at shoulder level, elbows straight and palms down, roll the broom handle backward in your hand as if you are reeling something in using a broom handle. Do 3 sets of 10.   Written by Yaa Disla MS, PT, for [employer].   Published by [employer].   This content is reviewed periodically and is subject to change as new health information becomes available. The information is intended to inform and educate and is not a replacement for medical evaluation, advice, diagnosis or treatment by a healthcare professional.   Sports Medicine Advisor 2003.1 Index  Sports Medicine Advisor 2003.1 Credits   Copyright   2003 [employer]. All rights reserved.

## 2018-10-08 NOTE — NURSING NOTE
/72  Pulse 64  Temp 98.6  F (37  C) (Oral)  Resp 24  Wt 187 lb 9.6 oz (85.1 kg)  SpO2 95%  BMI 36.64 kg/m2

## 2018-10-19 ENCOUNTER — TELEPHONE (OUTPATIENT)
Dept: INTERNAL MEDICINE | Facility: CLINIC | Age: 78
End: 2018-10-19

## 2018-10-19 NOTE — TELEPHONE ENCOUNTER
Reason for Call:  Medication or medication refill:    Do you use a [facility]?  Name of the pharmacy and phone number for the current request:  [facility] [address] in [place]    Name of the medication requested: change in meds for cholesterol from pravastatin to something else.    Other request: Patient states she leaves for [place] for the winter, leaving 11/7, would like this change done before she goes. Patient is having muscle pain as a side effect.    Can we leave a detailed message on this number? YES    Phone number patient can be reached at: Cell number on file:    Telephone Information:   Mobile [phone], [phone] (home) - Cell is indio [place] Time: any    Call taken on 10/19/2018 at 2:14 PM by Helen Wyatt

## 2018-10-22 NOTE — TELEPHONE ENCOUNTER
Spoke to daughter-in-law Sue - consent to communicate on file. Informed her that Dr. Lee recommends holding medication for 2-4 weeks to see if muscle aches improved. Sue states patient has done this in the past and muscle aches do get better. She is not sure if she has taken anything else is and not sure if there is another category of drugs she could try. Patient is going to be leaving 11/7/18 and will gone for 5 months. Sue would like to have Dr. Rai review this message when she gets back since she is familiar with patient's history.

## 2018-10-22 NOTE — TELEPHONE ENCOUNTER
Pravastatin is the least likely of all statins to cause muscle pains.   She may try holding her pravastatin for 2-4 weeks, then let us know whether muscle aches have resolved.     Please advise pt of above.

## 2018-10-25 NOTE — TELEPHONE ENCOUNTER
Attempted to contact pt (or Sue). Left message to call clinic.     Not sure if pt is having the pain now or if sx are better.

## 2018-10-26 NOTE — TELEPHONE ENCOUNTER
Daughter-in-law calls back. Informed of Dr. Rai's note. She will inform patient to begin taking 1/2 dose again as her symptoms have improved.

## 2018-10-30 DIAGNOSIS — E11.22 TYPE 2 DIABETES MELLITUS WITH STAGE 3 CHRONIC KIDNEY DISEASE, WITHOUT LONG-TERM CURRENT USE OF INSULIN (H): ICD-10-CM

## 2018-10-30 DIAGNOSIS — N18.30 TYPE 2 DIABETES MELLITUS WITH STAGE 3 CHRONIC KIDNEY DISEASE, WITHOUT LONG-TERM CURRENT USE OF INSULIN (H): ICD-10-CM

## 2018-10-30 NOTE — TELEPHONE ENCOUNTER
"Requested Prescriptions   Pending Prescriptions Disp Refills     blood glucose monitoring (NO BRAND SPECIFIED) test strip  Last Written Prescription Date:  10/09/17  Last Fill Quantity: 100,  # refills: 3   Last office visit: 10/8/2018 with prescribing provider:  10/08/18   Future Office Visit:     100 each 3     Sig: Use to test blood sugar 1 times daily    Diabetic Supplies Protocol Passed    10/30/2018  8:55 AM       Passed - Patient is 18 years of age or older       Passed - Recent (6 mo) or future (30 days) visit within the authorizing provider's specialty    Patient had office visit in the last 6 months or has a visit in the next 30 days with authorizing provider.  See \"Patient Info\" tab in inbasket, or \"Choose Columns\" in Meds & Orders section of the refill encounter.              "

## 2018-11-01 NOTE — TELEPHONE ENCOUNTER
DaughterSue calls regarding order, patient is leaving soon for 6 months. They received override from insurance to allow pharmacy to fill 2 - 3 month prescriptions for patient now, but they need a new order placed.     Prescription approved per [facility] Refill Protocol.

## 2019-04-09 ENCOUNTER — TELEPHONE (OUTPATIENT)
Dept: INTERNAL MEDICINE | Facility: CLINIC | Age: 79
End: 2019-04-09

## 2019-04-09 NOTE — TELEPHONE ENCOUNTER
Per PCP- pt need to come in for fasting appt a few days prior to seeing PCP in office.     Closest clinic to pt's home is [facility], please assist with scheduling.

## 2019-04-15 ENCOUNTER — PATIENT OUTREACH (OUTPATIENT)
Dept: CARE COORDINATION | Facility: CLINIC | Age: 79
End: 2019-04-15

## 2019-04-15 ENCOUNTER — OFFICE VISIT (OUTPATIENT)
Dept: INTERNAL MEDICINE | Facility: CLINIC | Age: 79
End: 2019-04-15
Payer: COMMERCIAL

## 2019-04-15 VITALS
RESPIRATION RATE: 16 BRPM | HEART RATE: 100 BPM | WEIGHT: 185 LBS | OXYGEN SATURATION: 100 % | TEMPERATURE: 98.1 F | SYSTOLIC BLOOD PRESSURE: 138 MMHG | HEIGHT: 59 IN | DIASTOLIC BLOOD PRESSURE: 82 MMHG | BODY MASS INDEX: 37.29 KG/M2

## 2019-04-15 DIAGNOSIS — R35.0 URINARY FREQUENCY: ICD-10-CM

## 2019-04-15 DIAGNOSIS — M54.50 BILATERAL LOW BACK PAIN WITHOUT SCIATICA, UNSPECIFIED CHRONICITY: ICD-10-CM

## 2019-04-15 DIAGNOSIS — I10 ESSENTIAL HYPERTENSION, BENIGN: ICD-10-CM

## 2019-04-15 DIAGNOSIS — E66.01 MORBID OBESITY (H): ICD-10-CM

## 2019-04-15 DIAGNOSIS — E11.22 TYPE 2 DIABETES MELLITUS WITH STAGE 3 CHRONIC KIDNEY DISEASE, WITHOUT LONG-TERM CURRENT USE OF INSULIN (H): ICD-10-CM

## 2019-04-15 DIAGNOSIS — N18.30 TYPE 2 DIABETES MELLITUS WITH STAGE 3 CHRONIC KIDNEY DISEASE, WITHOUT LONG-TERM CURRENT USE OF INSULIN (H): ICD-10-CM

## 2019-04-15 DIAGNOSIS — E11.22 TYPE 2 DIABETES MELLITUS WITH STAGE 3 CHRONIC KIDNEY DISEASE, WITHOUT LONG-TERM CURRENT USE OF INSULIN (H): Primary | ICD-10-CM

## 2019-04-15 DIAGNOSIS — N18.30 CKD (CHRONIC KIDNEY DISEASE) STAGE 3, GFR 30-59 ML/MIN (H): Primary | ICD-10-CM

## 2019-04-15 DIAGNOSIS — K21.9 GASTROESOPHAGEAL REFLUX DISEASE WITHOUT ESOPHAGITIS: ICD-10-CM

## 2019-04-15 DIAGNOSIS — E78.5 HYPERLIPIDEMIA LDL GOAL <100: ICD-10-CM

## 2019-04-15 DIAGNOSIS — N18.30 CKD (CHRONIC KIDNEY DISEASE) STAGE 3, GFR 30-59 ML/MIN (H): ICD-10-CM

## 2019-04-15 DIAGNOSIS — R06.09 DYSPNEA ON EXERTION: ICD-10-CM

## 2019-04-15 DIAGNOSIS — R60.9 EDEMA, UNSPECIFIED TYPE: ICD-10-CM

## 2019-04-15 DIAGNOSIS — N18.30 TYPE 2 DIABETES MELLITUS WITH STAGE 3 CHRONIC KIDNEY DISEASE, WITHOUT LONG-TERM CURRENT USE OF INSULIN (H): Primary | ICD-10-CM

## 2019-04-15 LAB
ANION GAP SERPL CALCULATED.3IONS-SCNC: 7 MMOL/L (ref 3–14)
BUN SERPL-MCNC: 13 MG/DL (ref 7–30)
CALCIUM SERPL-MCNC: 9.1 MG/DL (ref 8.5–10.1)
CHLORIDE SERPL-SCNC: 111 MMOL/L (ref 94–109)
CHOLEST SERPL-MCNC: 160 MG/DL
CO2 SERPL-SCNC: 25 MMOL/L (ref 20–32)
CREAT SERPL-MCNC: 0.97 MG/DL (ref 0.52–1.04)
GFR SERPL CREATININE-BSD FRML MDRD: 57 ML/MIN/{1.73_M2}
GLUCOSE SERPL-MCNC: 140 MG/DL (ref 70–99)
HBA1C MFR BLD: 7.3 % (ref 0–5.6)
HDLC SERPL-MCNC: 55 MG/DL
LDLC SERPL CALC-MCNC: 85 MG/DL
NONHDLC SERPL-MCNC: 105 MG/DL
POTASSIUM SERPL-SCNC: 4.2 MMOL/L (ref 3.4–5.3)
SODIUM SERPL-SCNC: 143 MMOL/L (ref 133–144)
TRIGL SERPL-MCNC: 100 MG/DL

## 2019-04-15 PROCEDURE — 99215 OFFICE O/P EST HI 40 MIN: CPT | Performed by: INTERNAL MEDICINE

## 2019-04-15 PROCEDURE — 83036 HEMOGLOBIN GLYCOSYLATED A1C: CPT | Performed by: INTERNAL MEDICINE

## 2019-04-15 PROCEDURE — 80061 LIPID PANEL: CPT | Performed by: INTERNAL MEDICINE

## 2019-04-15 PROCEDURE — 80048 BASIC METABOLIC PNL TOTAL CA: CPT | Performed by: INTERNAL MEDICINE

## 2019-04-15 PROCEDURE — 36415 COLL VENOUS BLD VENIPUNCTURE: CPT | Performed by: INTERNAL MEDICINE

## 2019-04-15 PROCEDURE — 99207 C FOOT EXAM  NO CHARGE: CPT | Performed by: INTERNAL MEDICINE

## 2019-04-15 RX ORDER — BENAZEPRIL HYDROCHLORIDE 20 MG/1
20 TABLET ORAL DAILY
Qty: 30 TABLET | Refills: 2 | Status: SHIPPED | OUTPATIENT
Start: 2019-04-15 | End: 2019-07-17

## 2019-04-15 RX ORDER — PANTOPRAZOLE SODIUM 40 MG/1
40 TABLET, DELAYED RELEASE ORAL DAILY
Qty: 90 TABLET | Refills: 3 | Status: SHIPPED | OUTPATIENT
Start: 2019-04-15 | End: 2019-10-14

## 2019-04-15 RX ORDER — TOLTERODINE 4 MG/1
CAPSULE, EXTENDED RELEASE ORAL
Qty: 90 CAPSULE | Refills: 3 | Status: SHIPPED | OUTPATIENT
Start: 2019-04-15 | End: 2019-10-14

## 2019-04-15 ASSESSMENT — ACTIVITIES OF DAILY LIVING (ADL): DEPENDENT_IADLS:: INDEPENDENT

## 2019-04-15 ASSESSMENT — MIFFLIN-ST. JEOR: SCORE: 1229.78

## 2019-04-15 NOTE — PROGRESS NOTES
SUBJECTIVE:   Janay Kyle is a 78 year old female who presents to clinic today for the following   health issues:      Diabetes Follow-up    Patient is checking blood sugars: once daily.  Results are as follows:         am - 126    Diabetic concerns: none     Symptoms of hypoglycemia (low blood sugar): shaky, dizzy, weak, sweaty,      Paresthesias (numbness or burning in feet) or sores: Yes feet      Date of last diabetic eye exam: 2 years ago will be going to see one this time    BP Readings from Last 2 Encounters:   10/08/18 118/72   09/25/18 116/70     Hemoglobin A1C (%)   Date Value   04/15/2019 7.3 (H)   10/06/2018 7.8 (H)     LDL Cholesterol Calculated (mg/dL)   Date Value   04/20/2018 62   04/05/2017 81       Diabetes Management Resources  Hyperlipidemia Follow-Up      Rate your low fat/cholesterol diet?: not monitoring fat    Taking statin?  Yes, muscle aches, possibly from other cause    Other lipid medications/supplements?:  none    Hypertension Follow-up      Outpatient blood pressures are not being checked.    Low Salt Diet: not monitoring salt      Amount of exercise or physical activity: waking    Problems taking medications regularly: No    Medication side effects: muscle aches    Diet: regular (no restrictions)      Other problems:   CKD: stable  Morbid obesity: weight is stable        Current concerns:   1.  She complains of gradually worsening dyspnea on exertion.  She has had this for over a year, bothers her only with exertion, not at rest.  Is relatively short distances and stairs.  She does not have PND or orthopnea.  She will sometimes feel like her heart goes fast when she is short of breath but no pain.  2. Edema: She spends half the year in [place] and always gets some swelling when she first goes down there but this year did not go away.  She was evaluated there and had a negative ultrasound but they did not do anything to treat the edema.  There is been slight improvement since she  has been back up here, it will decrease when she lies down but does not go away.  The right is worse than the left.  No pain in the legs.  3.  She has been having issues with her back and hip and was evaluated in [place], had an injection for trochanteric bursitis.  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  She is living with her daughter up in [place] and would like to go to physician neck and back clinic.  4.  She was having some pain in her feet that may be related to the back and hip.  She has had some numbness in her feet.  She was given some type of medication but it made her tired.  Her daughter is not sure if it might be gabapentin.    She has not had labs done yet.    Patient Active Problem List   Diagnosis     Essential hypertension, benign     Esophageal reflux     HYPERLIPIDEMIA LDL GOAL <100     CKD (chronic kidney disease) stage 3, GFR 30-59 ml/min (H)     Benign paroxysmal positional vertigo     Morbid obesity (H)     Type 2 diabetes mellitus with stage 3 chronic kidney disease, without long-term current use of insulin (H)       Current Outpatient Medications   Medication Sig Dispense Refill     Acetaminophen (TYLENOL) 325 MG CAPS Take 325-650 mg by mouth every 4 hours as needed       amLODIPine-benazepril (LOTREL) 5-20 MG per capsule Take 1 capsule by mouth daily 90 capsule 3     Ascorbic Acid (VITAMIN C CR) 1000 MG TBCR Take 1 tablet by mouth daily        aspirin 81 MG tablet Take 1 tablet (81 mg) by mouth daily       blood glucose monitoring (ONETOUCH ULTRA) test strip Use to test blood sugar 1 times daily or as directed. 100 strip 1     Calcium Citrate-Vitamin D (CALCIUM CITRATE + D) 315-200 MG-UNIT TABS Take 1 tablet by mouth 2 times daily.       glimepiride (AMARYL) 4 MG tablet Take 2 tablets (8 mg) by mouth every morning (before breakfast) 180 tablet 3     metoprolol succinate (TOPROL-XL) 50 MG 24 hr tablet Take 2 tablets (100 mg) by mouth daily  "180 tablet 3     Multiple Vitamin (DAILY MULTIVITAMIN PO) Take 1 tablet by mouth daily.       pantoprazole (PROTONIX) 40 MG EC tablet TAKE ONE TABLET BY MOUTH EVERY DAY 90 tablet 3     pioglitazone (ACTOS) 15 MG tablet Take 1 tablet (15 mg) by mouth daily 90 tablet 3     pravastatin (PRAVACHOL) 40 MG tablet Take 1 tablet (40 mg) by mouth daily 90 tablet 3     tolterodine (DETROL LA) 4 MG 24 hr capsule TAKE ONE CAPSULE BY MOUTH ONCE DAILY 90 capsule 3       Social History     Tobacco Use     Smoking status: Former Smoker     Smokeless tobacco: Never Used     Tobacco comment: quit 1995   Substance Use Topics     Alcohol use: No     Drug use: No        ROS:  General: no fever, chills  Weight: stable  Vision:negative. Last eye exam 2 years ago.  ENT: negative  Respiratory complains of dyspnea.  Cardiac: no chest pain or pressure  Abdominal: no nausea, vomiting, abdominal pain, bowel changes  Vascular no complaints of claudication  Neurologic:some complaints of neuropathy  Feet no lesions, in grown nails, positive edema   : no polyuria, hematuria, dysuria    Objective:  Patient alert in NAD  /82   Pulse 100   Temp 98.1  F (36.7  C) (Oral)   Resp 16   Ht 1.499 m (4' 11\")   Wt 83.9 kg (185 lb)   SpO2 100%   BMI 37.37 kg/m         Wt Readings from Last 4 Encounters:   04/15/19 83.9 kg (185 lb)   10/08/18 85.1 kg (187 lb 9.6 oz)   09/25/18 85.7 kg (189 lb)   04/27/18 85.1 kg (187 lb 9.6 oz)       CV: CV: normal S1, S2 without murmur, S3 or S4.  Carotid pulses: full  LUNGS: clear  2+ edema   Feet: pulses full, normal capillary refill  No lesions, sores or skin changes  Nails normal  Sensation able to feel fine filament    Lab Results   Component Value Date    A1C 7.8 10/06/2018    A1C 7.0 04/20/2018    A1C 7.3 10/05/2017    A1C 7.0 04/05/2017       Lab Results   Component Value Date    CHOL 137 04/20/2018    HDL 54 04/20/2018    LDL 62 04/20/2018    TRIG 104 04/20/2018    CHOLHDLRATIO 2.3 03/24/2015 "             ASSESSMENT/PLAN:         1. Type 2 diabetes mellitus with stage 3 chronic kidney disease, without long-term current use of insulin (H)  Unclear control, will need to get labs back and then can contact her about adjusting medication if necessary  - blood glucose (ONETOUCH ULTRA) test strip; Use to test blood sugar 1 times daily or as directed.  Dispense: 100 strip; Refill: 3  - FOOT EXAM    2. CKD (chronic kidney disease) stage 3, GFR 30-59 ml/min (H)  Recheck labs pending    3. Morbid obesity (H)  Minimal weight loss, work on trying to increase exercise    4. Dyspnea on exertion  This is been very gradual onset, oxygen saturation is good, lungs are clear.  Need to evaluate for the possibility of cardiac disease, but this may well be related to deconditioning, may be some restriction from obesity.  Will check cardiac studies, call if any abrupt worsening but if critical present to ED.  - Echocardiogram Complete; Future  - NM Lexiscan stress test; Future    5. Essential hypertension, benign  Adequately controlled, adjust medication as below because of the edema  - benazepril (LOTENSIN) 20 MG tablet; Take 1 tablet (20 mg) by mouth daily  Dispense: 30 tablet; Refill: 2    6. Hyperlipidemia LDL goal <100  Labs due    7. Gastroesophageal reflux disease without esophagitis  stable  - pantoprazole (PROTONIX) 40 MG EC tablet; Take 1 tablet (40 mg) by mouth daily  Dispense: 90 tablet; Refill: 3    8. Bilateral low back pain without sciatica, unspecified chronicity  Refer to physical therapy  - PHYSICAL THERAPY REFERRAL; Future    9. Urinary frequency    - tolterodine ER (DETROL LA) 4 MG 24 hr capsule; TAKE ONE CAPSULE BY MOUTH ONCE DAILY  Dispense: 90 capsule; Refill: 3    10. Edema, unspecified type  Probably venous insufficiency, recommend stop the amlodipine component and increase benazapril  Recommend trial of compression stockings since the symptoms are not that significant, would possibly consider adding a  diuretic in the future as it may help blood pressures well.  - order for DME; Equipment being ordered: knee high teds 20-30 mmHg, 2 pair  Dispense: 2 each; Refill: 1        Brandi Rai MD  [facility]

## 2019-04-15 NOTE — PROGRESS NOTES
Clinic Care Coordination Contact    Clinic Care Coordination Contact  OUTREACH    Referral Information:  Referral Source: Care Team    Primary Diagnosis: Psychosocial    Chief Complaint   Patient presents with     Clinic Care Coordination - Face To Face     Advanced Directive and Resources      Universal Utilization: Appropriate per chart review. No noted concerns.  Difficulty keeping appointments: No  Compliance Concerns: No  No-Show Concerns: No  No PCP office visit in Past Year: No  Utilization    Last refreshed: 4/15/2019  2:02 PM:  Hospital Admissions 0           Last refreshed: 4/15/2019  2:02 PM:  ED Visits 0           Last refreshed: 4/15/2019  2:02 PM:  No Show Count (past year) 0              Current as of: 4/15/2019  2:02 PM            Clinical Concerns:  Current Medical Concerns:      Current Behavioral Concerns: Patient presents to the clinic for Diabetes Follow-up.    Education Provided to patient: Role of ELLE GATES.   Health Maintenance Reviewed: Not assessed  Clinical Pathway: None    Transportation:  Transportation concerns: Yes  Transportation means: [employer], Regular car    Pt's ex dtr-in-law is Pt's primary support for half of the year. She reports that Pt attends outpatient therapy and is wondering what transportation options may be available if she cannot transport her. We discussed using [employer], [employer] transportation, Uber, and a Taxi. ELLE GATES encouraged Pt and family to submit the [employer] application to the clinic if she would like to pursue that option.    Resources and Interventions:  Referrals Placed: Transportation, Honoring Choices  Honoring [employer] document provided to Patient and dtr. Dtr feels comfortable navigating the document.    Advance Care Plan/Directive  Advanced Care Plans/Directives on file: No  Advanced Care Plan/Directive Status: Referral to Honoring [employer] Facilitator    Patient/Caregiver understanding: Pt reports understanding and denies any additional  questions or concerns at this times. SW CC engaged in AIDET communication during encounter.    Plan: SW CC will remain available for follow-up questions related to [employer] and Honoring Choices. No further outreaches will be made at this time unless a new referral is made or a change in the pt's status occurs. Patient was provided with this writer's contact information and encouraged to call with any questions or concerns.    Tiffany Tena, [facility]  Clinic Care Coordinator  Ph. [phone]  [email or web address]

## 2019-04-15 NOTE — NURSING NOTE
"Vital signs:  Temp: 98.1  F (36.7  C) Temp src: Oral BP: 138/82 Pulse: 100   Resp: 16 SpO2: 100 %     Height: 149.9 cm (4' 11\") Weight: 83.9 kg (185 lb)  Estimated body mass index is 37.37 kg/m  as calculated from the following:    Height as of this encounter: 1.499 m (4' 11\").    Weight as of this encounter: 83.9 kg (185 lb).          "

## 2019-04-18 ENCOUNTER — TELEPHONE (OUTPATIENT)
Dept: INTERNAL MEDICINE | Facility: CLINIC | Age: 79
End: 2019-04-18

## 2019-04-18 NOTE — TELEPHONE ENCOUNTER
Reason for call:  Results     Patient's daughter in law Rafael Edwards would like a call back regarding test results. She would like to know the results of test results. Consent to Communicate last signed 4/7/17.    Phone number to reach patient:  Other phone number: [phone]    Can we leave a detailed message on this number?  YES

## 2019-04-18 NOTE — TELEPHONE ENCOUNTER
Left detail message that Dr Rai have not review results yet. We will call once results are review and advise is given by Dr Rai.

## 2019-05-11 DIAGNOSIS — I10 ESSENTIAL HYPERTENSION, BENIGN: ICD-10-CM

## 2019-05-13 RX ORDER — BENAZEPRIL HYDROCHLORIDE 20 MG/1
TABLET ORAL
Qty: 90 TABLET | Refills: 2 | OUTPATIENT
Start: 2019-05-13

## 2019-05-13 NOTE — TELEPHONE ENCOUNTER
"Requested Prescriptions   Pending Prescriptions Disp Refills     benazepril (LOTENSIN) 20 MG tablet [Pharmacy Med Name: BENAZEPRIL 20MG TABLETS] 90 tablet 2     Sig: TAKE 1 TABLET(20 MG) BY MOUTH DAILY   Last Written Prescription Date:  04/15/2019  Last Fill Quantity: 30,  # refills: 2   Last office visit: 4/15/2019 with prescribing provider:     Future Office Visit:      ACE Inhibitors (Including Combos) Protocol Passed - 5/11/2019  2:20 PM        Passed - Blood pressure under 140/90 in past 12 months     BP Readings from Last 3 Encounters:   04/15/19 138/82   10/08/18 118/72   09/25/18 116/70                 Passed - Recent (12 mo) or future (30 days) visit within the authorizing provider's specialty     Patient had office visit in the last 12 months or has a visit in the next 30 days with authorizing provider or within the authorizing provider's specialty.  See \"Patient Info\" tab in inbasket, or \"Choose Columns\" in Meds & Orders section of the refill encounter.              Passed - Medication is active on med list        Passed - Patient is age 18 or older        Passed - No active pregnancy on record        Passed - Normal serum creatinine on file in past 12 months     Recent Labs   Lab Test 04/15/19  0824   CR 0.97             Passed - Normal serum potassium on file in past 12 months     Recent Labs   Lab Test 04/15/19  0824   POTASSIUM 4.2             Passed - No positive pregnancy test within past 12 months        "

## 2019-05-17 ENCOUNTER — MYC MEDICAL ADVICE (OUTPATIENT)
Dept: INTERNAL MEDICINE | Facility: CLINIC | Age: 79
End: 2019-05-17

## 2019-05-22 ENCOUNTER — HOSPITAL ENCOUNTER (OUTPATIENT)
Dept: NUCLEAR MEDICINE | Facility: CLINIC | Age: 79
Setting detail: NUCLEAR MEDICINE
End: 2019-05-22
Attending: INTERNAL MEDICINE
Payer: COMMERCIAL

## 2019-05-22 ENCOUNTER — HOSPITAL ENCOUNTER (OUTPATIENT)
Dept: CARDIOLOGY | Facility: CLINIC | Age: 79
Discharge: HOME | End: 2019-05-22
Attending: INTERNAL MEDICINE | Admitting: INTERNAL MEDICINE
Payer: COMMERCIAL

## 2019-05-22 ENCOUNTER — HOSPITAL ENCOUNTER (OUTPATIENT)
Dept: NUCLEAR MEDICINE | Facility: CLINIC | Age: 79
Setting detail: NUCLEAR MEDICINE
Discharge: HOME | End: 2019-05-22
Attending: INTERNAL MEDICINE | Admitting: INTERNAL MEDICINE
Payer: COMMERCIAL

## 2019-05-22 DIAGNOSIS — R06.09 DYSPNEA ON EXERTION: ICD-10-CM

## 2019-05-22 PROCEDURE — 78452 HT MUSCLE IMAGE SPECT MULT: CPT | Mod: 26 | Performed by: INTERNAL MEDICINE

## 2019-05-22 PROCEDURE — 34300033 ZZH RX 343: Performed by: INTERNAL MEDICINE

## 2019-05-22 PROCEDURE — 78452 HT MUSCLE IMAGE SPECT MULT: CPT

## 2019-05-22 PROCEDURE — 93306 TTE W/DOPPLER COMPLETE: CPT

## 2019-05-22 PROCEDURE — 93017 CV STRESS TEST TRACING ONLY: CPT

## 2019-05-22 PROCEDURE — A9502 TC99M TETROFOSMIN: HCPCS | Performed by: INTERNAL MEDICINE

## 2019-05-22 PROCEDURE — 93016 CV STRESS TEST SUPVJ ONLY: CPT | Performed by: INTERNAL MEDICINE

## 2019-05-22 PROCEDURE — 93306 TTE W/DOPPLER COMPLETE: CPT | Mod: 26 | Performed by: INTERNAL MEDICINE

## 2019-05-22 PROCEDURE — 25000128 H RX IP 250 OP 636: Performed by: INTERNAL MEDICINE

## 2019-05-22 PROCEDURE — 93018 CV STRESS TEST I&R ONLY: CPT | Performed by: INTERNAL MEDICINE

## 2019-05-22 RX ORDER — REGADENOSON 0.08 MG/ML
0.4 INJECTION, SOLUTION INTRAVENOUS ONCE
Status: COMPLETED | OUTPATIENT
Start: 2019-05-22 | End: 2019-05-22

## 2019-05-22 RX ORDER — ACYCLOVIR 200 MG/1
0-1 CAPSULE ORAL
Status: DISCONTINUED | OUTPATIENT
Start: 2019-05-22 | End: 2019-05-23 | Stop reason: HOSPADM

## 2019-05-22 RX ORDER — ALBUTEROL SULFATE 90 UG/1
2 AEROSOL, METERED RESPIRATORY (INHALATION) EVERY 5 MIN PRN
Status: DISCONTINUED | OUTPATIENT
Start: 2019-05-22 | End: 2019-05-23 | Stop reason: HOSPADM

## 2019-05-22 RX ORDER — AMINOPHYLLINE 25 MG/ML
50-100 INJECTION, SOLUTION INTRAVENOUS
Status: DISCONTINUED | OUTPATIENT
Start: 2019-05-22 | End: 2019-05-23 | Stop reason: HOSPADM

## 2019-05-22 RX ORDER — REGADENOSON 0.08 MG/ML
0.4 INJECTION, SOLUTION INTRAVENOUS ONCE
Status: DISCONTINUED | OUTPATIENT
Start: 2019-05-22 | End: 2019-05-23 | Stop reason: HOSPADM

## 2019-05-22 RX ADMIN — TETROFOSMIN 41.4 MCI.: 1.38 INJECTION, POWDER, LYOPHILIZED, FOR SOLUTION INTRAVENOUS at 11:13

## 2019-05-22 RX ADMIN — TETROFOSMIN 10.5 MCI.: 1.38 INJECTION, POWDER, LYOPHILIZED, FOR SOLUTION INTRAVENOUS at 10:17

## 2019-05-22 RX ADMIN — REGADENOSON 0.4 MG: 0.08 INJECTION, SOLUTION INTRAVENOUS at 11:12

## 2019-05-22 NOTE — PROGRESS NOTES
Pt here for Lexiscan nuclear stress test.  Medication and side effects reviewed with patient. Lung sounds clear to auscultation bilaterally.  Denied caffeine use.  Patient tolerated Lexiscan dose without any adverse reactions.  Monitored post injection and then taken back to nuclear medicine for follow up imaging.

## 2019-05-24 DIAGNOSIS — R06.00 DYSPNEA, UNSPECIFIED TYPE: Primary | ICD-10-CM

## 2019-06-17 ENCOUNTER — ANCILLARY PROCEDURE (OUTPATIENT)
Dept: CT IMAGING | Facility: CLINIC | Age: 79
End: 2019-06-17
Attending: INTERNAL MEDICINE
Payer: COMMERCIAL

## 2019-06-17 DIAGNOSIS — R06.00 DYSPNEA, UNSPECIFIED TYPE: ICD-10-CM

## 2019-06-25 DIAGNOSIS — E04.1 THYROID NODULE: Primary | ICD-10-CM

## 2019-06-25 DIAGNOSIS — R06.00 DYSPNEA, UNSPECIFIED TYPE: ICD-10-CM

## 2019-06-26 ENCOUNTER — TELEPHONE (OUTPATIENT)
Dept: ENDOCRINOLOGY | Facility: CLINIC | Age: 79
End: 2019-06-26

## 2019-06-26 NOTE — TELEPHONE ENCOUNTER
To schedulers: Please schedule  Her for 7/1/19 at 3 PM with me.  Open my schedule for this appt. thanks    Cheri Posey MD  Endocrine triage      [facility] Call Center    Phone Message    May a detailed message be left on voicemail: yes    Reason for Call: Other: Rafael is calling for her mother in law, new to the clinic,  pt was referred to us by Dr. Brandi Johnson for thyroid nodule, pt has shortness of breath, mass is pushing on that area Dr.Mary Rai saw pt for this and orderd a CT scan, please call Francine today before a quater to 1 as this is the best time to talk to her, thank you     Action Taken: Message routed to:  [facility] Cate

## 2019-06-27 NOTE — TELEPHONE ENCOUNTER
RECORDS RECEIVED FROM: [facility] - Dr [facility]   DATE RECEIVED: 7/1/19   NOTES (FOR ALL VISITS) STATUS DETAILS   OFFICE NOTES from referring provider Internal 4/15/19  10/8/18  4/27/18  10/9/17  (additional encounters in Epic)   OFFICE NOTES from other specialist N/A    ED NOTES N/A    OPERATIVE REPORT  (thyroid, pituitary, adrenal, parathyroid) N/A    MEDICATION LIST Internal    IMAGING      DEXASCAN N/A    MRI (BRAIN) Internal [facility]:  MRI Brain 8/10/16   XR (Chest) N/A    CT (HEAD/NECK/CHEST/ABDOMEN) Internal [facility]:  CT Chest 6/17/19    [facility]:  CT Head 8/10/16   NUCLEAR  N/A    ULTRASOUND (HEAD/NECK) Internal [facility]:  US Abdomen 4/8/15   LABS     DIABETES: HBGA1C, CREATININE, FASTING LIPIDS, MICROALBUMIN URINE, POTASSIUM, TSH, T4    THYROID: TSH, T4, CBC, THYRODLONULIN, TOTAL T3, FREE T4, CALCITONIN, CEA Internal   4/15/19

## 2019-07-01 ENCOUNTER — ANCILLARY PROCEDURE (OUTPATIENT)
Dept: ULTRASOUND IMAGING | Facility: CLINIC | Age: 79
End: 2019-07-01
Payer: COMMERCIAL

## 2019-07-01 ENCOUNTER — PRE VISIT (OUTPATIENT)
Dept: ENDOCRINOLOGY | Facility: CLINIC | Age: 79
End: 2019-07-01

## 2019-07-01 ENCOUNTER — OFFICE VISIT (OUTPATIENT)
Dept: ENDOCRINOLOGY | Facility: CLINIC | Age: 79
End: 2019-07-01
Payer: COMMERCIAL

## 2019-07-01 VITALS
HEART RATE: 87 BPM | WEIGHT: 183.2 LBS | HEIGHT: 59 IN | DIASTOLIC BLOOD PRESSURE: 80 MMHG | BODY MASS INDEX: 36.93 KG/M2 | SYSTOLIC BLOOD PRESSURE: 170 MMHG

## 2019-07-01 DIAGNOSIS — E04.1 RIGHT THYROID NODULE: ICD-10-CM

## 2019-07-01 DIAGNOSIS — J39.8 TRACHEAL COMPRESSION: ICD-10-CM

## 2019-07-01 DIAGNOSIS — R79.89 HIGH SERUM THYROID STIMULATING HORMONE (TSH): ICD-10-CM

## 2019-07-01 DIAGNOSIS — E04.1 RIGHT THYROID NODULE: Primary | ICD-10-CM

## 2019-07-01 LAB
T4 FREE SERPL-MCNC: 0.91 NG/DL (ref 0.76–1.46)
TSH SERPL DL<=0.005 MIU/L-ACNC: 1.55 MU/L (ref 0.4–4)

## 2019-07-01 ASSESSMENT — MIFFLIN-ST. JEOR: SCORE: 1221.87

## 2019-07-01 ASSESSMENT — PAIN SCALES - GENERAL: PAINLEVEL: NO PAIN (0)

## 2019-07-01 NOTE — LETTER
7/1/2019       RE: Janay Kyle  [address] [place] [address]     Dear Colleague,    Thank you for referring your patient, Janay Kyle, to the [facility] ENDOCRINOLOGY at [facility]. Please see a copy of my visit note below.    Endocrine Consult note    Attending Assessment/Plan :     Right thyroid mass. The mass has been present and hasn't changed a great deal with the lst images we have of it in 8/16.  It does show mass effect on the trachea including both deviation and some compression .    Thyroid US following the appt shows the mass is taller than wide.  Recommend FNAB  Information for patients with thyroid nodules given at the appt.     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 This is not critical based on the imaging dimensions.  It is not substernal.  Will pursue FNAB first and recommend inspiratory and expiratory flow loops IF the cytology is benign.  I would be surprised if this is the cause of the SOB symptoms based on what we currently know.    DM2- she in glimepiride and pioglitazone. Not addressed.    Obesity by BMI 37    Cheri Posey MD    Chief complaint:  Janay is a 78 year old female seen in consultation at the request of Dr Brandi Ria for thyroid nodule.     HISTORY OF PRESENT ILLNESS  Janay presents with her daughter in law, who is a nurse working for Dr Kay.    Recent chest CT showed a right thyroid mass, which was also noted to have been present in 2016, though they weren't told of it at the time.  The recent chest CT was performed due to symptoms of SOB. She has never before been told of goiter or thyroid disease.  There is no family history of thyroid cancer or thyroid disease.  Janay grew up in [place];  There is no known childhood irradiation    I have reviewed images on PACS  6/17/19 CT chest: right thyroid >> left  With tracheal deviation  to the left, some tracheal compression . This was also seen on the 8/10/16  CT angio of the head/ neck.  The gland minimally extends substernally.  Maximum widest right lobe is 3.5 x 4.6 x  (was 3.7 x 4.3 in 8/2016 7/1/19 US thyroid (followed appt, not discussed at appt):   Right # 1 1.2 x 1 x 1.3 hyper/isoechoic with thick halo  Right # 2  3.1 x 3.8 x 4- heterogeneous - taller than wide;   Left # 1  1.1 x 1.1 x 1.3 hyperechoic    She has known of the DM since [place] came [place] . Her most recent A1c was 7.3 on 4/15/19.She is on glimepiride and pioglitazone. She was on metformin in the past, apparently self discontinued prior to 7/18/06. The notes from that time cite GI side effects.       REVIEW OF SYSTEMS  Sleep is very interrupted x years - - wakes up , not waking up SOB.  Sleep  Position is on her side; 1 pillow  Weight : stable - ; appetite OK  No tempearture nitolerance  Cardiac:negative;   Respiratory: SOB x 2 years - noted with stairs, walking fast.  No positional or head position SOB  ; sometimes WARREN with trip to bathroom; + coughing if something goes down thoat; no frequent ; + wheezing with activity;   GI:  Constipation and diarrea  Right shoulder pain -sometimes down to the right arm - all the time ; increased with lifting heavy -falls out if lifting; pain sometimes into right neck   + vertigo last 2016 -- fearful this could happen again  +headaches all the time,not now.    10 system ROS otherwise as per the HPI or negative    Past Medical History  Past Medical History:   Diagnosis Date     Arthritis      CKD (chronic kidney disease) stage 3, GFR 30-59 ml/min (H)      Diverticulitis 6/09    seen [facility]     Esophageal reflux      Essential hypertension, benign      Headache(784.0)      History of blood transfusion     1982     Murmur, cardiac     neg echo     Other and unspecified hyperlipidemia      Other chronic pain     low back     Type II or unspecified type diabetes mellitus without mention  of complication, not stated as uncontrolled 1997       Past Surgical History:   Procedure Laterality Date     C NONSPECIFIC PROCEDURE  1983    ANNITA/BSO--fibroid        CATARACT IOL, RT/LT  2009    right cataract     HYSTERECTOMY  1982     LAPAROSCOPIC CHOLECYSTECTOMY N/A 6/2/2015    Procedure: LAPAROSCOPIC CHOLECYSTECTOMY;  Surgeon: Louis Ceballos MD;  Location:  OR       Medications    Current Outpatient Medications   Medication Sig Dispense Refill     Acetaminophen (TYLENOL) 325 MG CAPS Take 325-650 mg by mouth every 4 hours as needed       Ascorbic Acid (VITAMIN C CR) 1000 MG TBCR Take 1 tablet by mouth daily        aspirin 81 MG tablet Take 1 tablet (81 mg) by mouth daily       benazepril (LOTENSIN) 20 MG tablet Take 1 tablet (20 mg) by mouth daily 30 tablet 2     blood glucose (ONETOUCH ULTRA) test strip Use to test blood sugar 1 times daily or as directed. 100 strip 3     Calcium Citrate-Vitamin D (CALCIUM CITRATE + D) 315-200 MG-UNIT TABS Take 1 tablet by mouth 2 times daily.       glimepiride (AMARYL) 4 MG tablet Take 2 tablets (8 mg) by mouth every morning (before breakfast) 180 tablet 3     metoprolol succinate (TOPROL-XL) 50 MG 24 hr tablet Take 2 tablets (100 mg) by mouth daily 180 tablet 3     Multiple Vitamin (DAILY MULTIVITAMIN PO) Take 1 tablet by mouth daily.       order for DME Equipment being ordered: knee high teds 20-30 mmHg, 2 pair 2 each 1     pantoprazole (PROTONIX) 40 MG EC tablet Take 1 tablet (40 mg) by mouth daily 90 tablet 3     pioglitazone (ACTOS) 15 MG tablet Take 1 tablet (15 mg) by mouth daily 90 tablet 3     pravastatin (PRAVACHOL) 40 MG tablet Take 1 tablet (40 mg) by mouth daily 90 tablet 3     tolterodine ER (DETROL LA) 4 MG 24 hr capsule TAKE ONE CAPSULE BY MOUTH ONCE DAILY 90 capsule 3       Allergies  Allergies   Allergen Reactions     Omeprazole Rash       Family History  family history includes Arthritis in her mother; Breast Cancer in her sister; Cerebrovascular  "Disease in her sister; Emphysema in her sister; Prostate Cancer in her brother; Unknown/Adopted in her father.    Social History  Social History     Tobacco Use     Smoking status: Former Smoker     Smokeless tobacco: Never Used     Tobacco comment: quit 1995   Substance Use Topics     Alcohol use: No     Drug use: No     Lives part time in [place] and part time with daughter in law who accompanied her to [facility] today .     Physical Exam  /80   Pulse 87   Ht 1.499 m (4' 11.02\")   Wt 83.1 kg (183 lb 3.2 oz)   BMI 36.98 kg/m     Body mass index is 36.98 kg/m .  GENERAL : pleasant woman  In no apparent distress  SKIN: Normal color, normal temperature, texture.  No hirsutism, alopecia or purple striae.     EYES: arcus; PER, EOMI, No scleral icterus,  No proptosis, conjunctival redness, stare, retraction  MOUTH: Moist, pink; pharynx clear  NECK: No visible masses. No palpable adenopathy, or masses. No carotid bruits. Very subtle asymmetry at best with swallow (right > left )   THYROID:  Palpable on the right, not the left; moves little with swallow.  The exam underestimates what we see on imaging.   RESP: Lungs clear to auscultation bilaterally  CARDIAC: Regular rate and rhythm, normal S1 S2, without murmurs, rubs or gallops    ABDOMEN: Normal bowel sounds; soft, nontender, no HSM or masses       NEURO: awake, alert, responds appropriately to questions.  Cranial nerves intact.  Moves all extremities; Gait normal.  No tremor of the outstretched hand.  DTRs   0/4 ,   EXTREMITIES: sock indentations.     DATA REVIEW    EXAMINATION: US THYROID, 7/1/2019 4:13 PM      COMPARISON: CT chest 6/17/2019     HISTORY: Right thyroid nodule, tracheal compression     Technique: Grayscale and color ultrasound imaging of the thyroid was  performed.     Findings:    Thyroid parenchyma: heterogenous     The right lobe of the thyroid measures: 3.6 x 3.5 x 5.7 cm      The thyroid isthmus measures: 7 mm      The left lobe of the thyroid " measures: 2.0 x 1.6 x 4.2 cm      Right lobe:  Nodule 1:  Nodule measurement: 1.2 x 1.0 x 1.3 cm  Echogenicity: Isoechoic  Consistency: solid  Calcifications: no  Hypervascular: no  Interval growth (>20%): N/A     Nodule 2:  Nodule measurement: 3.1 x 3.8 x 4.0 cm   Echogenicity: Mixed  Consistency: Predominantly solid  Calcifications: no  Hypervascular: yes  Interval growth (>20%): N/A     Isthmus: No suspicious nodules     Left Lobe:   Nodule 1:  Nodule measurement: 1.1 x 1.1 x 1.3 cm  Echogenicity: Isoechoic  Consistency: solid  Calcifications: no  Hypervascular: no  Interval growth (>20%): N/A                                                                      Impression: Bilateral thyroid nodules, the most suspicious measuring  4.0 cm in the right lobe with mildly suspicious features. Recommend  fine-needle aspiration for further evaluation.     I have personally reviewed the examination and initial interpretation  and I agree with the findings.     MARYCHUY SCOTT MD      Again, thank you for allowing me to participate in the care of your patient.      Sincerely,    Cheri Posey MD

## 2019-07-01 NOTE — PROGRESS NOTES
Endocrine Consult note    Attending Assessment/Plan :     Right thyroid mass. The mass has been present and hasn't changed a great deal with the lst images we have of it in 8/16.  It does show mass effect on the trachea including both deviation and some compression .    Thyroid US following the appt shows the mass is taller than wide.  Recommend FNAB  Information for patients with thyroid nodules given at the appt.   AddenduM: results of FNAB performed 7/8/19 benign cytology .  FNAB images confirm proper needle placement.      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 This is not critical based on the imaging dimensions.  It is not substernal.  Will pursue FNAB first and recommend inspiratory and expiratory flow loops IF the cytology is benign.  I would be surprised if this is the cause of the SOB symptoms based on what we currently know.    DM2- she in glimepiride and pioglitazone. Not addressed.    Obesity by BMI 37    Cheri Posey MD    Chief complaint:  Janay is a 78 year old female seen in consultation at the request of Dr Brandi Rai for thyroid nodule.     HISTORY OF PRESENT ILLNESS  Janay presents with her daughter in law, who is a nurse working for Dr Kay.    Recent chest CT showed a right thyroid mass, which was also noted to have been present in 2016, though they weren't told of it at the time.  The recent chest CT was performed due to symptoms of SOB. She has never before been told of goiter or thyroid disease.  There is no family history of thyroid cancer or thyroid disease.  Janay grew up in [place];  There is no known childhood irradiation    I have reviewed images on PACS  6/17/19 CT chest: right thyroid >> left  With tracheal deviation to the left, some tracheal compression . This was also seen on the 8/10/16  CT angio of the head/ neck.  The gland minimally extends substernally.  Maximum widest right lobe  is 3.5 x 4.6 x  (was 3.7 x 4.3 in 8/2016 7/1/19 US thyroid (followed appt, not discussed at appt):   Right # 1 1.2 x 1 x 1.3 hyper/isoechoic with thick halo  Right # 2  3.1 x 3.8 x 4- heterogeneous - taller than wide;   Left # 1  1.1 x 1.1 x 1.3 hyperechoic    She has known of the DM [place] . Her most recent A1c was 7.3 on 4/15/19.She is on glimepiride and pioglitazone. She was on metformin in the past, apparently self discontinued prior to 7/18/06. The notes from that time cite GI side effects.       REVIEW OF SYSTEMS  Sleep is very interrupted x years - - wakes up , not waking up SOB.  Sleep  Position is on her side; 1 pillow  Weight : stable - ; appetite OK  No tempearture nitolerance  Cardiac:negative;   Respiratory: SOB x 2 years - noted with stairs, walking fast.  No positional or head position SOB  ; sometimes WARREN with trip to bathroom; + coughing if something goes down thoat; no frequent ; + wheezing with activity;   GI:  Constipation and diarrea  Right shoulder pain -sometimes down to the right arm - all the time ; increased with lifting heavy -falls out if lifting; pain sometimes into right neck   + vertigo last 2016 -- fearful this could happen again  +headaches all the time,not now.    10 system ROS otherwise as per the HPI or negative    Past Medical History  Past Medical History:   Diagnosis Date     Arthritis      CKD (chronic kidney disease) stage 3, GFR 30-59 ml/min (H)      Diverticulitis 6/09    seen [facility]     Esophageal reflux      Essential hypertension, benign      Headache(784.0)      History of blood transfusion     1982     Murmur, cardiac     neg echo     Other and unspecified hyperlipidemia      Other chronic pain     low back     Type II or unspecified type diabetes mellitus without mention of complication, not stated as uncontrolled 1997       Past Surgical History:   Procedure Laterality Date     C NONSPECIFIC PROCEDURE  1983    ANNITA/BSO--fibroid         CATARACT IOL, RT/LT  2009    right cataract     HYSTERECTOMY  1982     LAPAROSCOPIC CHOLECYSTECTOMY N/A 6/2/2015    Procedure: LAPAROSCOPIC CHOLECYSTECTOMY;  Surgeon: Louis Ceballos MD;  Location:  OR       Medications    Current Outpatient Medications   Medication Sig Dispense Refill     Acetaminophen (TYLENOL) 325 MG CAPS Take 325-650 mg by mouth every 4 hours as needed       Ascorbic Acid (VITAMIN C CR) 1000 MG TBCR Take 1 tablet by mouth daily        aspirin 81 MG tablet Take 1 tablet (81 mg) by mouth daily       benazepril (LOTENSIN) 20 MG tablet Take 1 tablet (20 mg) by mouth daily 30 tablet 2     blood glucose (ONETOUCH ULTRA) test strip Use to test blood sugar 1 times daily or as directed. 100 strip 3     Calcium Citrate-Vitamin D (CALCIUM CITRATE + D) 315-200 MG-UNIT TABS Take 1 tablet by mouth 2 times daily.       glimepiride (AMARYL) 4 MG tablet Take 2 tablets (8 mg) by mouth every morning (before breakfast) 180 tablet 3     metoprolol succinate (TOPROL-XL) 50 MG 24 hr tablet Take 2 tablets (100 mg) by mouth daily 180 tablet 3     Multiple Vitamin (DAILY MULTIVITAMIN PO) Take 1 tablet by mouth daily.       order for DME Equipment being ordered: knee high teds 20-30 mmHg, 2 pair 2 each 1     pantoprazole (PROTONIX) 40 MG EC tablet Take 1 tablet (40 mg) by mouth daily 90 tablet 3     pioglitazone (ACTOS) 15 MG tablet Take 1 tablet (15 mg) by mouth daily 90 tablet 3     pravastatin (PRAVACHOL) 40 MG tablet Take 1 tablet (40 mg) by mouth daily 90 tablet 3     tolterodine ER (DETROL LA) 4 MG 24 hr capsule TAKE ONE CAPSULE BY MOUTH ONCE DAILY 90 capsule 3       Allergies  Allergies   Allergen Reactions     Omeprazole Rash       Family History  family history includes Arthritis in her mother; Breast Cancer in her sister; Cerebrovascular Disease in her sister; Emphysema in her sister; Prostate Cancer in her brother; Unknown/Adopted in her father.    Social History  Social History     Tobacco Use      "Smoking status: Former Smoker     Smokeless tobacco: Never Used     Tobacco comment: quit 1995   Substance Use Topics     Alcohol use: No     Drug use: No     Lives part time in [place] and part time with daughter in law who accompanied her to [facility] today .     Physical Exam  /80   Pulse 87   Ht 1.499 m (4' 11.02\")   Wt 83.1 kg (183 lb 3.2 oz)   BMI 36.98 kg/m    Body mass index is 36.98 kg/m .  GENERAL : pleasant woman  In no apparent distress  SKIN: Normal color, normal temperature, texture.  No hirsutism, alopecia or purple striae.     EYES: arcus; PER, EOMI, No scleral icterus,  No proptosis, conjunctival redness, stare, retraction  MOUTH: Moist, pink; pharynx clear  NECK: No visible masses. No palpable adenopathy, or masses. No carotid bruits. Very subtle asymmetry at best with swallow (right > left )   THYROID:  Palpable on the right, not the left; moves little with swallow.  The exam underestimates what we see on imaging.   RESP: Lungs clear to auscultation bilaterally  CARDIAC: Regular rate and rhythm, normal S1 S2, without murmurs, rubs or gallops    ABDOMEN: Normal bowel sounds; soft, nontender, no HSM or masses       NEURO: awake, alert, responds appropriately to questions.  Cranial nerves intact.  Moves all extremities; Gait normal.  No tremor of the outstretched hand.  DTRs   0/4 ,   EXTREMITIES: sock indentations.     DATA REVIEW    EXAMINATION: US THYROID, 7/1/2019 4:13 PM      COMPARISON: CT chest 6/17/2019     HISTORY: Right thyroid nodule, tracheal compression     Technique: Grayscale and color ultrasound imaging of the thyroid was  performed.     Findings:    Thyroid parenchyma: heterogenous     The right lobe of the thyroid measures: 3.6 x 3.5 x 5.7 cm      The thyroid isthmus measures: 7 mm      The left lobe of the thyroid measures: 2.0 x 1.6 x 4.2 cm      Right lobe:  Nodule 1:  Nodule measurement: 1.2 x 1.0 x 1.3 cm  Echogenicity: Isoechoic  Consistency: solid  Calcifications: " no  Hypervascular: no  Interval growth (>20%): N/A     Nodule 2:  Nodule measurement: 3.1 x 3.8 x 4.0 cm   Echogenicity: Mixed  Consistency: Predominantly solid  Calcifications: no  Hypervascular: yes  Interval growth (>20%): N/A     Isthmus: No suspicious nodules     Left Lobe:   Nodule 1:  Nodule measurement: 1.1 x 1.1 x 1.3 cm  Echogenicity: Isoechoic  Consistency: solid  Calcifications: no  Hypervascular: no  Interval growth (>20%): N/A                                                                      Impression: Bilateral thyroid nodules, the most suspicious measuring  4.0 cm in the right lobe with mildly suspicious features. Recommend  fine-needle aspiration for further evaluation.     I have personally reviewed the examination and initial interpretation  and I agree with the findings.     MARYCHUY SCOTT MD    Copath Report 07/08/2019  9:40 AM 88   Patient Name: FRANCES LACEY   MR#: [id number]   Specimen #: [id number]   Collected: 7/8/2019   Received: 7/8/2019   Reported: 7/9/2019 18:17   Ordering Phy(s): MAIA HARDEN   Additional Phy(s): EUSEBIA TERRY     For improved result formatting, select 'View Enhanced Report Format' under    Linked Documents section.     SPECIMEN/STAIN PROCESS:   Thyroid, right, ultrasound guided fine needle aspiration        Pap-Cyto x 5, Vieira's stain-cyto x 5     ----------------------------------------------------------------     CYTOLOGIC INTERPRETATION:     Thyroid gland, right lobe nodule, ultrasound-guided fine needle   aspiration:   Benign.   Consistent with a benign nodule (includes adenomatoid nodule, colloid   nodule, etc.).     The [place] implied risk of malignancy and recommended clinical   management:   Benign has a 0-3% risk of malignancy, recommended management is clinical   follow-up.     Specimen Adequacy: Satisfactory for evaluation.     I have personally reviewed all specimens and/or slides, including the   listed special stains, and used them    with my medical judgement to determine or confirm the final diagnosis.     Electronically signed out by:     Cassandra Acosta M.D.     CLINICAL HISTORY:   Abnormal imaging.     ,     GROSS:   Thyroid, right, ultrasound guided fine needle aspiration:  Received are 5   fixed slides, processed for Pap   stain, and 5 air dried slides, processed for Vieira stain. Thyroid panel   tube sent directly to the [facility]   [facility] Lab for future studies.     MICROSCOPIC:   The smears are mildly cellular with modest amount of colloid.  Both   macrofollicles and microfollicles are   present without a predominant microfollicular pattern.  The follicular   cells exhibit bland and uniform   cytologic features.  Diagnostic nuclear features of papillary carcinoma   are not identified.     CPT Codes:   A: 11012-ZMAJ, 06631-ZANYGJW     COLLECTION SITE:   Client:  [facility]   Location:  [facility] ()     The technical component of this testing was completed at the [facility], with the professional component performed    at the [facility]   [facility]atory, [address], [place] [address]   ([phone])        PROCEDURE(S):  Ultrasound-guided Thyroid FNA/biopsy     DATE OF PROCEDURE:   7/8/2019 10:01 AM     OPERATORS:    José Miguel Caldwell MD     MEDICATIONS:    1% Lidocaine SQ     CONTRAST:   None     REFERENCED AIR KERMA: 0 mGy  FLUOROSCOPY TIME: 0 minutes     ESTIMATED BLOOD LOSS:   Minimal     COMPLICATIONS:   None     PRE-PROCEDURE DIAGNOSIS: Thyroid nodule  POST-PROCEDURE DIAGNOSIS: Same     CLINICAL HISTORY/INDICATION:   4 cm thyroid nodule; Right thyroid nodule     PROCEDURE AND FINDINGS:  Following a discussion of the risks, benefits, indications, and  alternatives to treatment, appropriate informed consent was obtained  from the patient.  The patient was then brought to the Ultrasound  suite and placed supine on the table. The  right  neck was prepped and  draped in the usual sterile fashion.  A timeout was performed per  hospital universal protocol policy to confirm the correct patient,  site and procedure to be performed.     Preliminary ultrasound of the thyroid was performed to assess for  appropriate sites for access.  These images identify the  large  heterogeneous right thyroid nodule correlating with prior ultrasound  and an ultrasound image was archived. An appropriate site for skin  access was identified and the overlying soft tissues were infiltrated  with 1% Lidocaine for local anesthesia. Under direct ultrasound  visualization a 25-gauge needle was advanced into the target nodule  and cells were obtained.  This was repeated a total of 4 times. The  biopsy specimen were submitted to pathology for analysis. A sterile  dressing was applied.      Throughout the procedure, the patient was monitored by a radiology  nurse for cardiac rhythm, blood pressure and oxygen saturation which  remained stable. The patient tolerated the procedure well and left  interventional radiology in stable condition.                                                                      IMPRESSION:  Ultrasound-guided targeted thyroid fine needle aspiration/biopsy, as  described     MAIA HARDEN MD

## 2019-07-01 NOTE — PATIENT INSTRUCTIONS
Labs today  Thyroid ultrasound    INFORMATION FOR PATIENTS  THYROID NODULES AND   FINE NEEDLE ASPIRATION BIOPSY OF THE THYROID    The finding of a thyroid nodule is almost never an emergency.  Thyroid nodules are common, occurring in up to 50% of patients over the age of 50 years.      Innocent (not important enough to have been detected during life) thyroid cancer may be found in around 5% of people.   Likewise, about 5-15% of patients with demonstrated thyroid nodules will prove to have thyroid cancer if subjected to aggressive diagnostic measures.  As a rule, thyroid cancer has an excellent prognosis.  Therefore, in each patient with thyroid nodules, the decision has to be made about how important it is to identify and treat a cancer, if present.      When we find nodules on the thyroid we use ultrasound and either palpation or ultrasound guided biopsy to help determine which patients, from the large number with nodules on the thyroid, are in the group containing an important thyroid cancer.      Ultrasound Guided Fine Needle Aspiration Biopsy of the Thyroid uses the ultrasound eye to see the nodule and the needle during the biopsy procedure.  This procedure is performed in the radiology department.  The radiologist uses a small needle to remove cells from the specific area of the thyroid. The cells are then analyzed under the microscope to determine whether or not they might be cancer.      The results of thyroid biopsy come in 4 categories    1. Benign or negative for cancer.  This is most common result, found in approximately 60-70% of biopsy specimens.  There remains a < 6 % chance that this result is wrong.    2. Positive for cancer.  This is found about 5 % of the time. There remains a  < 1% chance that cancer is not present when we make this diagnosis by biopsy. This result leads to a recommendation for surgery to make the final diagnosis of cancer.     3. Indeterminate, or the grey zone.  This is found in  approximately 20-30% of patients.  This diagnosis identifies a higher risk group, often resulting in diagnostic surgery to establish the final diagnosis.  If all patients in this group go to surgery, only 10-30%, on average, prove to have cancer.  This group is subdivided into 3 subcategories: atypia of undetermined significance, follicular neoplasm and suspicious, with increasing risk.      4. Insufficient for diagnosis. This is found in 5-10% of biopsies. When this occurs we need to repeat the biopsy.      The greatest risk of thyroid biopsy is that the result is not clear (that it is in the indeterminate grey zone group), resulting in future thyroid surgery for what is likely to be benign thyroid disease.  There is a small risk of bleeding or infection.      If your doctor has recommended you have an ultrasound guided fine needle aspiration biopsy of the thyroid, your appointment may be scheduled by calling [phone].  Be sure to specify that the procedure is to be ultrasound-guided and that it is to be scheduled in radiology

## 2019-07-02 ENCOUNTER — HOSPITAL ENCOUNTER (OUTPATIENT)
Facility: CLINIC | Age: 79
End: 2019-07-02
Payer: COMMERCIAL

## 2019-07-02 LAB
THYROGLOB AB SERPL IA-ACNC: <20 IU/ML (ref 0–40)
THYROPEROXIDASE AB SERPL-ACNC: <10 IU/ML

## 2019-07-03 PROBLEM — R79.89 HIGH SERUM THYROID STIMULATING HORMONE (TSH): Status: ACTIVE | Noted: 2019-07-03

## 2019-07-03 PROBLEM — J39.8 TRACHEAL COMPRESSION: Status: ACTIVE | Noted: 2019-07-03

## 2019-07-03 PROBLEM — E04.1 RIGHT THYROID NODULE: Status: ACTIVE | Noted: 2019-07-03

## 2019-07-03 LAB — CALCIT SERPL-MCNC: <2 PG/ML (ref 0–5.1)

## 2019-07-03 RX ORDER — DEXTROSE MONOHYDRATE 25 G/50ML
25-50 INJECTION, SOLUTION INTRAVENOUS
Status: CANCELLED | OUTPATIENT
Start: 2019-07-03

## 2019-07-03 RX ORDER — NICOTINE POLACRILEX 4 MG
15-30 LOZENGE BUCCAL
Status: CANCELLED | OUTPATIENT
Start: 2019-07-03

## 2019-07-08 ENCOUNTER — HOSPITAL ENCOUNTER (OUTPATIENT)
Dept: ULTRASOUND IMAGING | Facility: CLINIC | Age: 79
Discharge: HOME | End: 2019-07-08
Payer: COMMERCIAL

## 2019-07-08 DIAGNOSIS — E04.1 RIGHT THYROID NODULE: ICD-10-CM

## 2019-07-08 PROCEDURE — 88173 CYTOPATH EVAL FNA REPORT: CPT | Mod: 26 | Performed by: RADIOLOGY

## 2019-07-08 PROCEDURE — 00000102 ZZHCL STATISTIC CYTO WRIGHT STAIN TC: Performed by: RADIOLOGY

## 2019-07-08 PROCEDURE — 88173 CYTOPATH EVAL FNA REPORT: CPT | Performed by: RADIOLOGY

## 2019-07-08 PROCEDURE — 10005 FNA BX W/US GDN 1ST LES: CPT

## 2019-07-08 PROCEDURE — 25000125 ZZHC RX 250: Performed by: RADIOLOGY

## 2019-07-08 RX ORDER — NICOTINE POLACRILEX 4 MG
15-30 LOZENGE BUCCAL
Status: DISCONTINUED | OUTPATIENT
Start: 2019-07-08 | End: 2019-07-09 | Stop reason: HOSPADM

## 2019-07-08 RX ORDER — DEXTROSE MONOHYDRATE 25 G/50ML
25-50 INJECTION, SOLUTION INTRAVENOUS
Status: DISCONTINUED | OUTPATIENT
Start: 2019-07-08 | End: 2019-07-09 | Stop reason: HOSPADM

## 2019-07-08 RX ADMIN — LIDOCAINE HYDROCHLORIDE 10 ML: 10 INJECTION, SOLUTION EPIDURAL; INFILTRATION; INTRACAUDAL; PERINEURAL at 09:38

## 2019-07-08 NOTE — PROGRESS NOTES
Patient tolerated thyroid nodule by Dr. Caldwell well.  Slides obtained and sent to lab.  Dressing clean, dry and intact with no drainage.  Patient states understanding discharge instructions,  per daughter in law.  Trina Acuña, RN, BSN

## 2019-07-08 NOTE — PROCEDURES
RADIOLOGY POST PROCEDURE NOTE    Patient name: Janay Kyle  MRN: [id number]  : 1940    Pre-procedure diagnosis: thyroid nodule  Post-procedure diagnosis: Same    Procedure Date/Time: 2019  10:29 AM  Procedure: US guided right thyroid FNA  Estimated blood loss: None  Specimen(s) collected with description: Right thyroid nodule    The patient tolerated the procedure well with no immediate complications.  Significant findings:none    See imaging dictation for procedural details.    Provider name: José Miguel Caldwell  Assistant(s):None

## 2019-07-08 NOTE — DISCHARGE INSTRUCTIONS
Thyroid/Lymph Node Biopsy Discharge Instructions     After you go home:      You may resume your normal diet    Care of Puncture Site:      You may have mild bruising, soreness & swelling at the puncture site. This will go away in a few days    For swelling & bruising, you may use an ice pack on the site.     Activity:      You may go back to your normal activity    Avoid strenuous activity for 24 hours    Medicines:      You may resume all medications    For minor pain, you may take Acetaminophen (Tylenol) or Ibuprofen (Advil)            Call the provider who ordered this test if:      Increased redness or swelling at the site    Fluid or blood oozing from the site    Severe pain at the site    Chills or a fever greater than 101 F (38 C).    Any questions or concerns    Call  [phone] or go to the Emergency Room if:      Trouble breathing    Your neck swells    Bleeding that you cannot control    Severe difficulty swallowing    If you have questions call:      [facility] Radiology Dept @ [phone]    The provider who performed your procedure was _________________.

## 2019-07-09 DIAGNOSIS — E04.1 RIGHT THYROID NODULE: Primary | ICD-10-CM

## 2019-07-09 DIAGNOSIS — J39.8 TRACHEAL COMPRESSION: ICD-10-CM

## 2019-07-09 LAB — COPATH REPORT: NORMAL

## 2019-07-17 DIAGNOSIS — I10 ESSENTIAL HYPERTENSION, BENIGN: ICD-10-CM

## 2019-07-18 RX ORDER — BENAZEPRIL HYDROCHLORIDE 20 MG/1
TABLET ORAL
Qty: 30 TABLET | Refills: 2 | Status: SHIPPED | OUTPATIENT
Start: 2019-07-18 | End: 2019-10-14

## 2019-07-18 NOTE — TELEPHONE ENCOUNTER
"Requested Prescriptions   Pending Prescriptions Disp Refills     benazepril (LOTENSIN) 20 MG tablet [Pharmacy Med Name: BENAZEPRIL 20MG TABLETS] 30 tablet 0     Sig: TAKE 1 TABLET(20 MG) BY MOUTH DAILY   Last Written Prescription Date:  04/15/2019  Last Fill Quantity: 30,  # refills: 02   Last office visit: 4/15/2019 with prescribing provider:     Future Office Visit:      ACE Inhibitors (Including Combos) Protocol Failed - 7/17/2019  8:54 PM        Failed - Blood pressure under 140/90 in past 12 months     BP Readings from Last 3 Encounters:   07/01/19 170/80   04/15/19 138/82   10/08/18 118/72                 Passed - Recent (12 mo) or future (30 days) visit within the authorizing provider's specialty     Patient had office visit in the last 12 months or has a visit in the next 30 days with authorizing provider or within the authorizing provider's specialty.  See \"Patient Info\" tab in inbasket, or \"Choose Columns\" in Meds & Orders section of the refill encounter.              Passed - Medication is active on med list        Passed - Patient is age 18 or older        Passed - No active pregnancy on record        Passed - Normal serum creatinine on file in past 12 months     Recent Labs   Lab Test 04/15/19  0824   CR 0.97             Passed - Normal serum potassium on file in past 12 months     Recent Labs   Lab Test 04/15/19  0824   POTASSIUM 4.2             Passed - No positive pregnancy test within past 12 months        "

## 2019-07-18 NOTE — TELEPHONE ENCOUNTER
Routing refill request to provider for review/approval because:  Recent blood pressure ouside standing order parameters.    Please advise, thanks.

## 2019-07-29 DIAGNOSIS — J39.8 TRACHEAL COMPRESSION: Primary | ICD-10-CM

## 2019-08-09 LAB
DLCOUNC-%PRED-PRE: 101 %
DLCOUNC-PRE: 16.15 ML/MIN/MMHG
DLCOUNC-PRED: 15.91 ML/MIN/MMHG
ERV-%PRED-PRE: 401 %
ERV-PRE: 0.2 L
ERV-PRED: 0.05 L
EXPTIME-PRE: 7.12 SEC
FEF2575-%PRED-PRE: 100 %
FEF2575-PRE: 1.4 L/SEC
FEF2575-PRED: 1.39 L/SEC
FEFMAX-%PRED-PRE: 83 %
FEFMAX-PRE: 3.63 L/SEC
FEFMAX-PRED: 4.33 L/SEC
FEV1-%PRED-PRE: 87 %
FEV1-PRE: 1.39 L
FEV1FEV6-PRE: 84 %
FEV1FEV6-PRED: 78 %
FEV1FVC-PRE: 84 %
FEV1FVC-PRED: 79 %
FEV1SVC-PRE: 77 %
FEV1SVC-PRED: 69 %
FIFMAX-PRE: 2.44 L/SEC
FRCPLETH-%PRED-PRE: 95 %
FRCPLETH-PRE: 2.33 L
FRCPLETH-PRED: 2.43 L
FVC-%PRED-PRE: 81 %
FVC-PRE: 1.65 L
FVC-PRED: 2.01 L
IC-%PRED-PRE: 72 %
IC-PRE: 1.61 L
IC-PRED: 2.23 L
RVPLETH-%PRED-PRE: 109 %
RVPLETH-PRE: 2.13 L
RVPLETH-PRED: 1.94 L
TLCPLETH-%PRED-PRE: 96 %
TLCPLETH-PRE: 3.94 L
TLCPLETH-PRED: 4.1 L
VA-%PRED-PRE: 75 %
VA-PRE: 2.83 L
VC-%PRED-PRE: 79 %
VC-PRE: 1.81 L
VC-PRED: 2.28 L

## 2019-08-16 DIAGNOSIS — E04.1 RIGHT THYROID NODULE: Primary | ICD-10-CM

## 2019-08-16 DIAGNOSIS — J39.8 TRACHEAL COMPRESSION: ICD-10-CM

## 2019-10-11 DIAGNOSIS — N18.30 TYPE 2 DIABETES MELLITUS WITH STAGE 3 CHRONIC KIDNEY DISEASE, WITHOUT LONG-TERM CURRENT USE OF INSULIN (H): ICD-10-CM

## 2019-10-11 DIAGNOSIS — I10 ESSENTIAL HYPERTENSION, BENIGN: ICD-10-CM

## 2019-10-11 DIAGNOSIS — N18.30 CKD (CHRONIC KIDNEY DISEASE) STAGE 3, GFR 30-59 ML/MIN (H): ICD-10-CM

## 2019-10-11 DIAGNOSIS — E11.22 TYPE 2 DIABETES MELLITUS WITH STAGE 3 CHRONIC KIDNEY DISEASE, WITHOUT LONG-TERM CURRENT USE OF INSULIN (H): ICD-10-CM

## 2019-10-11 LAB
ANION GAP SERPL CALCULATED.3IONS-SCNC: 6 MMOL/L (ref 3–14)
BUN SERPL-MCNC: 18 MG/DL (ref 7–30)
CALCIUM SERPL-MCNC: 9.3 MG/DL (ref 8.5–10.1)
CHLORIDE SERPL-SCNC: 107 MMOL/L (ref 94–109)
CO2 SERPL-SCNC: 27 MMOL/L (ref 20–32)
CREAT SERPL-MCNC: 0.91 MG/DL (ref 0.52–1.04)
CREAT UR-MCNC: 104 MG/DL
GFR SERPL CREATININE-BSD FRML MDRD: 61 ML/MIN/{1.73_M2}
GLUCOSE SERPL-MCNC: 132 MG/DL (ref 70–99)
HBA1C MFR BLD: 7.4 % (ref 0–5.6)
MICROALBUMIN UR-MCNC: 30 MG/L
MICROALBUMIN/CREAT UR: 28.85 MG/G CR (ref 0–25)
POTASSIUM SERPL-SCNC: 4.1 MMOL/L (ref 3.4–5.3)
SODIUM SERPL-SCNC: 139 MMOL/L (ref 133–144)

## 2019-10-14 ENCOUNTER — OFFICE VISIT (OUTPATIENT)
Dept: INTERNAL MEDICINE | Facility: CLINIC | Age: 79
End: 2019-10-14
Payer: COMMERCIAL

## 2019-10-14 ENCOUNTER — TRANSFERRED RECORDS (OUTPATIENT)
Dept: HEALTH INFORMATION MANAGEMENT | Facility: CLINIC | Age: 79
End: 2019-10-14

## 2019-10-14 VITALS
TEMPERATURE: 98.3 F | BODY MASS INDEX: 36.89 KG/M2 | OXYGEN SATURATION: 97 % | SYSTOLIC BLOOD PRESSURE: 142 MMHG | DIASTOLIC BLOOD PRESSURE: 96 MMHG | RESPIRATION RATE: 16 BRPM | HEIGHT: 59 IN | WEIGHT: 183 LBS | HEART RATE: 95 BPM

## 2019-10-14 DIAGNOSIS — E11.22 TYPE 2 DIABETES MELLITUS WITH STAGE 3 CHRONIC KIDNEY DISEASE, WITHOUT LONG-TERM CURRENT USE OF INSULIN (H): Primary | ICD-10-CM

## 2019-10-14 DIAGNOSIS — N18.30 TYPE 2 DIABETES MELLITUS WITH STAGE 3 CHRONIC KIDNEY DISEASE, WITHOUT LONG-TERM CURRENT USE OF INSULIN (H): Primary | ICD-10-CM

## 2019-10-14 DIAGNOSIS — Z23 NEED FOR PROPHYLACTIC VACCINATION AND INOCULATION AGAINST INFLUENZA: ICD-10-CM

## 2019-10-14 DIAGNOSIS — E04.1 RIGHT THYROID NODULE: ICD-10-CM

## 2019-10-14 DIAGNOSIS — K21.9 GASTROESOPHAGEAL REFLUX DISEASE WITHOUT ESOPHAGITIS: ICD-10-CM

## 2019-10-14 DIAGNOSIS — M54.2 NECK PAIN: ICD-10-CM

## 2019-10-14 DIAGNOSIS — N18.30 CKD (CHRONIC KIDNEY DISEASE) STAGE 3, GFR 30-59 ML/MIN (H): ICD-10-CM

## 2019-10-14 DIAGNOSIS — E66.01 MORBID OBESITY (H): ICD-10-CM

## 2019-10-14 DIAGNOSIS — I10 ESSENTIAL HYPERTENSION, BENIGN: ICD-10-CM

## 2019-10-14 DIAGNOSIS — E78.5 HYPERLIPIDEMIA LDL GOAL <100: ICD-10-CM

## 2019-10-14 DIAGNOSIS — R35.0 URINARY FREQUENCY: ICD-10-CM

## 2019-10-14 PROBLEM — R79.89 HIGH SERUM THYROID STIMULATING HORMONE (TSH): Status: RESOLVED | Noted: 2019-07-03 | Resolved: 2019-10-14

## 2019-10-14 PROCEDURE — G0008 ADMIN INFLUENZA VIRUS VAC: HCPCS | Performed by: INTERNAL MEDICINE

## 2019-10-14 PROCEDURE — 90662 IIV NO PRSV INCREASED AG IM: CPT | Performed by: INTERNAL MEDICINE

## 2019-10-14 PROCEDURE — 99207 C PAF COMPLETED  NO CHARGE: CPT | Mod: 25 | Performed by: INTERNAL MEDICINE

## 2019-10-14 PROCEDURE — 99214 OFFICE O/P EST MOD 30 MIN: CPT | Mod: 25 | Performed by: INTERNAL MEDICINE

## 2019-10-14 RX ORDER — BENAZEPRIL HYDROCHLORIDE 20 MG/1
20 TABLET ORAL DAILY
Qty: 90 TABLET | Refills: 3 | Status: CANCELLED | OUTPATIENT
Start: 2019-10-14

## 2019-10-14 RX ORDER — BENAZEPRIL/HYDROCHLOROTHIAZIDE 20 MG-25MG
1 TABLET ORAL DAILY
Qty: 90 TABLET | Refills: 3 | Status: SHIPPED | OUTPATIENT
Start: 2019-10-14 | End: 2020-12-01

## 2019-10-14 RX ORDER — PANTOPRAZOLE SODIUM 40 MG/1
40 TABLET, DELAYED RELEASE ORAL DAILY
Qty: 90 TABLET | Refills: 3 | Status: SHIPPED | OUTPATIENT
Start: 2019-10-14 | End: 2020-12-01

## 2019-10-14 RX ORDER — PIOGLITAZONEHYDROCHLORIDE 15 MG/1
15 TABLET ORAL DAILY
Qty: 90 TABLET | Refills: 3 | Status: CANCELLED | OUTPATIENT
Start: 2019-10-14

## 2019-10-14 RX ORDER — PIOGLITAZONEHYDROCHLORIDE 30 MG/1
30 TABLET ORAL DAILY
Qty: 90 TABLET | Refills: 3 | Status: SHIPPED | OUTPATIENT
Start: 2019-10-14 | End: 2020-12-01

## 2019-10-14 RX ORDER — PRAVASTATIN SODIUM 40 MG
40 TABLET ORAL DAILY
Qty: 90 TABLET | Refills: 3 | Status: SHIPPED | OUTPATIENT
Start: 2019-10-14 | End: 2020-12-01

## 2019-10-14 RX ORDER — METOPROLOL SUCCINATE 50 MG/1
100 TABLET, EXTENDED RELEASE ORAL DAILY
Qty: 180 TABLET | Refills: 3 | Status: SHIPPED | OUTPATIENT
Start: 2019-10-14 | End: 2020-12-01

## 2019-10-14 RX ORDER — GLIMEPIRIDE 4 MG/1
8 TABLET ORAL
Qty: 180 TABLET | Refills: 3 | Status: SHIPPED | OUTPATIENT
Start: 2019-10-14 | End: 2020-12-01

## 2019-10-14 RX ORDER — TOLTERODINE 4 MG/1
CAPSULE, EXTENDED RELEASE ORAL
Qty: 90 CAPSULE | Refills: 3 | Status: SHIPPED | OUTPATIENT
Start: 2019-10-14 | End: 2020-12-01

## 2019-10-14 ASSESSMENT — MIFFLIN-ST. JEOR: SCORE: 1220.71

## 2019-10-14 NOTE — NURSING NOTE
"Vital signs:  Temp: 98.3  F (36.8  C) Temp src: Oral BP: (!) 142/96 Pulse: 95   Resp: 16 SpO2: 97 %     Height: 149.9 cm (4' 11\") Weight: 83 kg (183 lb)  Estimated body mass index is 36.96 kg/m  as calculated from the following:    Height as of this encounter: 1.499 m (4' 11\").    Weight as of this encounter: 83 kg (183 lb).        "

## 2019-10-14 NOTE — PROGRESS NOTES
Subjective     Janay Kyle is a 78 year old female who presents to clinic today for the following health issues:    HPI   Diabetes Follow-up      How often are you checking your blood sugar? One time daily    What time of day are you checking your blood sugars (select all that apply)?  Before meals    Have you had any blood sugars above 200?  No    Have you had any blood sugars below 70?  No    What symptoms do you notice when your blood sugar is low?  Weak and Other: sweaty     What concerns do you have today about your diabetes? Other: swelling bilateral feet     Do you have any of these symptoms? (Select all that apply)  Numbness in feet and Burning in feet     Have you had a diabetic eye exam in the last 12 months? Yes- Date of last eye exam: 10/14/2019 [facility].     BP Readings from Last 2 Encounters:   07/01/19 170/80   04/15/19 138/82     Hemoglobin A1C (%)   Date Value   10/11/2019 7.4 (H)   04/15/2019 7.3 (H)     LDL Cholesterol Calculated (mg/dL)   Date Value   04/15/2019 85   04/20/2018 62       Diabetes Management Resources  Hyperlipidemia Follow-Up      Are you having any of the following symptoms? (Select all that apply)  No complaints of shortness of breath, chest pain or pressure.  No increased sweating or nausea with activity.  No left-sided neck or arm pain.  No complaints of pain in calves when walking 1-2 blocks.    Are you regularly taking any medication or supplement to lower your cholesterol?   Yes- Pravastatin    Are you having muscle aches or other side effects that you think could be caused by your cholesterol lowering medication?  No      Hypertension Follow-up      Do you check your blood pressure regularly outside of the clinic? Yes     Are you following a low salt diet? No    Are your blood pressures ever more than 140 on the top number (systolic) OR more   than 90 on the bottom number (diastolic), for example 140/90? Yes      How many servings of fruits and  vegetables do you eat daily?  4 or more    On average, how many sweetened beverages do you drink each day (soda, juice, sweet tea, etc)?   2    How many days per week do you miss taking your medication? 0      Other problems:   1.  Edema: She has not been wearing the support stockings, it improved a little bit after stopping Norvasc but not gone.  She is going to [place] next month for the winter and is a little concerned about more edema when she is there.  2.  CKD: Slight increase in urine protein but improvement in creatinine and GFR.  3.  Thyroid nodule: She had a negative biopsy, there are concerns about tracheal compression but pulmonary function test suggested this was not clinically significant.  No change in breathing, there is some slight noise when she exerts herself but she is not really uncomfortable with it.  4.  Morbid obesity: Weight is stable    Current concerns:   Right ear pain: She had mentioned this in the past and her ear was clear, felt it was related to her neck and upper back muscle soreness which she has had for a while.  It is bothering her on and off.  She would like to have it checked again.  She is not doing any regular exercises.    Patient Active Problem List   Diagnosis     Essential hypertension, benign     Esophageal reflux     HYPERLIPIDEMIA LDL GOAL <100     CKD (chronic kidney disease) stage 3, GFR 30-59 ml/min (H)     Benign paroxysmal positional vertigo     Morbid obesity (H)     Type 2 diabetes mellitus with stage 3 chronic kidney disease, without long-term current use of insulin (H)     Tracheal compression     Right thyroid nodule       Current Outpatient Medications   Medication Sig Dispense Refill     Acetaminophen (TYLENOL) 325 MG CAPS Take 325-650 mg by mouth every 4 hours as needed       Ascorbic Acid (VITAMIN C CR) 1000 MG TBCR Take 1 tablet by mouth daily        aspirin 81 MG tablet Take 1 tablet (81 mg) by mouth daily       benazepril (LOTENSIN) 20 MG tablet TAKE 1  "TABLET(20 MG) BY MOUTH DAILY 30 tablet 2     Calcium Citrate-Vitamin D (CALCIUM CITRATE + D) 315-200 MG-UNIT TABS Take 1 tablet by mouth 2 times daily.       glimepiride (AMARYL) 4 MG tablet Take 2 tablets (8 mg) by mouth every morning (before breakfast) 180 tablet 3     metoprolol succinate (TOPROL-XL) 50 MG 24 hr tablet Take 2 tablets (100 mg) by mouth daily 180 tablet 3     Multiple Vitamin (DAILY MULTIVITAMIN PO) Take 1 tablet by mouth daily.       pantoprazole (PROTONIX) 40 MG EC tablet Take 1 tablet (40 mg) by mouth daily 90 tablet 3     pioglitazone (ACTOS) 15 MG tablet Take 1 tablet (15 mg) by mouth daily 90 tablet 3     pravastatin (PRAVACHOL) 40 MG tablet Take 1 tablet (40 mg) by mouth daily 90 tablet 3     tolterodine ER (DETROL LA) 4 MG 24 hr capsule TAKE ONE CAPSULE BY MOUTH ONCE DAILY 90 capsule 3       Social History     Tobacco Use     Smoking status: Former Smoker     Smokeless tobacco: Never Used     Tobacco comment: quit 1995   Substance Use Topics     Alcohol use: No     Drug use: No        ROS:  General: no fever, chills  Weight: stable  Vision:negative. Last eye exam 1 year, scheduled today.  ENT: negative  Respiratory negative.  Cardiac: no chest pain or pressure  Abdominal: no nausea, vomiting, abdominal pain, bowel changes  Vascular no complaints of claudication  Neurologic:no complaints of neuropathy  Feet no lesions, in grown nails, edema   : no polyuria, hematuria, dysuria    Objective:  Patient alert in NAD  BP (!) 142/96   Pulse 95   Temp 98.3  F (36.8  C) (Oral)   Resp 16   Ht 1.499 m (4' 11\")   Wt 83 kg (183 lb)   SpO2 97%   BMI 36.96 kg/m         Wt Readings from Last 4 Encounters:   10/14/19 83 kg (183 lb)   07/01/19 83.1 kg (183 lb 3.2 oz)   04/15/19 83.9 kg (185 lb)   10/08/18 85.1 kg (187 lb 9.6 oz)       CV: CV: normal S1, S2 without murmur, S3 or S4.  Carotid pulses: full  LUNGS: clear  No stridor  Right ear: Canal and TM are normal  Anterior neck with enlarged thyroid " nodule on the right  Moderate tenderness of the muscles of the right upper neck    Lab Results   Component Value Date    A1C 7.4 10/11/2019    A1C 7.3 04/15/2019    A1C 7.8 10/06/2018    A1C 7.0 04/20/2018    A1C 7.3 10/05/2017       Lab Results   Component Value Date    CHOL 160 04/15/2019    HDL 55 04/15/2019    LDL 85 04/15/2019    TRIG 100 04/15/2019    CHOLHDLRATIO 2.3 03/24/2015        Assessment & Plan     1. Type 2 diabetes mellitus with stage 3 chronic kidney disease, without long-term current use of insulin (H)  Though not optimally controlled, increase Actos to 30 mg, recheck 6 months  - glimepiride (AMARYL) 4 MG tablet; Take 2 tablets (8 mg) by mouth every morning (before breakfast)  Dispense: 180 tablet; Refill: 3  - pioglitazone (ACTOS) 30 MG tablet; Take 1 tablet (30 mg) by mouth daily  Dispense: 90 tablet; Refill: 3    2. CKD (chronic kidney disease) stage 3, GFR 30-59 ml/min (H)  Improved creatinine and GFR but slight increase protein, continue benazepril, work on control blood pressure and sugars and recheck in a year    3. Morbid obesity (H)  Weight is stable, encouraged to work on diet and exercise    4. BENIGN HYPERTENSION  Blood pressure is high, add hydrochlorothiazide to help edema and a blood pressure  - metoprolol succinate ER (TOPROL-XL) 50 MG 24 hr tablet; Take 2 tablets (100 mg) by mouth daily  Dispense: 180 tablet; Refill: 3  - benazepril-hydrochlorothiazide (LOTENSIN HCT) 20-25 MG tablet; Take 1 tablet by mouth daily  Dispense: 90 tablet; Refill: 3    5. Right thyroid nodule  Plan is to monitor with a repeat scan next spring, follow-up with endocrinology    6. Hyperlipidemia LDL goal <100  Controlled, continue medication  - pravastatin (PRAVACHOL) 40 MG tablet; Take 1 tablet (40 mg) by mouth daily  Dispense: 90 tablet; Refill: 3    7. Neck pain  Advised this probably is the cause of her ear pain, work on heat, stretches.  Can refer to physical therapy when she returns in the spring if  "it is continuing to bother    8. Urinary frequency  Continue medication  - tolterodine ER (DETROL LA) 4 MG 24 hr capsule; TAKE ONE CAPSULE BY MOUTH ONCE DAILY  Dispense: 90 capsule; Refill: 3    9. Gastroesophageal reflux disease without esophagitis  Continue medication  - pantoprazole (PROTONIX) 40 MG EC tablet; Take 1 tablet (40 mg) by mouth daily  Dispense: 90 tablet; Refill: 3    10. Need for prophylactic vaccination and inoculation against influenza    - INFLUENZA (HIGH DOSE) 3 VALENT VACCINE [90220]  - ADMIN INFLUENZA (For MEDICARE Patients ONLY) []     BMI:   Estimated body mass index is 36.96 kg/m  as calculated from the following:    Height as of this encounter: 1.499 m (4' 11\").    Weight as of this encounter: 83 kg (183 lb).   Weight management plan: Discussed healthy diet and exercise guidelines            Return in about 6 months (around 4/14/2020) for Lab Work, Diabetes, see me a week after lab.    Brandi Rai MD  [facility]        "

## 2019-11-03 ENCOUNTER — HEALTH MAINTENANCE LETTER (OUTPATIENT)
Age: 79
End: 2019-11-03

## 2020-02-10 ENCOUNTER — HEALTH MAINTENANCE LETTER (OUTPATIENT)
Age: 80
End: 2020-02-10

## 2020-10-29 ENCOUNTER — TELEPHONE (OUTPATIENT)
Dept: ENDOCRINOLOGY | Facility: CLINIC | Age: 80
End: 2020-10-29

## 2020-10-29 ENCOUNTER — MYC MEDICAL ADVICE (OUTPATIENT)
Dept: ENDOCRINOLOGY | Facility: CLINIC | Age: 80
End: 2020-10-29

## 2020-10-29 ENCOUNTER — MYC MEDICAL ADVICE (OUTPATIENT)
Dept: INTERNAL MEDICINE | Facility: CLINIC | Age: 80
End: 2020-10-29

## 2020-10-29 DIAGNOSIS — E04.1 RIGHT THYROID NODULE: ICD-10-CM

## 2020-10-29 DIAGNOSIS — N18.30 STAGE 3 CHRONIC KIDNEY DISEASE, UNSPECIFIED WHETHER STAGE 3A OR 3B CKD (H): Primary | ICD-10-CM

## 2020-10-29 DIAGNOSIS — E11.22 TYPE 2 DIABETES MELLITUS WITH STAGE 3 CHRONIC KIDNEY DISEASE, WITHOUT LONG-TERM CURRENT USE OF INSULIN, UNSPECIFIED WHETHER STAGE 3A OR 3B CKD (H): ICD-10-CM

## 2020-10-29 DIAGNOSIS — N18.30 TYPE 2 DIABETES MELLITUS WITH STAGE 3 CHRONIC KIDNEY DISEASE, WITHOUT LONG-TERM CURRENT USE OF INSULIN, UNSPECIFIED WHETHER STAGE 3A OR 3B CKD (H): ICD-10-CM

## 2020-10-29 NOTE — TELEPHONE ENCOUNTER
[facility] Call Center    Phone Message    May a detailed message be left on voicemail: yes     Reason for Call: Order(s): Other:   Reason for requested: Pt s daughter in law, Sue Edwards, stated that the Pt is overdue for her CT scan due to being out of the country because of covid. Pt is going to be returning to [place] 11/15/20, and is needing new orders for a CT scan. Please place new orders, and call Sue once orders are created.  Date needed: asap  Provider name: Taty      Action Taken: Message routed to:  [facility] endo    Travel Screening: Not Applicable

## 2020-10-29 NOTE — TELEPHONE ENCOUNTER
Please see daughter in law's mychart message below.    Asking for lab orders to be placed in chart prior to coming in for appointment (working on scheduling appointment).    Last office visit 10-14-19    Please advise, thanks.

## 2020-10-29 NOTE — TELEPHONE ENCOUNTER
Orders extended. Provider notified.    Sent to [facility] to schedule follow up visit.   Viky De Anda RN on 10/29/2020 at 9:34 AM     RE    Cheri Posey MD  to Janay Kyle          12:27 PM  I placed order for 6/19/2020 or thereafter which is one year after the last one.  If you call [phone] they can help you schedule it.  You should schedlue follow up appt with me for sometime after the study.  Let us know if problems/concerns before then.       Cheri Posey    RE    [facility] Triage-   Phone Number: [phone]             Janay Hearn asked me to schedule a f/u CT scan of her thyroid which was due June, 2020.  Janay canceled her usual spring-fall trip to [place] and remained in [place] due to COVID-19 risk.  She will return to [place] mid-November.  I did just talk with a  who will contact you for a CT order.  We are hoping to schedule a video f/u visit with you after the scan.  She has not had any significant health issues since she saw you in July, 2019.  She looks forward to following up with you.     Thank you,   Sue Edwards (daughter-in-law)

## 2020-10-30 NOTE — TELEPHONE ENCOUNTER
[facility] Call Center    Phone Message    May a detailed message be left on voicemail: yes     Reason for Call: Order(s): Other:   Reason for requested: CT neck and chest - need new one placed - pt called imaging and no order on file.  Date needed: asap  Provider name:       Action Taken: Message routed to:  [facility] endocrin    Travel Screening: Not Applicable

## 2020-11-16 ENCOUNTER — HEALTH MAINTENANCE LETTER (OUTPATIENT)
Age: 80
End: 2020-11-16

## 2020-11-30 DIAGNOSIS — N18.30 TYPE 2 DIABETES MELLITUS WITH STAGE 3 CHRONIC KIDNEY DISEASE, WITHOUT LONG-TERM CURRENT USE OF INSULIN (H): ICD-10-CM

## 2020-11-30 DIAGNOSIS — I10 ESSENTIAL HYPERTENSION, BENIGN: ICD-10-CM

## 2020-11-30 DIAGNOSIS — E78.5 HYPERLIPIDEMIA LDL GOAL <100: ICD-10-CM

## 2020-11-30 DIAGNOSIS — N18.30 TYPE 2 DIABETES MELLITUS WITH STAGE 3 CHRONIC KIDNEY DISEASE, WITHOUT LONG-TERM CURRENT USE OF INSULIN, UNSPECIFIED WHETHER STAGE 3A OR 3B CKD (H): ICD-10-CM

## 2020-11-30 DIAGNOSIS — N18.30 STAGE 3 CHRONIC KIDNEY DISEASE, UNSPECIFIED WHETHER STAGE 3A OR 3B CKD (H): ICD-10-CM

## 2020-11-30 DIAGNOSIS — E11.22 TYPE 2 DIABETES MELLITUS WITH STAGE 3 CHRONIC KIDNEY DISEASE, WITHOUT LONG-TERM CURRENT USE OF INSULIN (H): ICD-10-CM

## 2020-11-30 DIAGNOSIS — N18.30 CKD (CHRONIC KIDNEY DISEASE) STAGE 3, GFR 30-59 ML/MIN (H): ICD-10-CM

## 2020-11-30 DIAGNOSIS — E04.1 RIGHT THYROID NODULE: ICD-10-CM

## 2020-11-30 DIAGNOSIS — E11.22 TYPE 2 DIABETES MELLITUS WITH STAGE 3 CHRONIC KIDNEY DISEASE, WITHOUT LONG-TERM CURRENT USE OF INSULIN, UNSPECIFIED WHETHER STAGE 3A OR 3B CKD (H): ICD-10-CM

## 2020-11-30 LAB — HBA1C MFR BLD: 6.6 % (ref 0–5.6)

## 2020-11-30 PROCEDURE — 82043 UR ALBUMIN QUANTITATIVE: CPT | Performed by: INTERNAL MEDICINE

## 2020-11-30 PROCEDURE — 36415 COLL VENOUS BLD VENIPUNCTURE: CPT | Performed by: INTERNAL MEDICINE

## 2020-11-30 PROCEDURE — 83036 HEMOGLOBIN GLYCOSYLATED A1C: CPT | Performed by: INTERNAL MEDICINE

## 2020-11-30 PROCEDURE — 80061 LIPID PANEL: CPT | Performed by: INTERNAL MEDICINE

## 2020-11-30 PROCEDURE — 84443 ASSAY THYROID STIM HORMONE: CPT | Performed by: INTERNAL MEDICINE

## 2020-11-30 PROCEDURE — 80048 BASIC METABOLIC PNL TOTAL CA: CPT | Performed by: INTERNAL MEDICINE

## 2020-12-01 ENCOUNTER — HOSPITAL ENCOUNTER (OUTPATIENT)
Dept: CT IMAGING | Facility: CLINIC | Age: 80
End: 2020-12-01
Payer: COMMERCIAL

## 2020-12-01 ENCOUNTER — OFFICE VISIT (OUTPATIENT)
Dept: INTERNAL MEDICINE | Facility: CLINIC | Age: 80
End: 2020-12-01
Payer: COMMERCIAL

## 2020-12-01 VITALS
OXYGEN SATURATION: 99 % | DIASTOLIC BLOOD PRESSURE: 92 MMHG | BODY MASS INDEX: 37.42 KG/M2 | RESPIRATION RATE: 16 BRPM | HEART RATE: 107 BPM | TEMPERATURE: 98.6 F | WEIGHT: 185.6 LBS | SYSTOLIC BLOOD PRESSURE: 195 MMHG | HEIGHT: 59 IN

## 2020-12-01 DIAGNOSIS — E66.01 MORBID OBESITY (H): ICD-10-CM

## 2020-12-01 DIAGNOSIS — N18.31 TYPE 2 DIABETES MELLITUS WITH STAGE 3A CHRONIC KIDNEY DISEASE, WITHOUT LONG-TERM CURRENT USE OF INSULIN (H): ICD-10-CM

## 2020-12-01 DIAGNOSIS — E04.1 RIGHT THYROID NODULE: ICD-10-CM

## 2020-12-01 DIAGNOSIS — J39.8 TRACHEAL COMPRESSION: ICD-10-CM

## 2020-12-01 DIAGNOSIS — Z00.00 ENCOUNTER FOR ANNUAL WELLNESS EXAM IN MEDICARE PATIENT: Primary | ICD-10-CM

## 2020-12-01 DIAGNOSIS — M79.18 RIGHT BUTTOCK PAIN: ICD-10-CM

## 2020-12-01 DIAGNOSIS — I10 BENIGN ESSENTIAL HYPERTENSION: ICD-10-CM

## 2020-12-01 DIAGNOSIS — E11.22 TYPE 2 DIABETES MELLITUS WITH STAGE 3A CHRONIC KIDNEY DISEASE, WITHOUT LONG-TERM CURRENT USE OF INSULIN (H): ICD-10-CM

## 2020-12-01 DIAGNOSIS — R35.0 URINARY FREQUENCY: ICD-10-CM

## 2020-12-01 DIAGNOSIS — E78.5 HYPERLIPIDEMIA LDL GOAL <100: ICD-10-CM

## 2020-12-01 DIAGNOSIS — K21.9 GASTROESOPHAGEAL REFLUX DISEASE WITHOUT ESOPHAGITIS: ICD-10-CM

## 2020-12-01 DIAGNOSIS — N18.31 STAGE 3A CHRONIC KIDNEY DISEASE (H): ICD-10-CM

## 2020-12-01 LAB
ANION GAP SERPL CALCULATED.3IONS-SCNC: 4 MMOL/L (ref 3–14)
BUN SERPL-MCNC: 14 MG/DL (ref 7–30)
CALCIUM SERPL-MCNC: 9.9 MG/DL (ref 8.5–10.1)
CHLORIDE SERPL-SCNC: 108 MMOL/L (ref 94–109)
CHOLEST SERPL-MCNC: 146 MG/DL
CO2 SERPL-SCNC: 29 MMOL/L (ref 20–32)
CREAT SERPL-MCNC: 0.93 MG/DL (ref 0.52–1.04)
CREAT UR-MCNC: 140 MG/DL
GFR SERPL CREATININE-BSD FRML MDRD: 58 ML/MIN/{1.73_M2}
GLUCOSE SERPL-MCNC: 124 MG/DL (ref 70–99)
HDLC SERPL-MCNC: 59 MG/DL
LDLC SERPL CALC-MCNC: 71 MG/DL
MICROALBUMIN UR-MCNC: 72 MG/L
MICROALBUMIN/CREAT UR: 51.64 MG/G CR (ref 0–25)
NONHDLC SERPL-MCNC: 87 MG/DL
POTASSIUM SERPL-SCNC: 4 MMOL/L (ref 3.4–5.3)
SODIUM SERPL-SCNC: 141 MMOL/L (ref 133–144)
TRIGL SERPL-MCNC: 82 MG/DL
TSH SERPL DL<=0.005 MIU/L-ACNC: 2.66 MU/L (ref 0.4–4)

## 2020-12-01 PROCEDURE — 99207 PR FOOT EXAM NO CHARGE: CPT | Mod: 25 | Performed by: INTERNAL MEDICINE

## 2020-12-01 PROCEDURE — 99214 OFFICE O/P EST MOD 30 MIN: CPT | Mod: 25 | Performed by: INTERNAL MEDICINE

## 2020-12-01 PROCEDURE — G0008 ADMIN INFLUENZA VIRUS VAC: HCPCS | Mod: 59 | Performed by: INTERNAL MEDICINE

## 2020-12-01 PROCEDURE — 90715 TDAP VACCINE 7 YRS/> IM: CPT | Performed by: INTERNAL MEDICINE

## 2020-12-01 PROCEDURE — 71250 CT THORAX DX C-: CPT

## 2020-12-01 PROCEDURE — 99397 PER PM REEVAL EST PAT 65+ YR: CPT | Mod: 25 | Performed by: INTERNAL MEDICINE

## 2020-12-01 PROCEDURE — 90662 IIV NO PRSV INCREASED AG IM: CPT | Performed by: INTERNAL MEDICINE

## 2020-12-01 PROCEDURE — 70490 CT SOFT TISSUE NECK W/O DYE: CPT

## 2020-12-01 PROCEDURE — 90471 IMMUNIZATION ADMIN: CPT | Performed by: INTERNAL MEDICINE

## 2020-12-01 RX ORDER — PRAVASTATIN SODIUM 40 MG
40 TABLET ORAL DAILY
Qty: 90 TABLET | Refills: 3 | Status: SHIPPED | OUTPATIENT
Start: 2020-12-01 | End: 2022-05-19

## 2020-12-01 RX ORDER — PIOGLITAZONEHYDROCHLORIDE 30 MG/1
30 TABLET ORAL DAILY
Qty: 90 TABLET | Refills: 3 | Status: SHIPPED | OUTPATIENT
Start: 2020-12-01 | End: 2021-11-11

## 2020-12-01 RX ORDER — BENAZEPRIL/HYDROCHLOROTHIAZIDE 20 MG-25MG
1 TABLET ORAL DAILY
Qty: 90 TABLET | Refills: 3 | Status: SHIPPED | OUTPATIENT
Start: 2020-12-01 | End: 2021-11-11

## 2020-12-01 RX ORDER — TOLTERODINE 4 MG/1
CAPSULE, EXTENDED RELEASE ORAL
Qty: 90 CAPSULE | Refills: 3 | Status: SHIPPED | OUTPATIENT
Start: 2020-12-01 | End: 2021-11-11

## 2020-12-01 RX ORDER — GLIMEPIRIDE 4 MG/1
8 TABLET ORAL
Qty: 180 TABLET | Refills: 3 | Status: SHIPPED | OUTPATIENT
Start: 2020-12-01 | End: 2021-11-11

## 2020-12-01 RX ORDER — PANTOPRAZOLE SODIUM 40 MG/1
40 TABLET, DELAYED RELEASE ORAL DAILY
Qty: 90 TABLET | Refills: 3 | Status: SHIPPED | OUTPATIENT
Start: 2020-12-01 | End: 2021-11-11

## 2020-12-01 RX ORDER — METOPROLOL SUCCINATE 50 MG/1
100 TABLET, EXTENDED RELEASE ORAL DAILY
Qty: 180 TABLET | Refills: 3 | Status: SHIPPED | OUTPATIENT
Start: 2020-12-01 | End: 2021-11-11

## 2020-12-01 ASSESSMENT — ENCOUNTER SYMPTOMS
ABDOMINAL PAIN: 0
CONSTIPATION: 0
WEAKNESS: 0
PALPITATIONS: 0
NAUSEA: 0
DYSURIA: 0
SHORTNESS OF BREATH: 0
MYALGIAS: 1
HEMATURIA: 0
NERVOUS/ANXIOUS: 0
ARTHRALGIAS: 1
JOINT SWELLING: 0
CHILLS: 0
HEMATOCHEZIA: 0
HEARTBURN: 1
FEVER: 0
EYE PAIN: 0
DIZZINESS: 0
FREQUENCY: 0
COUGH: 0
HEADACHES: 0
BREAST MASS: 0
SORE THROAT: 0
PARESTHESIAS: 0
DIARRHEA: 0

## 2020-12-01 ASSESSMENT — ACTIVITIES OF DAILY LIVING (ADL): CURRENT_FUNCTION: NO ASSISTANCE NEEDED

## 2020-12-01 ASSESSMENT — MIFFLIN-ST. JEOR: SCORE: 1227.51

## 2020-12-01 NOTE — PROGRESS NOTES
"SUBJECTIVE:   Janay Kyle is a 79 year old female who presents for Preventive Visit.      Patient has been advised of split billing requirements and indicates understanding: Yes   Are you in the first 12 months of your Medicare coverage?  No    Healthy Habits:     In general, how would you rate your overall health?  Good    Frequency of exercise:  None    Do you usually eat at least 4 servings of fruit and vegetables a day, include whole grains    & fiber and avoid regularly eating high fat or \"junk\" foods?  No    Taking medications regularly:  Yes    Medication side effects:  Not applicable    Ability to successfully perform activities of daily living:  No assistance needed    Home Safety:  Lack of grab bars in the bathroom    Hearing Impairment:  Need to ask people to speak up or repeat themselves    In the past 6 months, have you been bothered by leaking of urine? Yes    In general, how would you rate your overall mental or emotional health?  Good      PHQ-2 Total Score: 0    Additional concerns today:  Yes    Do you feel safe in your environment? Yes    Have you ever done Advance Care Planning? (For example, a Health Directive, POLST, or a discussion with a medical provider or your loved ones about your wishes): No, advance care planning information given to patient to review.  Patient plans to discuss their wishes with loved ones or provider.        Fall risk  Fallen 2 or more times in the past year?: Yes  Any fall with injury in the past year?: No  click delete button to remove this line now  Cognitive Screening   1) Repeat 3 items (Leader, Season, Table)    2) Clock draw: NORMAL  3) 3 item recall: Recalls 1 object   Results: NORMAL clock, 1-2 items recalled: COGNITIVE IMPAIRMENT LESS LIKELY    Mini-CogTM Copyright KRISTEN Isabel. Licensed by the author for use in [facility]; reprinted with permission ([email or web address]@.[email or web address]). All rights reserved.      Do you have sleep apnea, excessive snoring or daytime " drowsiness?: yes    Reviewed and updated as needed this visit by clinical staff                  Problems:  She had been in [place] for about a year, just recently returned.   1.  Diabetes: She has not been checking her blood sugars.  Her hemoglobin A1c was well controlled.  2.  CKD: Labs are stable  3.  Hypertension:  she has not had any blood pressure checks done.  She has been anxious about coming in due to the pandemic.  4.  Thyroid nodule: Imaging results are pending per endocrinology.  She is not having any breathing difficulty but occasionally can hear a little noise in her throat when exerting herself.  5. GERD: Controlled on medication    Other concerns:  1.  She is complaining of hearing some cracking noises in her left ear, no hearing loss or pain.  2.  She has some right buttock pain.  This is 2-3 times a week, intermittent, sharp pain that comes on suddenly with walking usually, no clear triggers.  It will be gone in between episodes.  No radiation, no central back pain    Patient Active Problem List   Diagnosis     Benign essential hypertension     Esophageal reflux     HYPERLIPIDEMIA LDL GOAL <100     CKD (chronic kidney disease) stage 3, GFR 30-59 ml/min (H)     Benign paroxysmal positional vertigo     Morbid obesity (H)     Type 2 diabetes mellitus with stage 3 chronic kidney disease, without long-term current use of insulin (H)     Tracheal compression     Right thyroid nodule     Current Outpatient Medications   Medication Sig Dispense Refill     Acetaminophen (TYLENOL) 325 MG CAPS Take 325-650 mg by mouth every 4 hours as needed       Ascorbic Acid (VITAMIN C CR) 1000 MG TBCR Take 1 tablet by mouth daily        aspirin 81 MG tablet Take 1 tablet (81 mg) by mouth daily       benazepril-hydrochlorothiazide (LOTENSIN HCT) 20-25 MG tablet Take 1 tablet by mouth daily 90 tablet 3     Calcium Citrate-Vitamin D (CALCIUM CITRATE + D) 315-200 MG-UNIT TABS Take 1 tablet by mouth 2 times daily.        glimepiride (AMARYL) 4 MG tablet Take 2 tablets (8 mg) by mouth every morning (before breakfast) 180 tablet 3     metoprolol succinate ER (TOPROL-XL) 50 MG 24 hr tablet Take 2 tablets (100 mg) by mouth daily 180 tablet 3     Multiple Vitamin (DAILY MULTIVITAMIN PO) Take 1 tablet by mouth daily.       pantoprazole (PROTONIX) 40 MG EC tablet Take 1 tablet (40 mg) by mouth daily 90 tablet 3     pioglitazone (ACTOS) 30 MG tablet Take 1 tablet (30 mg) by mouth daily 90 tablet 3     pravastatin (PRAVACHOL) 40 MG tablet Take 1 tablet (40 mg) by mouth daily 90 tablet 3     tolterodine ER (DETROL LA) 4 MG 24 hr capsule TAKE ONE CAPSULE BY MOUTH ONCE DAILY 90 capsule 3        Reviewed and updated as needed this visit by Provider                Social History     Tobacco Use     Smoking status: Former Smoker     Smokeless tobacco: Never Used     Tobacco comment: quit 1995   Substance Use Topics     Alcohol use: No     If you drink alcohol do you typically have >3 drinks per day or >7 drinks per week? No    Alcohol Use 12/1/2020   Prescreen: >3 drinks/day or >7 drinks/week? Not Applicable   No flowsheet data found.        Current providers sharing in care for this patient include:   Patient Care Team:  Brandi Rai MD as PCP - General  Brandi Rai MD as Assigned PCP  Cheri Posey MD as Assigned Endocrinology Provider    The following health maintenance items are reviewed in Epic and correct as of today:  Health Maintenance   Topic Date Due     HEPATITIS C SCREENING  12/29/1958     ZOSTER IMMUNIZATION (1 of 2) 12/29/1991     MEDICARE ANNUAL WELLNESS VISIT  11/02/2010     DTAP/TDAP/TD IMMUNIZATION (2 - Td) 11/02/2019     DIABETIC FOOT EXAM  04/23/2020     INFLUENZA VACCINE (1) 09/01/2020     EYE EXAM  10/14/2020     FALL RISK ASSESSMENT  10/14/2020     A1C  05/30/2021     BMP  11/30/2021     LIPID  11/30/2021     MICROALBUMIN  11/30/2021     ADVANCE CARE PLANNING  10/08/2023     PHQ-2  Completed     Pneumococcal  "Vaccine: 65+ Years  Completed     Pneumococcal Vaccine: Pediatrics (0 to 5 Years) and At-Risk Patients (6 to 64 Years)  Aged Out     IPV IMMUNIZATION  Aged Out     MENINGITIS IMMUNIZATION  Aged Out         Review of Systems   Constitutional: Negative for chills and fever.   HENT: Positive for ear pain and hearing loss. Negative for congestion and sore throat.    Eyes: Negative for pain and visual disturbance.   Respiratory: Negative for cough and shortness of breath.    Cardiovascular: Positive for peripheral edema. Negative for chest pain and palpitations.   Gastrointestinal: Positive for heartburn. Negative for abdominal pain, constipation, diarrhea, hematochezia and nausea.   Breasts:  Negative for tenderness, breast mass and discharge.   Genitourinary: Negative for dysuria, frequency, genital sores, hematuria, pelvic pain, urgency, vaginal bleeding and vaginal discharge.   Musculoskeletal: Positive for arthralgias and myalgias. Negative for joint swelling.   Skin: Negative for rash.   Neurological: Negative for dizziness, weakness, headaches and paresthesias.   Psychiatric/Behavioral: Negative for mood changes. The patient is not nervous/anxious.    Edema is stable        OBJECTIVE:      Physical Exam      Patient alert, in no acute distress  BP (!) 195/92   Pulse 107   Temp 98.6  F (37  C) (Oral)   Resp 16   Ht 1.499 m (4' 11\")   Wt 84.2 kg (185 lb 9.6 oz)   SpO2 99%   BMI 37.49 kg/m      HEENT: extraocular movements are intact, pupils equal and reactive to light and accommodation, No significant wax in the left ear, there is dullness of the TM  NECK: Neck supple. No adenopathy. Thyroid nodule on the right,, Carotids without bruits. No stridor  PULMONARY: clear to auscultation  CARDIAC: regular rate and rhythm and no murmurs, clicks, or gallops  PULSES: 2/2 throughout  BACK: no spinal or CVAT  ABDOMINAL: Soft, nontender.  Normal bowel sounds.  No hepatosplenomegaly or abnormal masses  BREAST: No breast " masses or tenderness, No axillary masses or tenderness and No galactorrhea    REFLEXES: 2+ throughout  Feet: Trace edema of the legs, normal pulses, normal light touch  Right buttock: No significant tenderness     Lab Results   Component Value Date    A1C 6.6 11/30/2020    A1C 7.4 10/11/2019    A1C 7.3 04/15/2019    A1C 7.8 10/06/2018    A1C 7.0 04/20/2018          Lab Results   Component Value Date    HDL 59 11/30/2020    LDL 71 11/30/2020    CHOL 146 11/30/2020    TRIG 82 11/30/2020          ASSESSMENT / PLAN:   1. Encounter for annual wellness exam in Medicare patient  Up-to-date, reassured the ears clear, do some eustachian tube dysfunction can try blowing exercises     2. Type 2 diabetes mellitus with stage 3a chronic kidney disease, without long-term current use of insulin (H)  Well-controlled, continue medication  - glimepiride (AMARYL) 4 MG tablet; Take 2 tablets (8 mg) by mouth every morning (before breakfast)  Dispense: 180 tablet; Refill: 3  - pioglitazone (ACTOS) 30 MG tablet; Take 1 tablet (30 mg) by mouth daily  Dispense: 90 tablet; Refill: 3    3. Stage 3a chronic kidney disease  Stable, continue medication    4. Morbid obesity (H)  Work on weight loss, diet and exercise    5. Benign essential hypertension  Blood pressure is quite elevated, recommend outside checks or come in for nurse check in a few weeks    6. Hyperlipidemia LDL goal <100  Controlled, continue medication  - pravastatin (PRAVACHOL) 40 MG tablet; Take 1 tablet (40 mg) by mouth daily  Dispense: 90 tablet; Refill: 3    7. Right buttock pain  Probably muscle spasm, given a handout with some stretches and exercises    8. Gastroesophageal reflux disease without esophagitis  Stable on medication  - pantoprazole (PROTONIX) 40 MG EC tablet; Take 1 tablet (40 mg) by mouth daily  Dispense: 90 tablet; Refill: 3    9. Urinary frequency    - tolterodine ER (DETROL LA) 4 MG 24 hr capsule; TAKE ONE CAPSULE BY MOUTH ONCE DAILY  Dispense: 90 capsule;  "Refill: 3    Patient has been advised of split billing requirements and indicates understanding: Yes  COUNSELING:  Reviewed preventive health counseling, as reflected in patient instructions    Estimated body mass index is 36.96 kg/m  as calculated from the following:    Height as of 10/14/19: 1.499 m (4' 11\").    Weight as of 10/14/19: 83 kg (183 lb).    Weight management plan: Discussed healthy diet and exercise guidelines    She reports that she has quit smoking. She has never used smokeless tobacco.      Appropriate preventive services were discussed with this patient, including applicable screening as appropriate for cardiovascular disease, diabetes, osteopenia/osteoporosis, and glaucoma.  As appropriate for age/gender, discussed screening for colorectal cancer, prostate cancer, breast cancer, and cervical cancer. Checklist reviewing preventive services available has been given to the patient.    Reviewed patients plan of care and provided an AVS. The Basic Care Plan (routine screening as documented in Health Maintenance) for Janay meets the Care Plan requirement. This Care Plan has been established and reviewed with the Patient.    Counseling Resources:  ATP IV Guidelines  Pooled Cohorts Equation Calculator  Breast Cancer Risk Calculator  Breast Cancer: Medication to Reduce Risk  FRAX Risk Assessment  ICSI Preventive Guidelines  Dietary Guidelines for Americans, 2010  [employer]'s MyPlate  ASA Prophylaxis  Lung CA Screening    Brandi Rai MD  [facility]    Identified Health Risks:  "

## 2020-12-01 NOTE — NURSING NOTE
"BP (!) 190/95 (BP Location: Right arm, Patient Position: Sitting, Cuff Size: Adult Regular)   Pulse 107   Temp 98.6  F (37  C) (Oral)   Resp 16   Ht 1.499 m (4' 11\")   Wt 84.2 kg (185 lb 9.6 oz)   SpO2 99%   BMI 37.49 kg/m      "

## 2020-12-02 NOTE — PROGRESS NOTES
I have reviewed images on PACS  12/1/2020 CT neck and chest - compared with 6/17/19 CT chest:   right thyroid >> left  With tracheal deviation to the left, some tracheal compression . This was also seen on the 8/10/16  CT angio of the head/ neck.  The gland minimally extends substernally.    Maximum widest right lobe is 3,2 x 3.2 x 4.3  - was  3.5 x 4.6 x  (was 3.7 x 4.3 in 8/2016      CT CHEST WITHOUT CONTRAST 12/1/2020 1:25 PM     CLINICAL HISTORY: Right thyroid nodule. Tracheal compression.     TECHNIQUE: CT chest without IV contrast. Multiplanar reformats were  obtained. Dose reduction techniques were used.  CONTRAST: None.     COMPARISON: None.     FINDINGS:   LUNGS AND PLEURA: No effusions. No acute airspace disease. Stable  right middle lobe nodule adjacent to the minor fissure that is 0.3 cm  series 4 image 151. Mild atelectasis at the lingula.     MEDIASTINUM/AXILLAE: Again seen is a prominent right thyroid nodule that is 3.2 x 3.2 x 4.3 cm, previously 3.6 x 4.4 x 4.5 cm, series 2 image 5. It causes leftward tracheal deviation and narrowing.Remainder of the mediastinum shows no acute abnormality. Scatteredthoracic aortic calcifications. No enlarged lymph nodes are seen.Small bilateral axillary lymph nodes again noted.     UPPER ABDOMEN: Polycystic appearing right kidney again identified.  This is partially included for imaging. If relevant, further  assessment with renal specific CT or MRI could be performed.  Cholecystectomy.     MUSCULOSKELETAL: No new significant abnormality. Spine mild  degenerative changes.                                                                      IMPRESSION:   1.  Slightly smaller size of a right thyroid nodule causing leftward tracheal deviation and narrowing.  2.  Stable right middle lobe nodule, see below for follow up imaging guidelines if applicable. This appears stable since 6/17/2019.     Recommendations for one or multiple incidental lung nodules < 6 mm :    Low  risk patients: No routine follow-up.    High risk patients: Optional follow-up CT at 12 months; if  unchanged, no further follow-up.     *Low Risk: Minimal or absent history of smoking or other known risk  factors.  *Nonsolid (ground glass) or partly solid nodules may require longer  follow-up to exclude indolent adenocarcinoma.  *Recommendations based on Guidelines for the Management of Incidental  Pulmonary Nodules Detected at CT: From the Fleischner Society 2017,  Radiology 2017.     TRIXIE STERLING MD\    CT SCAN OF THE NECK WITHOUT CONTRAST  12/1/2020 1:28 PM      HISTORY: Right thyroid nodule. Tracheal compression.     TECHNIQUE:  Axial images and coronal reformations. No IV contrast.  Radiation dose for this scan was reduced using automated exposure  control, adjustment of the mA and/or kV according to patient size, or  iterative reconstruction technique.     COMPARISON: None.      FINDINGS:  Evaluation is limited given lack of intravenous contrast.     There is a large nodule in the right thyroid gland that appears  heterogeneous on this noncontrast study measuring at least 4.0 x 3.4 x  3.4 cm. This right-sided thyroid gland nodule deviates the trachea to  the left of midline. There are also likely several additional thyroid  gland nodules present including an exophytic nodule projecting  inferiorly from the left thyroid gland.     No other suspicious masses or inflammation appreciated within the neck  noting limitations by noncontrast technique.     No suspicious enlarged or calcified cervical lymph nodes.     The parotid and submandibular glands are unremarkable.     Visualized lung apices are clear.     There are degenerative changes in the spine. No destructive bony  lesions are appreciated.                                                                      IMPRESSION:    1. Large right thyroid gland nodule measuring up to at least 4 cm  which causes mass effect and deviation of the trachea to the left  of  midline. This was reportedly previously biopsied on ultrasound  7/8/2019. There are likely several additional thyroid gland nodules  present. These would be better assessed with thyroid ultrasound if  clinically indicated.  2. No other suspicious masses within the neck by noncontrast CT  technique.  3. No suspicious cervical chain lymph nodes.      MARIO SHI MD

## 2020-12-19 ENCOUNTER — TRANSFERRED RECORDS (OUTPATIENT)
Dept: HEALTH INFORMATION MANAGEMENT | Facility: CLINIC | Age: 80
End: 2020-12-19

## 2020-12-21 ENCOUNTER — TELEPHONE (OUTPATIENT)
Dept: NEUROSURGERY | Facility: CLINIC | Age: 80
End: 2020-12-21

## 2020-12-21 NOTE — TELEPHONE ENCOUNTER
[facility] Call Center    Phone Message    May a detailed message be left on voicemail: yes     Reason for Call: Other: Pt's Daughter-in-law Sue called to schedule appt.  She indicates Pt was referred after urgent care visit. No referral on system yet. Sue report Pt was Dxd with cerebral aneurysm.    Please call to schedule at   Day phone [phone]  Or cell [phone] after 4pm      Action Taken: Message routed to:  [facility] Neurosurgery    Travel Screening: Not Applicable

## 2020-12-22 ENCOUNTER — MYC MEDICAL ADVICE (OUTPATIENT)
Dept: INTERNAL MEDICINE | Facility: CLINIC | Age: 80
End: 2020-12-22

## 2020-12-22 DIAGNOSIS — R42 VERTIGO: Primary | ICD-10-CM

## 2020-12-22 RX ORDER — ONDANSETRON 4 MG/1
4 TABLET, FILM COATED ORAL EVERY 6 HOURS PRN
Qty: 40 TABLET | Refills: 1 | Status: SHIPPED | OUTPATIENT
Start: 2020-12-22 | End: 2021-11-11

## 2020-12-22 RX ORDER — MECLIZINE HYDROCHLORIDE 25 MG/1
25 TABLET ORAL 3 TIMES DAILY PRN
Qty: 30 TABLET | Refills: 1 | Status: SHIPPED | OUTPATIENT
Start: 2020-12-22 | End: 2021-11-11

## 2020-12-22 RX ORDER — DIAZEPAM 5 MG
2.5-5 TABLET ORAL EVERY 8 HOURS PRN
Qty: 20 TABLET | Refills: 0 | Status: SHIPPED | OUTPATIENT
Start: 2020-12-22 | End: 2021-11-11

## 2020-12-30 ENCOUNTER — TELEPHONE (OUTPATIENT)
Dept: INTERNAL MEDICINE | Facility: CLINIC | Age: 80
End: 2020-12-30

## 2020-12-31 NOTE — TELEPHONE ENCOUNTER
[facility] Call Center    Phone Message    May a detailed message be left on voicemail: yes     Reason for Call: Other: Remigiolaz returning call. Please call her back.      Action Taken: Message routed to:  [facility] sarah neuro     Travel Screening: Not Applicable

## 2021-01-07 ENCOUNTER — PRE VISIT (OUTPATIENT)
Dept: NEUROSURGERY | Facility: CLINIC | Age: 81
End: 2021-01-07

## 2021-01-07 NOTE — TELEPHONE ENCOUNTER
FUTURE VISIT INFORMATION      FUTURE VISIT INFORMATION:    Date: 1/11/2021    Time: 3pm    Location: [facility] [place]  REFERRAL INFORMATION:    Referring provider:  Ciro GODWIN    Referring providers clinic:  [facility]     Reason for visit/diagnosis Aneurysm     RECORDS REQUESTED FROM:       Clinic name Comments Records Status Imaging Status   [facility] [place]-12/19/2020 Scanned to Chart Requested all Images                                    1/7/2021-Request faxed to [facility] for images to be pushed ASAP-MR @ 122pm    1/12/2021-[facility] Images now in PACS-MR @ 603am

## 2021-01-11 ENCOUNTER — VIRTUAL VISIT (OUTPATIENT)
Dept: NEUROSURGERY | Facility: CLINIC | Age: 81
End: 2021-01-11
Payer: COMMERCIAL

## 2021-01-11 DIAGNOSIS — I67.1 CEREBRAL ANEURYSM, NONRUPTURED: Primary | ICD-10-CM

## 2021-01-11 PROCEDURE — 99203 OFFICE O/P NEW LOW 30 MIN: CPT | Mod: 95 | Performed by: NEUROLOGICAL SURGERY

## 2021-01-11 NOTE — LETTER
2021      RE: Janay yKle  [address] [facility] [facility] [place] [address]       Janay is a 80 year old who is being evaluated via a billable video visit.      Patient consented to the visit.      Video-Visit Details    Type of service:  Video Visit    Video Time: 10 min    Originating Location (pt. Location): Home    Distant Location (provider location):  [facility] NEUROSURGERY [facility]     Platform used for Video Visit: Appwapp      Service Date: 2021      Brandi Rai MD   [facility]   [address]   [place]  [address]      RE: Janay Kyle   MRN: [id number]   : 1940      Dear Dr. Rai:      This is regarding a neurosurgical video visit that Janay had consented to.      I had the pleasure to meet and see Janay today during a neurosurgical video clinic visit.      Briefly, Janay is a 80-year-old woman who recently had a bout of dizziness and presented to an urgent care in [place].  A head CT/CT angiogram was performed and it demonstrated apparently an aneurysm.  I do not have any of the reports available to me.  I do have the CT angiogram and have reviewed and interpreted it.      Symptomatically, Janay is doing well.  Her dizziness has resolved, and she does not have any other neurologic problems.      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  I did review a CT angiogram of the head from 3 years ago that was reported as negative.  However, the small aneurysm arising off of the ophthalmic segment was seen previously.  The middle cerebral artery makes the abnormality that is seen on this most recent scan, which I would note was not a very high quality scan, that appeared to be an infundibulum.      These abnormalities are extremely small and I  would not recommend any treatment at this point in time and I think the chances of a hemorrhage from either of them would be exceedingly low.  My recommendation is to repeat a CT angiogram in 2 years and to see Janay at that point in time.  She is very comfortable with this plan.      Sincerely,      Leodan Sarmiento MD      Overall, I spent approximately 10 minutes on the video call with Janay with the majority of this time spent in consultation and developing a treatment plan.      D: 2021   T: 2021   MT: [email or web address]      Name:     JANAY LACEY   MRN:      [id number]        Account:      [id number]   :      1940           Service Date: 2021      Document: [id number]

## 2021-01-11 NOTE — LETTER
1/11/2021       RE: Janay Kyle  [address] [facility] [address]     Dear Colleague,    Thank you for referring your patient, Janay Klye, to the [facility] NEUROSURGERY [facility]. Please see a copy of my visit note below.    Janay is a 80 year old who is being evaluated via a billable video visit.      Patient consented to the visit.      Video-Visit Details    Type of service:  Video Visit    Video Time: 10 min    Originating Location (pt. Location): Home    Distant Location (provider location):  [facility] NEUROSURGERY [facility]     Platform used for Video Visit: Lorna        Again, thank you for allowing me to participate in the care of your patient.      Sincerely,    Leodan Sarmiento MD

## 2021-01-11 NOTE — PROGRESS NOTES
Janay is a 80 year old who is being evaluated via a billable video visit.      Patient consented to the visit.      Video-Visit Details    Type of service:  Video Visit    Video Time: 10 min    Originating Location (pt. Location): Home    Distant Location (provider location):  [facility] NEUROSURGERY [facility]     Platform used for Video Visit: [email or web address]

## 2021-01-11 NOTE — PROGRESS NOTES
Service Date: 2021      Brandi Rai MD   [facility]   [address] [facility]   [place]  [address]      RE: Janay Kyle   MRN: [id number]   : 1940      Dear Dr. Rai:      This is regarding a neurosurgical video visit that Janay had consented to.      I had the pleasure to meet and see Janay today during a neurosurgical video clinic visit.      Briefly, Janay is a 80-year-old woman who recently had a bout of dizziness and presented to an urgent care in [place].  A head CT/CT angiogram was performed and it demonstrated apparently an aneurysm.  I do not have any of the reports available to me.  I do have the CT angiogram and have reviewed and interpreted it.      Symptomatically, Janay is doing well.  Her dizziness has resolved, and she does not have any other neurologic problems.      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  I did review a CT angiogram of the head from 3 years ago that was reported as negative.  However, the small aneurysm arising off of the ophthalmic segment was seen previously.  The middle cerebral artery makes the abnormality that is seen on this most recent scan, which I would note was not a very high quality scan, that appeared to be an infundibulum.      These abnormalities are extremely small and I would not recommend any treatment at this point in time and I think the chances of a hemorrhage from either of them would be exceedingly low.  My recommendation is to repeat a CT angiogram in 2 years and to see Janay at that point in time.  She is very comfortable with this plan.      Sincerely,      Leodan Sarmiento MD      Overall, I spent approximately 10 minutes on the video call with Janay with the majority of this time spent in consultation and  developing a treatment plan.         TRIXIE WILSON MD             D: 2021   T: 2021   MT: [email or web address]      Name:     FRANCES LACEY   MRN:      [id number]        Account:      [id number]   :      1940           Service Date: 2021      Document: [id number]

## 2021-02-26 ENCOUNTER — TRANSFERRED RECORDS (OUTPATIENT)
Dept: HEALTH INFORMATION MANAGEMENT | Facility: CLINIC | Age: 81
End: 2021-02-26

## 2021-02-26 LAB — RETINOPATHY: NEGATIVE

## 2021-03-10 ENCOUNTER — MYC MEDICAL ADVICE (OUTPATIENT)
Dept: INTERNAL MEDICINE | Facility: CLINIC | Age: 81
End: 2021-03-10

## 2021-06-07 ENCOUNTER — MYC MEDICAL ADVICE (OUTPATIENT)
Dept: INTERNAL MEDICINE | Facility: CLINIC | Age: 81
End: 2021-06-07

## 2021-06-07 NOTE — TELEPHONE ENCOUNTER
Will route to primary care provider for review. Patient has future orders for A1C and BMP in chart, does provider want anything else?

## 2021-06-08 NOTE — TELEPHONE ENCOUNTER
No, those are the only orders the patient needs.  It would have been helpful to send the message back for them to go ahead and schedule the labs since lab scheduling is so delayed.

## 2021-06-11 DIAGNOSIS — E11.22 TYPE 2 DIABETES MELLITUS WITH STAGE 3A CHRONIC KIDNEY DISEASE, WITHOUT LONG-TERM CURRENT USE OF INSULIN (H): ICD-10-CM

## 2021-06-11 DIAGNOSIS — N18.31 TYPE 2 DIABETES MELLITUS WITH STAGE 3A CHRONIC KIDNEY DISEASE, WITHOUT LONG-TERM CURRENT USE OF INSULIN (H): ICD-10-CM

## 2021-06-11 LAB
ANION GAP SERPL CALCULATED.3IONS-SCNC: 7 MMOL/L (ref 3–14)
BUN SERPL-MCNC: 27 MG/DL (ref 7–30)
CALCIUM SERPL-MCNC: 10 MG/DL (ref 8.5–10.1)
CHLORIDE SERPL-SCNC: 103 MMOL/L (ref 94–109)
CO2 SERPL-SCNC: 27 MMOL/L (ref 20–32)
CREAT SERPL-MCNC: 1.13 MG/DL (ref 0.52–1.04)
GFR SERPL CREATININE-BSD FRML MDRD: 46 ML/MIN/{1.73_M2}
GLUCOSE SERPL-MCNC: 140 MG/DL (ref 70–99)
HBA1C MFR BLD: 7.2 % (ref 0–5.6)
POTASSIUM SERPL-SCNC: 4.2 MMOL/L (ref 3.4–5.3)
SODIUM SERPL-SCNC: 137 MMOL/L (ref 133–144)

## 2021-06-11 PROCEDURE — 36415 COLL VENOUS BLD VENIPUNCTURE: CPT | Performed by: INTERNAL MEDICINE

## 2021-06-11 PROCEDURE — 83036 HEMOGLOBIN GLYCOSYLATED A1C: CPT | Performed by: INTERNAL MEDICINE

## 2021-06-11 PROCEDURE — 80048 BASIC METABOLIC PNL TOTAL CA: CPT | Performed by: INTERNAL MEDICINE

## 2021-06-21 ENCOUNTER — OFFICE VISIT (OUTPATIENT)
Dept: INTERNAL MEDICINE | Facility: CLINIC | Age: 81
End: 2021-06-21
Payer: COMMERCIAL

## 2021-06-21 ENCOUNTER — ANCILLARY PROCEDURE (OUTPATIENT)
Dept: GENERAL RADIOLOGY | Facility: CLINIC | Age: 81
End: 2021-06-21
Attending: INTERNAL MEDICINE
Payer: COMMERCIAL

## 2021-06-21 VITALS
TEMPERATURE: 98.1 F | DIASTOLIC BLOOD PRESSURE: 72 MMHG | HEIGHT: 59 IN | SYSTOLIC BLOOD PRESSURE: 128 MMHG | RESPIRATION RATE: 20 BRPM | BODY MASS INDEX: 39.31 KG/M2 | WEIGHT: 195 LBS | OXYGEN SATURATION: 97 % | HEART RATE: 95 BPM

## 2021-06-21 DIAGNOSIS — M79.605 PAIN IN BOTH LOWER EXTREMITIES: ICD-10-CM

## 2021-06-21 DIAGNOSIS — N18.31 STAGE 3A CHRONIC KIDNEY DISEASE (H): ICD-10-CM

## 2021-06-21 DIAGNOSIS — E78.5 HYPERLIPIDEMIA LDL GOAL <100: ICD-10-CM

## 2021-06-21 DIAGNOSIS — M54.50 BILATERAL LOW BACK PAIN WITHOUT SCIATICA, UNSPECIFIED CHRONICITY: ICD-10-CM

## 2021-06-21 DIAGNOSIS — I10 BENIGN ESSENTIAL HYPERTENSION: ICD-10-CM

## 2021-06-21 DIAGNOSIS — R42 VERTIGO: ICD-10-CM

## 2021-06-21 DIAGNOSIS — E11.22 TYPE 2 DIABETES MELLITUS WITH STAGE 3A CHRONIC KIDNEY DISEASE, WITHOUT LONG-TERM CURRENT USE OF INSULIN (H): Primary | ICD-10-CM

## 2021-06-21 DIAGNOSIS — E66.01 MORBID OBESITY (H): ICD-10-CM

## 2021-06-21 DIAGNOSIS — N18.31 TYPE 2 DIABETES MELLITUS WITH STAGE 3A CHRONIC KIDNEY DISEASE, WITHOUT LONG-TERM CURRENT USE OF INSULIN (H): Primary | ICD-10-CM

## 2021-06-21 DIAGNOSIS — M79.604 PAIN IN BOTH LOWER EXTREMITIES: ICD-10-CM

## 2021-06-21 PROCEDURE — 72100 X-RAY EXAM L-S SPINE 2/3 VWS: CPT | Performed by: RADIOLOGY

## 2021-06-21 PROCEDURE — 99214 OFFICE O/P EST MOD 30 MIN: CPT | Performed by: INTERNAL MEDICINE

## 2021-06-21 ASSESSMENT — MIFFLIN-ST. JEOR: SCORE: 1269.1

## 2021-06-21 NOTE — PROGRESS NOTES
"    Assessment & Plan     Type 2 diabetes mellitus with stage 3a chronic kidney disease, without long-term current use of insulin (H)  Suboptimal control, may be related to weight gain and decreased activity.  Urged her to follow her diet, start checking her blood sugars, work on increasing exercise.  Recheck in 4 months  - blood glucose (NO BRAND SPECIFIED) lancets standard; Use to test blood sugar 1 times daily or as directed.  - blood glucose (NO BRAND SPECIFIED) test strip; Use to test blood sugars 1 times daily or as directed    Morbid obesity (H)  Has increased weight, encouraged her to be more active, work on weight loss    Stage 3a chronic kidney disease  Overall stable, recheck next time    Benign essential hypertension  Well-controlled, continue medication    Hyperlipidemia LDL goal <100  Stable, will have her hold the pravastatin for at least a few weeks to make sure it is not contributing to her leg symptoms, if it is bothering her, call    Vertigo  Her symptoms still sound most suggestive of benign positional vertigo but will refer to ENT per the family request  - OTOLARYNGOLOGY REFERRAL    Pain in both lower extremities  Etiology is overall unclear, a lot of it seems to be stiffness in the buttocks and thighs that may be related to her back.  She may have some neurogenic claudication with walking that is contributing to the heavy feeling.  We will start with a back x-ray, consider referral to orthopedics    Bilateral low back pain without sciatica, unspecified chronicity  As above  - XR Lumbar Spine 2/3 Views       BMI:   Estimated body mass index is 39.05 kg/m  as calculated from the following:    Height as of this encounter: 1.505 m (4' 11.25\").    Weight as of this encounter: 88.5 kg (195 lb).   Weight management plan: Discussed healthy diet and exercise guidelines        No follow-ups on file.    Brandi Rai MD  [facility] Institute[facility] Gustafson is a 80 year old who " presents for the following health issues  accompanied by her daughter-in-law:    HPI     Diabetes Follow-up      How often are you checking your blood sugar? Not at all, she would like refills of lancets and test strips so she can check them.  She thinks her diet is stable.  She may be less active    What concerns do you have today about your diabetes? None     Do you have any of these symptoms? (Select all that apply)  Numbness in feet and Burning in feet      Hyperlipidemia Follow-Up      Are you regularly taking any medication or supplement to lower your cholesterol?   Yes- prevastatin     Are you having muscle aches or other side effects that you think could be caused by your cholesterol lowering medication?  No    Hypertension Follow-up      Do you check your blood pressure regularly outside of the clinic? Yes     Are you following a low salt diet? No    Are your blood pressures ever more than 140 on the top number (systolic) OR more   than 90 on the bottom number (diastolic), for example 140/90? Yes        How many servings of fruits and vegetables do you eat daily?  4 or more    On average, how many sweetened beverages do you drink each day (Examples: soda, juice, sweet tea, etc.  Do NOT count diet or artificially sweetened beverages)?   0    How many days per week do you exercise enough to make your heart beat faster? 3 or less    How many minutes a day do you exercise enough to make your heart beat faster? 9 or less    How many days per week do you miss taking your medication? 0      Other problems:   1.  Morbid obesity: Weight has gone up 10 pounds  in 6 months  2.  Vertigo: She is continued to be very bothered by this ever since she went to the ED in December for this.  She was given some Valium and meclizine, takes occasionally.  She describes it is primarily movement related but her daughter-in-law thinks it is also occurring completely at rest.  The daughter-in-law is requesting a referral to an  ENT.  3.  CKD: Labs stable    Current concerns:   1.  She is complaining of leg symptoms: She feels like her legs are weak but also some discomfort, stiffness.  She does not really describe pain or heaviness with walking like claudication.  She is mostly bothered by stiffness when she stands up from sitting.  That is primarily localized to the back and the buttocks with some stiffness in the upper legs.  She is not having significant pain in the hip joints or knees.      2.  She complains of some swelling in the ankles and has had some pain at the right lateral ankle joint on stairs, sometimes it has a little burning sensation which may be related to the edema.      Patient Active Problem List   Diagnosis     Benign essential hypertension     Esophageal reflux     Hyperlipidemia LDL goal <100     CKD (chronic kidney disease) stage 3, GFR 30-59 ml/min (H)     Benign paroxysmal positional vertigo     Morbid obesity (H)     Type 2 diabetes mellitus with stage 3 chronic kidney disease, without long-term current use of insulin (H)     Tracheal compression     Right thyroid nodule       Current Outpatient Medications   Medication Sig Dispense Refill     Acetaminophen (TYLENOL) 325 MG CAPS Take 325-650 mg by mouth every 4 hours as needed       Ascorbic Acid (VITAMIN C CR) 1000 MG TBCR Take 1 tablet by mouth daily        aspirin 81 MG tablet Take 1 tablet (81 mg) by mouth daily       benazepril-hydrochlorothiazide (LOTENSIN HCT) 20-25 MG tablet Take 1 tablet by mouth daily 90 tablet 3     Calcium Citrate-Vitamin D (CALCIUM CITRATE + D) 315-200 MG-UNIT TABS Take 1 tablet by mouth 2 times daily.       diazepam (VALIUM) 5 MG tablet Take 0.5-1 tablets (2.5-5 mg) by mouth every 8 hours as needed (vertigo) 20 tablet 0     glimepiride (AMARYL) 4 MG tablet Take 2 tablets (8 mg) by mouth every morning (before breakfast) 180 tablet 3     meclizine (ANTIVERT) 25 MG tablet Take 1 tablet (25 mg) by mouth 3 times daily as needed for  "dizziness 30 tablet 1     metoprolol succinate ER (TOPROL-XL) 50 MG 24 hr tablet Take 2 tablets (100 mg) by mouth daily 180 tablet 3     Multiple Vitamin (DAILY MULTIVITAMIN PO) Take 1 tablet by mouth daily.       ondansetron (ZOFRAN) 4 MG tablet Take 1 tablet (4 mg) by mouth every 6 hours as needed for nausea 40 tablet 1     pantoprazole (PROTONIX) 40 MG EC tablet Take 1 tablet (40 mg) by mouth daily 90 tablet 3     pioglitazone (ACTOS) 30 MG tablet Take 1 tablet (30 mg) by mouth daily 90 tablet 3     pravastatin (PRAVACHOL) 40 MG tablet Take 1 tablet (40 mg) by mouth daily 90 tablet 3     tolterodine ER (DETROL LA) 4 MG 24 hr capsule TAKE ONE CAPSULE BY MOUTH ONCE DAILY 90 capsule 3       Social History     Tobacco Use     Smoking status: Former Smoker     Smokeless tobacco: Never Used     Tobacco comment: quit 1995   Substance Use Topics     Alcohol use: No     Drug use: No        ROS:  General: no fever, chills  Weight: increased  Vision:negative. Last eye exam 2/26/21.  ENT: negative  Respiratory negative.  Cardiac: no chest pain or pressure  Abdominal: no nausea, vomiting, abdominal pain, bowel changes  Vascular no complaints of claudication  Neurologic:no complaints of neuropathy  Feet no lesions, in grown nails,  : no polyuria, hematuria, dysuria    Objective:  Patient alert in NAD  /72   Pulse 95   Temp 98.1  F (36.7  C) (Oral)   Resp 20   Ht 1.505 m (4' 11.25\")   Wt 88.5 kg (195 lb)   SpO2 97%   BMI 39.05 kg/m         Wt Readings from Last 4 Encounters:   06/21/21 88.5 kg (195 lb)   12/01/20 84.2 kg (185 lb 9.6 oz)   10/14/19 83 kg (183 lb)   07/01/19 83.1 kg (183 lb 3.2 oz)       CV: CV: normal S1, S2 without murmur, S3 or S4.  Carotid pulses: full  LUNGS: clear  No significant back tenderness.  No tenderness of the hip joints with palpation or movement  No tenderness of the knee joints  There is 1+ edema the ankles, right slightly more than left, no significant tenderness of the right " ankle joint, no significant pain with movement at the ankle joint.    Lab Results   Component Value Date    A1C 7.2 06/11/2021    A1C 6.6 11/30/2020    A1C 7.4 10/11/2019    A1C 7.3 04/15/2019    A1C 7.8 10/06/2018       Lab Results   Component Value Date    CHOL 146 11/30/2020    HDL 59 11/30/2020    LDL 71 11/30/2020    TRIG 82 11/30/2020    CHOLHDLRATIO 2.3 03/24/2015

## 2021-09-18 ENCOUNTER — HEALTH MAINTENANCE LETTER (OUTPATIENT)
Age: 81
End: 2021-09-18

## 2021-09-20 ENCOUNTER — OFFICE VISIT (OUTPATIENT)
Dept: NEUROSURGERY | Facility: CLINIC | Age: 81
End: 2021-09-20
Attending: INTERNAL MEDICINE
Payer: COMMERCIAL

## 2021-09-20 ENCOUNTER — TRANSFERRED RECORDS (OUTPATIENT)
Dept: HEALTH INFORMATION MANAGEMENT | Facility: CLINIC | Age: 81
End: 2021-09-20

## 2021-09-20 VITALS
SYSTOLIC BLOOD PRESSURE: 141 MMHG | BODY MASS INDEX: 39.61 KG/M2 | DIASTOLIC BLOOD PRESSURE: 83 MMHG | WEIGHT: 197.8 LBS | HEART RATE: 83 BPM | OXYGEN SATURATION: 96 %

## 2021-09-20 DIAGNOSIS — M54.50 BILATERAL LOW BACK PAIN WITHOUT SCIATICA, UNSPECIFIED CHRONICITY: ICD-10-CM

## 2021-09-20 PROCEDURE — 99203 OFFICE O/P NEW LOW 30 MIN: CPT | Performed by: PHYSICIAN ASSISTANT

## 2021-09-20 PROCEDURE — G0463 HOSPITAL OUTPT CLINIC VISIT: HCPCS

## 2021-09-20 ASSESSMENT — PAIN SCALES - GENERAL: PAINLEVEL: MODERATE PAIN (5)

## 2021-09-20 NOTE — PROGRESS NOTES
NEUROSURGERY CLINIC CONSULT NOTE     DATE OF VISIT: 9/20/2021     SUBJECTIVE:     Janay Kyle is a pleasant 80 year old female who presents to the clinic today for consultation on lumbar spine pain. She is referred to the Neurosurgery Clinic by Dr. Rai in Primary Care. Pertinent medical history consists of diabetes.   Today, she reports a multi-year history of symptoms. She describes constant, sharp, aching, burning pain that initiates in the midline low lumbar region and intermittently radiates distally in no specific distribution. This pain is accompanied by paresthesia and cramping. Prolonged walking and standing seems to  aggravate the symptoms, while alleviation is obtained by positional changes. No mechanism of injury such as trauma or a fall is associated with the onset of the pain. There are no bowel or bladder changes. She denies saddle anesthesia. She denies changes in gait, instability, or falling episodes. She has not participated in conservative therapies.          Current Outpatient Medications:      Acetaminophen (TYLENOL) 325 MG CAPS, Take 325-650 mg by mouth every 4 hours as needed, Disp: , Rfl:      Ascorbic Acid (VITAMIN C CR) 1000 MG TBCR, Take 1 tablet by mouth daily , Disp: , Rfl:      aspirin 81 MG tablet, Take 1 tablet (81 mg) by mouth daily, Disp: , Rfl:      blood glucose (NO BRAND SPECIFIED) lancets standard, Use to test blood sugar 1 times daily or as directed., Disp: 100 lancet, Rfl: 3     blood glucose (NO BRAND SPECIFIED) test strip, Use to test blood sugars 1 times daily or as directed, Disp: 100 strip, Rfl: 3     Calcium Citrate-Vitamin D (CALCIUM CITRATE + D) 315-200 MG-UNIT TABS, Take 1 tablet by mouth 2 times daily., Disp: , Rfl:      glimepiride (AMARYL) 4 MG tablet, Take 2 tablets (8 mg) by mouth every morning (before breakfast), Disp: 180 tablet, Rfl: 3     metoprolol succinate ER (TOPROL-XL) 50 MG 24 hr tablet, Take 2 tablets (100 mg) by mouth daily, Disp: 180 tablet, Rfl:  3     Multiple Vitamin (DAILY MULTIVITAMIN PO), Take 1 tablet by mouth daily., Disp: , Rfl:      pantoprazole (PROTONIX) 40 MG EC tablet, Take 1 tablet (40 mg) by mouth daily, Disp: 90 tablet, Rfl: 3     pioglitazone (ACTOS) 30 MG tablet, Take 1 tablet (30 mg) by mouth daily, Disp: 90 tablet, Rfl: 3     pravastatin (PRAVACHOL) 40 MG tablet, Take 1 tablet (40 mg) by mouth daily, Disp: 90 tablet, Rfl: 3     tolterodine ER (DETROL LA) 4 MG 24 hr capsule, TAKE ONE CAPSULE BY MOUTH ONCE DAILY, Disp: 90 capsule, Rfl: 3     benazepril-hydrochlorothiazide (LOTENSIN HCT) 20-25 MG tablet, Take 1 tablet by mouth daily, Disp: 90 tablet, Rfl: 3     diazepam (VALIUM) 5 MG tablet, Take 0.5-1 tablets (2.5-5 mg) by mouth every 8 hours as needed (vertigo), Disp: 20 tablet, Rfl: 0     meclizine (ANTIVERT) 25 MG tablet, Take 1 tablet (25 mg) by mouth 3 times daily as needed for dizziness, Disp: 30 tablet, Rfl: 1     ondansetron (ZOFRAN) 4 MG tablet, Take 1 tablet (4 mg) by mouth every 6 hours as needed for nausea, Disp: 40 tablet, Rfl: 1     Allergies   Allergen Reactions     Omeprazole Rash        Past Medical History:   Diagnosis Date     Arthritis      CKD (chronic kidney disease) stage 3, GFR 30-59 ml/min      Diverticulitis 6/09    seen [facility]     Esophageal reflux      Essential hypertension, benign      Headache(784.0)      History of blood transfusion 1982 1982     Murmur, cardiac     neg echo     Other and unspecified hyperlipidemia      Other chronic pain     low back     Type II or unspecified type diabetes mellitus without mention of complication, not stated as uncontrolled 1997        ROS: 10 point review of symptoms are negative other than the symptoms noted above in the HPI.     Family History has been reviewed with the patient, there are no pertinent findings to presenting concern.     Past Surgical History:   Procedure Laterality Date     CATARACT IOL, RT/LT  2009    right cataract     HYSTERECTOMY  1982      LAPAROSCOPIC CHOLECYSTECTOMY N/A 6/2/2015    Procedure: LAPAROSCOPIC CHOLECYSTECTOMY;  Surgeon: Louis Ceballos MD;  Location:  OR     [facility] NONSPECIFIC PROCEDURE  1983    ANNITA/BSO--fibroid           Social History     Tobacco Use     Smoking status: Former Smoker     Smokeless tobacco: Never Used     Tobacco comment: quit 1995   Substance Use Topics     Alcohol use: No     Drug use: No        OBJECTIVE:   BP (!) 141/83   Pulse 83   Wt 197 lb 12.8 oz (89.7 kg)   SpO2 96%   BMI 39.61 kg/m     Body mass index is 39.61 kg/m .     Imaging:     XR LUMBAR SPINE TWO-THREE VIEWS  6/21/2021 3:06 PM     Findings, per radiologist read, notable for:      Five lumbar-type vertebrae. Minimal degenerative anterolisthesis of L5 upon S1. Alignment otherwise normal. Vertebral body heights normal. No fractures. Degenerative endplate spurring at L2-L3 and facet arthropathy at L4-L5 and L5-S1.    Full radiological report in chart. Imaging was reviewed with with patient today.     Exam:     Patient appears comfortable, conversational, and in no apparent distress.   Head: Normocephalic, without obvious abnormality, atraumatic, no facial asymmetry.   Eyes: conjunctivae/corneas clear. PERRL, EOM's intact.   Throat: lips, mucosa, and tongue normal; teeth and gums normal.   Neck: supple, symmetrical, trachea midline, no adenopathy and thyroid: not enlarged, symmetric, no tenderness/mass/nodules.   Lungs: clear to auscultation bilaterally.   Heart: regular rate and rhythm.   Abdomen: soft, non-tender; bowel sounds normal; no masses, no organomegaly.   Pulses: 2+ and symmetric.   Skin: Skin color, texture, turgor normal. No rashes or lesions.     CN II-XII grossly intact, alert and appropriate with conversation and following commands.   Gait is non-antalgic. Able to tandem walk. Able to walk on toes and heels without difficulty.   Cervical spine is non tender to palpation. Appropriate range of motion of neck, not concerning for  lhermitte's phenomenon.   Bilateral bicep 2/4 and tricep reflexes 1/4. Sensation intact throughout upper extremities.     UE muscle strength  Right  Left    Deltoid  5/5  5/5    Biceps  5/5  5/5    Triceps  5/5  5/5    Hand intrinsics  5/5  5/5    Hand grasp  5/5  5/5    Neely signs  neg  neg      Lumbar spine is non tender to palpation.  Intact sensation throughout lower extremities.   Bilateral patellar 2/4 and achilles reflex 1/4. Negative for pain with straight leg raise.     LE muscle strength  Right  Left    Iliopsoas (hip flexion)  5/5  5/5    Quad (knee extension)  5/5  5/5    Hamstring (knee flexion)  5/5  5/5    Gastrocnemius (PF)  5/5  5/5    Tibialis Ant. (DF)  5/5  5/5    EHL  5/5  5/5      Negative Babinski bilaterally. Negative for clonus.   Calves are soft and non-tender bilaterally.     ASSESSMENT/PLAN:     Janay Kyle is a 80 year old female who presents to the clinic for consultation on a multi-year history of a constant, sharp, aching, burning pain that initiates in the midline low lumbar region and intermittently radiates distally in no specific distribution. This pain is accompanied by paresthesia and cramping. The patient's most recent imaging was reviewed with her today. It was explained that images show minimal degenerative anterolisthesis of L5 upon S1 and degenerative endplate spurring at L2-L3 and facet arthropathy at L4-L5 and L5-S1. On exam, she is noted to have appropriate strength, sensation and range of motion. She has not attempted conservative management.     Based on her physical exam, imaging review and past treatments, we feel that it would be in Ms. Kyle's best interest to try a conservative approach by participating in a program initiated by our colleagues in physical therapy. She did inquire about possible treatment options that they may consider so we briefly discussed core stretching and strengthening exercises.     We also discussed the possibility of obtaining an  epidural steroid injection, but this will cause an elevation in her blood glucose level and she is diabetic.    We would like to see her back as needed to further discuss other conservative options or possible surgical interventions. She is leaving for [place] for winter.     Should she fail to obtain adequate alleviation of her symptoms utilizing the above measures, a lumbar MRI WO should be considered.     We did review the images together and we explained her condition and the recommended treatment. We also discussed signs of a worsening problem that she should seek being evaluated.          Respectfully,     JESUS Capellan, PA-ROBINSON  [facility] Neurosurgery  [facility]  Tel: [phone]      Exam, imaging, and plan reviewed by Dr. Carter.

## 2021-09-20 NOTE — NURSING NOTE
"Janay Kyle is a 80 year old female who presents for:  Chief Complaint   Patient presents with     Neurologic Problem     LBP w/o sciatica     Consult        Initial Vitals:  BP (!) 141/83   Pulse 83   Wt 197 lb 12.8 oz (89.7 kg)   SpO2 96%   BMI 39.61 kg/m   Estimated body mass index is 39.61 kg/m  as calculated from the following:    Height as of 6/21/21: 4' 11.25\" (1.505 m).    Weight as of this encounter: 197 lb 12.8 oz (89.7 kg).. Body surface area is 1.94 meters squared. BP completed using cuff size: regular  Moderate Pain (5)    Yanick Ryan MA    "

## 2021-09-20 NOTE — LETTER
9/20/2021         RE: Janay Kyle  [address] [address]        Dear Colleague,    Thank you for referring your patient, Janay Kyle, to the [facility]OS[facility] [place]. Please see a copy of my visit note below.    NEUROSURGERY CLINIC CONSULT NOTE     DATE OF VISIT: 9/20/2021     SUBJECTIVE:     Janay Kyle is a pleasant 80 year old female who presents to the clinic today for consultation on lumbar spine pain. She is referred to the Neurosurgery Clinic by Dr. Rai in Primary Care. Pertinent medical history consists of diabetes.   Today, she reports a multi-year history of symptoms. She describes constant, sharp, aching, burning pain that initiates in the midline low lumbar region and intermittently radiates distally in no specific distribution. This pain is accompanied by paresthesia and cramping. Prolonged walking and standing seems to  aggravate the symptoms, while alleviation is obtained by positional changes. No mechanism of injury such as trauma or a fall is associated with the onset of the pain. There are no bowel or bladder changes. She denies saddle anesthesia. She denies changes in gait, instability, or falling episodes. She has not participated in conservative therapies.          Current Outpatient Medications:      Acetaminophen (TYLENOL) 325 MG CAPS, Take 325-650 mg by mouth every 4 hours as needed, Disp: , Rfl:      Ascorbic Acid (VITAMIN C CR) 1000 MG TBCR, Take 1 tablet by mouth daily , Disp: , Rfl:      aspirin 81 MG tablet, Take 1 tablet (81 mg) by mouth daily, Disp: , Rfl:      blood glucose (NO BRAND SPECIFIED) lancets standard, Use to test blood sugar 1 times daily or as directed., Disp: 100 lancet, Rfl: 3     blood glucose (NO BRAND SPECIFIED) test strip, Use to test blood sugars 1 times daily or as directed, Disp: 100 strip, Rfl: 3     Calcium Citrate-Vitamin D (CALCIUM CITRATE + D) 315-200 MG-UNIT TABS, Take 1 tablet by mouth 2  times daily., Disp: , Rfl:      glimepiride (AMARYL) 4 MG tablet, Take 2 tablets (8 mg) by mouth every morning (before breakfast), Disp: 180 tablet, Rfl: 3     metoprolol succinate ER (TOPROL-XL) 50 MG 24 hr tablet, Take 2 tablets (100 mg) by mouth daily, Disp: 180 tablet, Rfl: 3     Multiple Vitamin (DAILY MULTIVITAMIN PO), Take 1 tablet by mouth daily., Disp: , Rfl:      pantoprazole (PROTONIX) 40 MG EC tablet, Take 1 tablet (40 mg) by mouth daily, Disp: 90 tablet, Rfl: 3     pioglitazone (ACTOS) 30 MG tablet, Take 1 tablet (30 mg) by mouth daily, Disp: 90 tablet, Rfl: 3     pravastatin (PRAVACHOL) 40 MG tablet, Take 1 tablet (40 mg) by mouth daily, Disp: 90 tablet, Rfl: 3     tolterodine ER (DETROL LA) 4 MG 24 hr capsule, TAKE ONE CAPSULE BY MOUTH ONCE DAILY, Disp: 90 capsule, Rfl: 3     benazepril-hydrochlorothiazide (LOTENSIN HCT) 20-25 MG tablet, Take 1 tablet by mouth daily, Disp: 90 tablet, Rfl: 3     diazepam (VALIUM) 5 MG tablet, Take 0.5-1 tablets (2.5-5 mg) by mouth every 8 hours as needed (vertigo), Disp: 20 tablet, Rfl: 0     meclizine (ANTIVERT) 25 MG tablet, Take 1 tablet (25 mg) by mouth 3 times daily as needed for dizziness, Disp: 30 tablet, Rfl: 1     ondansetron (ZOFRAN) 4 MG tablet, Take 1 tablet (4 mg) by mouth every 6 hours as needed for nausea, Disp: 40 tablet, Rfl: 1     Allergies   Allergen Reactions     Omeprazole Rash        Past Medical History:   Diagnosis Date     Arthritis      CKD (chronic kidney disease) stage 3, GFR 30-59 ml/min      Diverticulitis 6/09    seen [facility]     Esophageal reflux      Essential hypertension, benign      Headache(784.0)      History of blood transfusion 1982 1982     Murmur, cardiac     neg echo     Other and unspecified hyperlipidemia      Other chronic pain     low back     Type II or unspecified type diabetes mellitus without mention of complication, not stated as uncontrolled 1997        ROS: 10 point review of symptoms are negative other than  the symptoms noted above in the HPI.     Family History has been reviewed with the patient, there are no pertinent findings to presenting concern.     Past Surgical History:   Procedure Laterality Date     CATARACT IOL, RT/LT  2009    right cataract     HYSTERECTOMY  1982     LAPAROSCOPIC CHOLECYSTECTOMY N/A 6/2/2015    Procedure: LAPAROSCOPIC CHOLECYSTECTOMY;  Surgeon: Louis Ceballos MD;  Location:  OR     [facility] NONSPECIFIC PROCEDURE  1983    ANNITA/BSO--fibroid           Social History     Tobacco Use     Smoking status: Former Smoker     Smokeless tobacco: Never Used     Tobacco comment: quit 1995   Substance Use Topics     Alcohol use: No     Drug use: No        OBJECTIVE:   BP (!) 141/83   Pulse 83   Wt 197 lb 12.8 oz (89.7 kg)   SpO2 96%   BMI 39.61 kg/m     Body mass index is 39.61 kg/m .     Imaging:     XR LUMBAR SPINE TWO-THREE VIEWS  6/21/2021 3:06 PM     Findings, per radiologist read, notable for:      Five lumbar-type vertebrae. Minimal degenerative anterolisthesis of L5 upon S1. Alignment otherwise normal. Vertebral body heights normal. No fractures. Degenerative endplate spurring at L2-L3 and facet arthropathy at L4-L5 and L5-S1.    Full radiological report in chart. Imaging was reviewed with with patient today.     Exam:     Patient appears comfortable, conversational, and in no apparent distress.   Head: Normocephalic, without obvious abnormality, atraumatic, no facial asymmetry.   Eyes: conjunctivae/corneas clear. PERRL, EOM's intact.   Throat: lips, mucosa, and tongue normal; teeth and gums normal.   Neck: supple, symmetrical, trachea midline, no adenopathy and thyroid: not enlarged, symmetric, no tenderness/mass/nodules.   Lungs: clear to auscultation bilaterally.   Heart: regular rate and rhythm.   Abdomen: soft, non-tender; bowel sounds normal; no masses, no organomegaly.   Pulses: 2+ and symmetric.   Skin: Skin color, texture, turgor normal. No rashes or lesions.     CN II-XII  grossly intact, alert and appropriate with conversation and following commands.   Gait is non-antalgic. Able to tandem walk. Able to walk on toes and heels without difficulty.   Cervical spine is non tender to palpation. Appropriate range of motion of neck, not concerning for lhermitte's phenomenon.   Bilateral bicep 2/4 and tricep reflexes 1/4. Sensation intact throughout upper extremities.     UE muscle strength  Right  Left    Deltoid  5/5  5/5    Biceps  5/5  5/5    Triceps  5/5  5/5    Hand intrinsics  5/5  5/5    Hand grasp  5/5  5/5    Neely signs  neg  neg      Lumbar spine is non tender to palpation.  Intact sensation throughout lower extremities.   Bilateral patellar 2/4 and achilles reflex 1/4. Negative for pain with straight leg raise.     LE muscle strength  Right  Left    Iliopsoas (hip flexion)  5/5  5/5    Quad (knee extension)  5/5  5/5    Hamstring (knee flexion)  5/5  5/5    Gastrocnemius (PF)  5/5  5/5    Tibialis Ant. (DF)  5/5  5/5    EHL  5/5  5/5      Negative Babinski bilaterally. Negative for clonus.   Calves are soft and non-tender bilaterally.     ASSESSMENT/PLAN:     Janay Kyle is a 80 year old female who presents to the clinic for consultation on a multi-year history of a constant, sharp, aching, burning pain that initiates in the midline low lumbar region and intermittently radiates distally in no specific distribution. This pain is accompanied by paresthesia and cramping. The patient's most recent imaging was reviewed with her today. It was explained that images show minimal degenerative anterolisthesis of L5 upon S1 and degenerative endplate spurring at L2-L3 and facet arthropathy at L4-L5 and L5-S1. On exam, she is noted to have appropriate strength, sensation and range of motion. She has not attempted conservative management.     Based on her physical exam, imaging review and past treatments, we feel that it would be in Ms. Kyle's best interest to try a conservative approach  by participating in a program initiated by our colleagues in physical therapy. She did inquire about possible treatment options that they may consider so we briefly discussed core stretching and strengthening exercises.     We also discussed the possibility of obtaining an epidural steroid injection, but this will cause an elevation in her blood glucose level and she is diabetic.    We would like to see her back as needed to further discuss other conservative options or possible surgical interventions. She is leaving for [place] for winter.     Should she fail to obtain adequate alleviation of her symptoms utilizing the above measures, a lumbar MRI WO should be considered.     We did review the images together and we explained her condition and the recommended treatment. We also discussed signs of a worsening problem that she should seek being evaluated.          Respectfully,     JESUS Capellan, BRUCE  [facility] Neurosurgery  [facility]  Tel: [phone]      Exam, imaging, and plan reviewed by Dr. Carter.       Again, thank you for allowing me to participate in the care of your patient.        Sincerely,        Torrie Stanford PA-C

## 2021-10-26 ENCOUNTER — TELEPHONE (OUTPATIENT)
Dept: INTERNAL MEDICINE | Facility: CLINIC | Age: 81
End: 2021-10-26

## 2021-10-26 NOTE — TELEPHONE ENCOUNTER
Left voice message for patient advising Dr. Rai had placed lab orders on 6/21/21 that Shabana states can be used for the appointment with her. Advised patient to schedule a lab only appointment to have labs drawn as ordered by Dr. Rai, will need to fast for lab appointment.    Consuelo Lino RN  [facility]

## 2021-10-26 NOTE — TELEPHONE ENCOUNTER
Patient leaving out of country and has a video appt with Lakshmi. Could not get in with Fredi. Patient needs diabetic lab orders placed for 11/11/2021 visit  Ok to call and lm [phone]

## 2021-11-04 ENCOUNTER — IMMUNIZATION (OUTPATIENT)
Dept: FAMILY MEDICINE | Facility: CLINIC | Age: 81
End: 2021-11-04
Payer: COMMERCIAL

## 2021-11-04 ENCOUNTER — LAB (OUTPATIENT)
Dept: LAB | Facility: CLINIC | Age: 81
End: 2021-11-04

## 2021-11-04 DIAGNOSIS — I10 BENIGN ESSENTIAL HYPERTENSION: ICD-10-CM

## 2021-11-04 DIAGNOSIS — N18.31 STAGE 3A CHRONIC KIDNEY DISEASE (H): ICD-10-CM

## 2021-11-04 DIAGNOSIS — E11.22 TYPE 2 DIABETES MELLITUS WITH STAGE 3A CHRONIC KIDNEY DISEASE, WITHOUT LONG-TERM CURRENT USE OF INSULIN (H): ICD-10-CM

## 2021-11-04 DIAGNOSIS — N18.31 TYPE 2 DIABETES MELLITUS WITH STAGE 3A CHRONIC KIDNEY DISEASE, WITHOUT LONG-TERM CURRENT USE OF INSULIN (H): ICD-10-CM

## 2021-11-04 DIAGNOSIS — E78.5 HYPERLIPIDEMIA LDL GOAL <100: ICD-10-CM

## 2021-11-04 LAB
ANION GAP SERPL CALCULATED.3IONS-SCNC: 11 MMOL/L (ref 5–18)
BUN SERPL-MCNC: 18 MG/DL (ref 8–28)
CALCIUM SERPL-MCNC: 9.9 MG/DL (ref 8.5–10.5)
CHLORIDE BLD-SCNC: 103 MMOL/L (ref 98–107)
CHOLEST SERPL-MCNC: 212 MG/DL
CO2 SERPL-SCNC: 25 MMOL/L (ref 22–31)
CREAT SERPL-MCNC: 1.24 MG/DL (ref 0.6–1.1)
CREAT UR-MCNC: 113 MG/DL
FASTING STATUS PATIENT QL REPORTED: YES
GFR SERPL CREATININE-BSD FRML MDRD: 41 ML/MIN/1.73M2
GLUCOSE BLD-MCNC: 126 MG/DL (ref 70–125)
HBA1C MFR BLD: 8 % (ref 0–5.6)
HDLC SERPL-MCNC: 52 MG/DL
LDLC SERPL CALC-MCNC: 135 MG/DL
MICROALBUMIN UR-MCNC: 1.19 MG/DL (ref 0–1.99)
MICROALBUMIN/CREAT UR: 10.5 MG/G CR
POTASSIUM BLD-SCNC: 4.2 MMOL/L (ref 3.5–5)
SODIUM SERPL-SCNC: 139 MMOL/L (ref 136–145)
TRIGL SERPL-MCNC: 126 MG/DL

## 2021-11-04 PROCEDURE — 90662 IIV NO PRSV INCREASED AG IM: CPT

## 2021-11-04 PROCEDURE — 80061 LIPID PANEL: CPT

## 2021-11-04 PROCEDURE — G0008 ADMIN INFLUENZA VIRUS VAC: HCPCS

## 2021-11-04 PROCEDURE — 83036 HEMOGLOBIN GLYCOSYLATED A1C: CPT

## 2021-11-04 PROCEDURE — 36415 COLL VENOUS BLD VENIPUNCTURE: CPT

## 2021-11-04 PROCEDURE — 82043 UR ALBUMIN QUANTITATIVE: CPT

## 2021-11-04 PROCEDURE — 80048 BASIC METABOLIC PNL TOTAL CA: CPT

## 2021-11-11 ENCOUNTER — VIRTUAL VISIT (OUTPATIENT)
Dept: INTERNAL MEDICINE | Facility: CLINIC | Age: 81
End: 2021-11-11
Payer: COMMERCIAL

## 2021-11-11 ENCOUNTER — TELEPHONE (OUTPATIENT)
Dept: INTERNAL MEDICINE | Facility: CLINIC | Age: 81
End: 2021-11-11

## 2021-11-11 ENCOUNTER — LAB (OUTPATIENT)
Dept: LAB | Facility: CLINIC | Age: 81
End: 2021-11-11
Attending: NURSE PRACTITIONER
Payer: COMMERCIAL

## 2021-11-11 DIAGNOSIS — R35.0 URINARY FREQUENCY: ICD-10-CM

## 2021-11-11 DIAGNOSIS — K21.9 GASTROESOPHAGEAL REFLUX DISEASE WITHOUT ESOPHAGITIS: ICD-10-CM

## 2021-11-11 DIAGNOSIS — R79.89 ELEVATED SERUM CREATININE: ICD-10-CM

## 2021-11-11 DIAGNOSIS — I10 BENIGN ESSENTIAL HYPERTENSION: ICD-10-CM

## 2021-11-11 DIAGNOSIS — R42 VERTIGO: ICD-10-CM

## 2021-11-11 DIAGNOSIS — N18.31 TYPE 2 DIABETES MELLITUS WITH STAGE 3A CHRONIC KIDNEY DISEASE, WITHOUT LONG-TERM CURRENT USE OF INSULIN (H): Primary | ICD-10-CM

## 2021-11-11 DIAGNOSIS — E11.22 TYPE 2 DIABETES MELLITUS WITH STAGE 3A CHRONIC KIDNEY DISEASE, WITHOUT LONG-TERM CURRENT USE OF INSULIN (H): Primary | ICD-10-CM

## 2021-11-11 LAB
ANION GAP SERPL CALCULATED.3IONS-SCNC: 7 MMOL/L (ref 3–14)
BUN SERPL-MCNC: 27 MG/DL (ref 7–30)
CALCIUM SERPL-MCNC: 9.8 MG/DL (ref 8.5–10.1)
CHLORIDE BLD-SCNC: 101 MMOL/L (ref 94–109)
CO2 SERPL-SCNC: 31 MMOL/L (ref 20–32)
CREAT SERPL-MCNC: 1.19 MG/DL (ref 0.52–1.04)
GFR SERPL CREATININE-BSD FRML MDRD: 44 ML/MIN/1.73M2
GLUCOSE BLD-MCNC: 162 MG/DL (ref 70–99)
POTASSIUM BLD-SCNC: 3.9 MMOL/L (ref 3.4–5.3)
SODIUM SERPL-SCNC: 139 MMOL/L (ref 133–144)

## 2021-11-11 PROCEDURE — 80048 BASIC METABOLIC PNL TOTAL CA: CPT | Performed by: PATHOLOGY

## 2021-11-11 PROCEDURE — 36415 COLL VENOUS BLD VENIPUNCTURE: CPT | Performed by: PATHOLOGY

## 2021-11-11 PROCEDURE — 99215 OFFICE O/P EST HI 40 MIN: CPT | Mod: 95 | Performed by: NURSE PRACTITIONER

## 2021-11-11 RX ORDER — METOPROLOL SUCCINATE 50 MG/1
100 TABLET, EXTENDED RELEASE ORAL DAILY
Qty: 180 TABLET | Refills: 3 | Status: SHIPPED | OUTPATIENT
Start: 2021-11-11 | End: 2022-11-12

## 2021-11-11 RX ORDER — PIOGLITAZONEHYDROCHLORIDE 45 MG/1
45 TABLET ORAL DAILY
Qty: 90 TABLET | Refills: 3 | Status: SHIPPED | OUTPATIENT
Start: 2021-11-11 | End: 2022-11-12

## 2021-11-11 RX ORDER — DIAZEPAM 5 MG
2.5-5 TABLET ORAL EVERY 8 HOURS PRN
Qty: 20 TABLET | Refills: 0 | Status: SHIPPED | OUTPATIENT
Start: 2021-11-11 | End: 2022-11-11

## 2021-11-11 RX ORDER — PANTOPRAZOLE SODIUM 40 MG/1
40 TABLET, DELAYED RELEASE ORAL DAILY
Qty: 90 TABLET | Refills: 3 | Status: SHIPPED | OUTPATIENT
Start: 2021-11-11 | End: 2022-11-12

## 2021-11-11 RX ORDER — TOLTERODINE 4 MG/1
CAPSULE, EXTENDED RELEASE ORAL
Qty: 90 CAPSULE | Refills: 3 | Status: SHIPPED | OUTPATIENT
Start: 2021-11-11 | End: 2022-11-12

## 2021-11-11 RX ORDER — GLIMEPIRIDE 4 MG/1
8 TABLET ORAL
Qty: 180 TABLET | Refills: 3 | Status: SHIPPED | OUTPATIENT
Start: 2021-11-11 | End: 2022-11-12

## 2021-11-11 NOTE — PROGRESS NOTES
Janay is a 80 year old who is being evaluated via a billable video visit.      How would you like to obtain your AVS? Bella  If the video visit is dropped, the invitation should be resent by: bella  Will anyone else be joining your video visit? Sue her daughter in law    Video Start Time: 1130    Assessment & Plan     Type 2 diabetes mellitus with stage 3a chronic kidney disease, without long-term current use of insulin (H)  a1c elevated   Has creeped up 6-7 8 over past year   Need to increase medication   - pioglitazone (ACTOS) 45 MG tablet; Take 1 tablet (45 mg) by mouth daily  - glimepiride (AMARYL) 4 MG tablet; Take 2 tablets (8 mg) by mouth every morning (before breakfast)    Elevated serum creatinine  Needs recheck well hydrated   - Basic metabolic panel  (Ca, Cl, CO2, Creat, Gluc, K, Na, BUN); Future  - pioglitazone (ACTOS) 45 MG tablet; Take 1 tablet (45 mg) by mouth daily    Urinary frequency  Helps   - tolterodine ER (DETROL LA) 4 MG 24 hr capsule; TAKE ONE CAPSULE BY MOUTH ONCE DAILY    Gastroesophageal reflux disease without esophagitis  Medication working   - pantoprazole (PROTONIX) 40 MG EC tablet; Take 1 tablet (40 mg) by mouth daily    Benign essential hypertension  Has been ok range current medication   - metoprolol succinate ER (TOPROL-XL) 50 MG 24 hr tablet; Take 2 tablets (100 mg) by mouth daily    Vertigo  Uses in severe episodes and works well   Wants to have for [place] in case   - diazepam (VALIUM) 5 MG tablet; Take 0.5-1 tablets (2.5-5 mg) by mouth every 8 hours as needed (vertigo)      40 minutes spent on the date of the encounter doing chart review, history and exam, documentation and further activities per the note       Patient Instructions   BMP today well hydrated     ACTOS 45 mg daily for glucose     Make appointment on return to see Dr Rai       Return in about 6 months (around 5/11/2022).    [facility]    Subjective    Janay is a 80 year old who presents for the following health issues     HPI     Diabetes Follow-up    How often are you checking your blood sugar? One time daily  What time of day are you checking your blood sugars (select all that apply)?  Before meals  Have you had any blood sugars above 200?  No  Have you had any blood sugars below 70?  No    What symptoms do you notice when your blood sugar is low?  None    What concerns do you have today about your diabetes? Other:      Do you have any of these symptoms? (Select all that apply)  Numbness in feet and Burning in feet    Leaves 19 th for 6 months travel - [place]     Elevated creatnine - need to recheck well hydrated - she will try to do today     a1c 8 - will increase medication       Hyperlipidemia Follow-Up      Are you regularly taking any medication or supplement to lower your cholesterol?   Yes- pravastatin    Are you having muscle aches or other side effects that you think could be caused by your cholesterol lowering medication?  Yes- possibly    Hypertension Follow-up      Do you check your blood pressure regularly outside of the clinic? Yes     Are you following a low salt diet? No    Are your blood pressures ever more than 140 on the top number (systolic) OR more   than 90 on the bottom number (diastolic), for example 140/90? Yes    BP Readings from Last 2 Encounters:   09/20/21 (!) 141/83   06/21/21 128/72     Hemoglobin A1C (%)   Date Value   11/04/2021 8.0 (H)   06/11/2021 7.2 (H)   11/30/2020 6.6 (H)     LDL Cholesterol Calculated (mg/dL)   Date Value   11/04/2021 135 (H)   11/30/2020 71   04/15/2019 85         How many servings of fruits and vegetables do you eat daily?  4 or more    On average, how many sweetened beverages do you drink each day (Examples: soda, juice, sweet tea, etc.  Do NOT count diet or artificially sweetened beverages)?   0    How many days per week do you exercise enough to make your heart beat faster? 3 or less    How many  minutes a day do you exercise enough to make your heart beat faster? 9 or less    How many days per week do you miss taking your medication? 0        Review of Systems   Constitutional, HEENT, cardiovascular, pulmonary, GI, , musculoskeletal, neuro, skin, endocrine and psych systems are negative, except as otherwise noted.      Objective           Vitals:  No vitals were obtained today due to virtual visit.    Physical Exam   GENERAL: alert and no distress  EYES: Eyes grossly normal to inspection.  No discharge or erythema, or obvious scleral/conjunctival abnormalities.  RESP: No audible wheeze, cough, or visible cyanosis.  No visible retractions or increased work of breathing.    SKIN: Visible skin clear. No significant rash, abnormal pigmentation or lesions.  NEURO: Cranial nerves grossly intact.  Mentation and speech appropriate for age.  PSYCH: Mentation appears normal, affect normal/bright, judgement and insight intact, normal speech and appearance well-groomed.    BMP            Video-Visit Details    Type of service:  Video Visit    Video End Time:12:11 PM    Originating Location (pt. Location): Home    Distant Location (provider location):  [facility]     Platform used for Video Visit: [employer]

## 2021-11-11 NOTE — PATIENT INSTRUCTIONS
BMP today well hydrated     ACTOS 45 mg daily for glucose     Make appointment on return to see Dr Rai

## 2021-11-11 NOTE — NURSING NOTE
"Chief Complaint   Patient presents with     Recheck Medication     refills     initial There were no vitals taken for this visit. Estimated body mass index is 39.61 kg/m  as calculated from the following:    Height as of 6/21/21: 1.505 m (4' 11.25\").    Weight as of 9/20/21: 89.7 kg (197 lb 12.8 oz)..  bp completed using cuff size NA (Not Taken)  SABINE SAEED LPN  "

## 2021-11-12 NOTE — TELEPHONE ENCOUNTER
PRIOR AUTHORIZATION DENIED - COMPLETED VIA EPA     Medication: diazepam (VALIUM) 5 MG tablet - DENIED    Denial Date:  11/12/2021    Denial Rational: This medication is not FDA approved for your diagnosis; Vertigo [R42]      Appeal Information: [employer] - Fax: [phone]

## 2021-11-12 NOTE — TELEPHONE ENCOUNTER
Attempted to contact patient. Left voice message to call back.     Please advise patient insurance is not covering Diazepam. She could pay out of pocket for this.     Consuelo Lino RN  [facility]

## 2021-11-14 ENCOUNTER — MYC MEDICAL ADVICE (OUTPATIENT)
Dept: INTERNAL MEDICINE | Facility: CLINIC | Age: 81
End: 2021-11-14
Payer: COMMERCIAL

## 2021-11-14 DIAGNOSIS — I10 BENIGN ESSENTIAL HYPERTENSION: ICD-10-CM

## 2021-11-14 DIAGNOSIS — N18.31 STAGE 3A CHRONIC KIDNEY DISEASE (H): ICD-10-CM

## 2021-11-15 RX ORDER — BENAZEPRIL/HYDROCHLOROTHIAZIDE 20 MG-25MG
1 TABLET ORAL DAILY
Qty: 90 TABLET | Refills: 3 | Status: SHIPPED | OUTPATIENT
Start: 2021-11-15 | End: 2022-11-12

## 2021-11-15 NOTE — TELEPHONE ENCOUNTER
Previous order for Benazepril-hydrochlorothiazide 20-25 mg daily pended. Routed to PCP to review as Shabana is out of the office.    Consuelo Lino RN  [facility]

## 2021-11-15 NOTE — TELEPHONE ENCOUNTER
Patient's daughter in law Sue had erroneously told provider that patient no longer takes the Benazepril/HCTZ, needs med reordered ASAP so that she can call insurance to get a vacation override as patient will be gone for 5 months.  Patient never stopped or went without this med.  PAWAN Gan R.N.

## 2021-11-15 NOTE — TELEPHONE ENCOUNTER
Pt's daughter in law (on consent to communicate) calling to make sure we received the [employer] message attached. She stated that pt will be traveling to [place] on Friday and that the Benazepril/HCTZ requires a prior authorization. She would like a response ASAP. Please advise. Thanks.

## 2021-11-17 ENCOUNTER — TELEPHONE (OUTPATIENT)
Dept: INTERNAL MEDICINE | Facility: CLINIC | Age: 81
End: 2021-11-17
Payer: COMMERCIAL

## 2021-11-17 NOTE — TELEPHONE ENCOUNTER
Patient is leaving out of the country on Nov 19 2021 for 6 months and needs to take 6 months with of rx with her.      [facility] called us and tried to do a vacation override with [employer] and [employer] and was turned down.     They were told the Dr office needs to do a PA. The patient did give leila a case number which was not helpful in either call. The case number is #[id number]    Prior Authorization Retail Medication Request    Medication/Dose: lotensin, actos, detrol la, amaryl, toprolol    ICD code (if different than what is on RX):    Previously Tried and Failed:    Rationale:  Patient will be traveling and needs 6 months of meds    Insurance Name:  [employer] [phone] and [employer] [phone]  Insurance ID:  [id number]      Pharmacy Information (if different than what is on RX)  Name:  [facility]   Phone:  [phone]

## 2021-11-17 NOTE — TELEPHONE ENCOUNTER
PA Team is unable to obtain any vacation overrides. This needs to be handled at the pharmacy. If there is no vacation override available under their pharmacy benefit plan then patient will either have to pay out of pocket or have someone ship them their medications when they can be refilled again.

## 2022-01-08 ENCOUNTER — HEALTH MAINTENANCE LETTER (OUTPATIENT)
Age: 82
End: 2022-01-08

## 2022-01-12 NOTE — TELEPHONE ENCOUNTER
Patient experiencing muscle pain, requesting to try a different statin for cholesterol. Currently taking Pravastatin.     This request is forwarded to Dr Lee who covers Dr Lee patients with the letters M,N.   · Former smoker    · Without exacerbation   · CTA chest emphysematous changes   · Continue symbicort   · Added albuterol inhaler as needed, respiratory protocol

## 2022-05-12 ENCOUNTER — MYC MEDICAL ADVICE (OUTPATIENT)
Dept: INTERNAL MEDICINE | Facility: CLINIC | Age: 82
End: 2022-05-12
Payer: COMMERCIAL

## 2022-05-12 DIAGNOSIS — I10 BENIGN ESSENTIAL HYPERTENSION: ICD-10-CM

## 2022-05-12 DIAGNOSIS — N18.31 STAGE 3A CHRONIC KIDNEY DISEASE (H): ICD-10-CM

## 2022-05-12 DIAGNOSIS — E11.22 TYPE 2 DIABETES MELLITUS WITH STAGE 3A CHRONIC KIDNEY DISEASE, WITHOUT LONG-TERM CURRENT USE OF INSULIN (H): Primary | ICD-10-CM

## 2022-05-12 DIAGNOSIS — N18.31 TYPE 2 DIABETES MELLITUS WITH STAGE 3A CHRONIC KIDNEY DISEASE, WITHOUT LONG-TERM CURRENT USE OF INSULIN (H): Primary | ICD-10-CM

## 2022-05-12 DIAGNOSIS — Z13.9 SCREENING FOR CONDITION: ICD-10-CM

## 2022-05-13 NOTE — TELEPHONE ENCOUNTER
Please see myc message and advise.  Thanks    Labs pended.      Next 5 appointments (look out 90 days)    May 19, 2022 12:30 PM  (Arrive by 12:10 PM)  Provider Visit with KOJO Joseph CNP  [facility] ([facility] ) [address] [facility] [address]  [phone]

## 2022-05-19 ENCOUNTER — VIRTUAL VISIT (OUTPATIENT)
Dept: INTERNAL MEDICINE | Facility: CLINIC | Age: 82
End: 2022-05-19

## 2022-05-19 ENCOUNTER — LAB (OUTPATIENT)
Dept: LAB | Facility: CLINIC | Age: 82
End: 2022-05-19
Payer: COMMERCIAL

## 2022-05-19 DIAGNOSIS — I10 BENIGN ESSENTIAL HYPERTENSION: ICD-10-CM

## 2022-05-19 DIAGNOSIS — Z13.9 SCREENING FOR CONDITION: ICD-10-CM

## 2022-05-19 DIAGNOSIS — E11.9 TYPE 2 DIABETES, HBA1C GOAL < 8% (H): Primary | ICD-10-CM

## 2022-05-19 DIAGNOSIS — N18.31 STAGE 3A CHRONIC KIDNEY DISEASE (H): ICD-10-CM

## 2022-05-19 DIAGNOSIS — E78.5 HYPERLIPIDEMIA LDL GOAL <100: ICD-10-CM

## 2022-05-19 DIAGNOSIS — N18.31 TYPE 2 DIABETES MELLITUS WITH STAGE 3A CHRONIC KIDNEY DISEASE, WITHOUT LONG-TERM CURRENT USE OF INSULIN (H): ICD-10-CM

## 2022-05-19 DIAGNOSIS — E11.22 TYPE 2 DIABETES MELLITUS WITH STAGE 3A CHRONIC KIDNEY DISEASE, WITHOUT LONG-TERM CURRENT USE OF INSULIN (H): ICD-10-CM

## 2022-05-19 LAB
ANION GAP SERPL CALCULATED.3IONS-SCNC: 10 MMOL/L (ref 5–18)
BUN SERPL-MCNC: 23 MG/DL (ref 8–28)
CALCIUM SERPL-MCNC: 9.8 MG/DL (ref 8.5–10.5)
CHLORIDE BLD-SCNC: 101 MMOL/L (ref 98–107)
CO2 SERPL-SCNC: 29 MMOL/L (ref 22–31)
CREAT SERPL-MCNC: 1.29 MG/DL (ref 0.6–1.1)
GFR SERPL CREATININE-BSD FRML MDRD: 42 ML/MIN/1.73M2
GLUCOSE BLD-MCNC: 118 MG/DL (ref 70–125)
HBA1C MFR BLD: 7.5 % (ref 0–5.6)
HGB BLD-MCNC: 13.3 G/DL (ref 11.7–15.7)
POTASSIUM BLD-SCNC: 4.1 MMOL/L (ref 3.5–5)
SODIUM SERPL-SCNC: 140 MMOL/L (ref 136–145)
TSH SERPL DL<=0.005 MIU/L-ACNC: 2.26 UIU/ML (ref 0.3–5)

## 2022-05-19 PROCEDURE — 99213 OFFICE O/P EST LOW 20 MIN: CPT | Mod: 95 | Performed by: NURSE PRACTITIONER

## 2022-05-19 PROCEDURE — 80048 BASIC METABOLIC PNL TOTAL CA: CPT | Performed by: FAMILY MEDICINE

## 2022-05-19 PROCEDURE — 36415 COLL VENOUS BLD VENIPUNCTURE: CPT | Performed by: FAMILY MEDICINE

## 2022-05-19 PROCEDURE — 83036 HEMOGLOBIN GLYCOSYLATED A1C: CPT | Performed by: FAMILY MEDICINE

## 2022-05-19 PROCEDURE — 84443 ASSAY THYROID STIM HORMONE: CPT | Performed by: FAMILY MEDICINE

## 2022-05-19 PROCEDURE — 85018 HEMOGLOBIN: CPT | Performed by: FAMILY MEDICINE

## 2022-05-19 RX ORDER — PRAVASTATIN SODIUM 40 MG
40 TABLET ORAL DAILY
Qty: 90 TABLET | Refills: 3 | Status: SHIPPED | OUTPATIENT
Start: 2022-05-19 | End: 2022-11-11

## 2022-05-19 NOTE — PROGRESS NOTES
"Janay is a 81 year old who is being evaluated via a billable video visit.      How would you like to obtain your AVS? Mail a copy  If the video visit is dropped, the invitation should be resent by: Text to cell phone: [phone]  Will anyone else be joining your video visit? Yes: Daiughter in law Sue. How would they like to receive their invitation? Text to cell phone: [phone]    Video Start Time: 12:42 PM    Assessment & Plan     Type 2 diabetes, HbA1C goal < 8% (H)  She is 81 years old. I am happy with a1c less than 8   We increased medication last time and she is better     Benign essential hypertension  Stable     Stage 3a chronic kidney disease (H)  Lab pending     Hyperlipidemia LDL goal <100  Want to restart as her muscle aches did not change off of medication    - pravastatin (PRAVACHOL) 40 MG tablet; Take 1 tablet (40 mg) by mouth daily      20 minutes spent on the date of the encounter doing chart review, history and exam, documentation and further activities per the note       BMI:   Estimated body mass index is 39.61 kg/m  as calculated from the following:    Height as of 6/21/21: 1.505 m (4' 11.25\").    Weight as of 9/20/21: 89.7 kg (197 lb 12.8 oz).       Patient Instructions   Restart pravastatin     Return 6 months       Return in about 6 months (around 11/19/2022).    KOJO Joseph CNP  [facility] is a 81 year old who presents for the following health issues     HPI     Diabetes Follow-up    How often are you checking your blood sugar? One time daily  What time of day are you checking your blood sugars (select all that apply)?  Before meals  Have you had any blood sugars above 200?  No  Have you had any blood sugars below 70?  No    What symptoms do you notice when your blood sugar is low?  Weak    What concerns do you have today about your diabetes? None     Do you have any of these symptoms? (Select all that apply)  Numbness " in feet and Burning in feet    Have you had a diabetic eye exam in the last 12 months? Yes- Date of last eye exam: Spring 2021    Lab Results   Component Value Date    A1C 7.5 2022    A1C 8.0 2021    A1C 7.2 2021    A1C 6.6 2020    A1C 7.4 10/11/2019    A1C 7.3 04/15/2019    A1C 7.8 10/06/2018           Hyperlipidemia Follow-Up      Are you regularly taking any medication or supplement to lower your cholesterol?   Yes- Pravastatin    Are you having muscle aches or other side effects that you think could be caused by your cholesterol lowering medication?  Yes- Went off for a while because of aching muscle.    Hypertension Follow-up      Do you check your blood pressure regularly outside of the clinic? No     Are you following a low salt diet? No    Are your blood pressures ever more than 140 on the top number (systolic) OR more   than 90 on the bottom number (diastolic), for example 140/90? Yes    BP Readings from Last 2 Encounters:   21 (!) 141/83   21 128/72     Hemoglobin A1C POCT (%)   Date Value   2021 7.2 (H)   2020 6.6 (H)     Hemoglobin A1C (%)   Date Value   2022 7.5 (H)   2021 8.0 (H)     LDL Cholesterol Calculated (mg/dL)   Date Value   2021 135 (H)   2020 71   04/15/2019 85         How many servings of fruits and vegetables do you eat daily?  4 or more    On average, how many sweetened beverages do you drink each day (Examples: soda, juice, sweet tea, etc.  Do NOT count diet or artificially sweetened beverages)?   0    How many days per week do you exercise enough to make your heart beat faster? 3 or less    How many minutes a day do you exercise enough to make your heart beat faster? 9 or less    How many days per week do you miss taking your medication? 0    Stopped her medication for cholesterol for body aches     Going to [place] for a family emergency   Her brother      Review of Systems   Constitutional, HEENT,  cardiovascular, pulmonary, GI, , musculoskeletal, neuro, skin, endocrine and psych systems are negative, except as otherwise noted.      Objective           Vitals:  No vitals were obtained today due to virtual visit.    Physical Exam   GENERAL: alert and no distress- female relative who is a nurse in visit    EYES: Eyes grossly normal to inspection.  No discharge or erythema, or obvious scleral/conjunctival abnormalities.  RESP: No audible wheeze, cough, or visible cyanosis.  No visible retractions or increased work of breathing.    SKIN: Visible skin clear. No significant rash, abnormal pigmentation or lesions.  NEURO: Cranial nerves grossly intact.  Mentation and speech appropriate for age.  PSYCH: Mentation appears normal, affect normal/bright, judgement and insight intact, normal speech and appearance well-groomed.    lab            Video-Visit Details    Type of service:  Video Visit    Video End Time:12:59 PM    Originating Location (pt. Location): Home    Distant Location (provider location):  [facility]     Platform used for Video Visit: [employer]

## 2022-10-01 ENCOUNTER — TRANSFERRED RECORDS (OUTPATIENT)
Dept: HEALTH INFORMATION MANAGEMENT | Facility: CLINIC | Age: 82
End: 2022-10-01

## 2022-10-01 LAB — RETINOPATHY: NORMAL

## 2022-10-10 ENCOUNTER — TRANSFERRED RECORDS (OUTPATIENT)
Dept: HEALTH INFORMATION MANAGEMENT | Facility: CLINIC | Age: 82
End: 2022-10-10

## 2022-10-10 LAB — RETINOPATHY: NEGATIVE

## 2022-11-09 ENCOUNTER — MYC MEDICAL ADVICE (OUTPATIENT)
Dept: INTERNAL MEDICINE | Facility: CLINIC | Age: 82
End: 2022-11-09

## 2022-11-09 ENCOUNTER — LAB (OUTPATIENT)
Dept: LAB | Facility: CLINIC | Age: 82
End: 2022-11-09
Payer: COMMERCIAL

## 2022-11-09 DIAGNOSIS — I10 BENIGN ESSENTIAL HYPERTENSION: Primary | ICD-10-CM

## 2022-11-09 DIAGNOSIS — E11.22 TYPE 2 DIABETES MELLITUS WITH STAGE 3A CHRONIC KIDNEY DISEASE, WITHOUT LONG-TERM CURRENT USE OF INSULIN (H): ICD-10-CM

## 2022-11-09 DIAGNOSIS — N18.31 TYPE 2 DIABETES MELLITUS WITH STAGE 3A CHRONIC KIDNEY DISEASE, WITHOUT LONG-TERM CURRENT USE OF INSULIN (H): ICD-10-CM

## 2022-11-09 DIAGNOSIS — E78.5 HYPERLIPIDEMIA LDL GOAL <100: ICD-10-CM

## 2022-11-09 DIAGNOSIS — I10 BENIGN ESSENTIAL HYPERTENSION: ICD-10-CM

## 2022-11-09 LAB
ANION GAP SERPL CALCULATED.3IONS-SCNC: 18 MMOL/L (ref 7–15)
BUN SERPL-MCNC: 28.8 MG/DL (ref 8–23)
CALCIUM SERPL-MCNC: 9.7 MG/DL (ref 8.8–10.2)
CHLORIDE SERPL-SCNC: 102 MMOL/L (ref 98–107)
CHOLEST SERPL-MCNC: 194 MG/DL
CREAT SERPL-MCNC: 1.32 MG/DL (ref 0.51–0.95)
DEPRECATED HCO3 PLAS-SCNC: 18 MMOL/L (ref 22–29)
GFR SERPL CREATININE-BSD FRML MDRD: 41 ML/MIN/1.73M2
GLUCOSE SERPL-MCNC: 88 MG/DL (ref 70–99)
HBA1C MFR BLD: 7.2 % (ref 0–5.6)
HDLC SERPL-MCNC: 55 MG/DL
LDLC SERPL CALC-MCNC: 123 MG/DL
NONHDLC SERPL-MCNC: 139 MG/DL
POTASSIUM SERPL-SCNC: 4.4 MMOL/L (ref 3.4–5.3)
SODIUM SERPL-SCNC: 138 MMOL/L (ref 136–145)
TRIGL SERPL-MCNC: 79 MG/DL

## 2022-11-09 PROCEDURE — 80061 LIPID PANEL: CPT

## 2022-11-09 PROCEDURE — 36415 COLL VENOUS BLD VENIPUNCTURE: CPT

## 2022-11-09 PROCEDURE — 83036 HEMOGLOBIN GLYCOSYLATED A1C: CPT

## 2022-11-09 PROCEDURE — 80048 BASIC METABOLIC PNL TOTAL CA: CPT

## 2022-11-09 ASSESSMENT — ENCOUNTER SYMPTOMS
DIARRHEA: 0
PARESTHESIAS: 0
NERVOUS/ANXIOUS: 0
CHILLS: 0
WEAKNESS: 0
MYALGIAS: 1
HEMATURIA: 0
NAUSEA: 0
PALPITATIONS: 0
SORE THROAT: 0
FEVER: 0
COUGH: 0
DYSURIA: 0
BREAST MASS: 0
EYE PAIN: 0
CONSTIPATION: 0
JOINT SWELLING: 0
HEARTBURN: 0
ABDOMINAL PAIN: 0
SHORTNESS OF BREATH: 0
FREQUENCY: 0
HEMATOCHEZIA: 0
HEADACHES: 0
ARTHRALGIAS: 1

## 2022-11-09 ASSESSMENT — ACTIVITIES OF DAILY LIVING (ADL): CURRENT_FUNCTION: TRANSPORTATION REQUIRES ASSISTANCE

## 2022-11-09 NOTE — TELEPHONE ENCOUNTER
No result note or documentation in visits to indicate when patient needs labs or what labs are needed.  [place] lab still holding blood, please order anything needed at this time.  PAWAN Gan R.N.

## 2022-11-09 NOTE — TELEPHONE ENCOUNTER
PT has diabetic appt with Dr Rai on Friday.     Pended some lab orders.     Lab is holding some tubes.     No urine sample given.

## 2022-11-11 ENCOUNTER — OFFICE VISIT (OUTPATIENT)
Dept: INTERNAL MEDICINE | Facility: CLINIC | Age: 82
End: 2022-11-11
Payer: COMMERCIAL

## 2022-11-11 DIAGNOSIS — I10 BENIGN ESSENTIAL HYPERTENSION: ICD-10-CM

## 2022-11-11 DIAGNOSIS — R35.0 URINARY FREQUENCY: ICD-10-CM

## 2022-11-11 DIAGNOSIS — R42 VERTIGO: ICD-10-CM

## 2022-11-11 DIAGNOSIS — Z23 NEED FOR PROPHYLACTIC VACCINATION AND INOCULATION AGAINST INFLUENZA: ICD-10-CM

## 2022-11-11 DIAGNOSIS — E78.5 HYPERLIPIDEMIA LDL GOAL <100: ICD-10-CM

## 2022-11-11 DIAGNOSIS — E66.01 MORBID OBESITY (H): ICD-10-CM

## 2022-11-11 DIAGNOSIS — G89.29 CHRONIC RIGHT SHOULDER PAIN: ICD-10-CM

## 2022-11-11 DIAGNOSIS — N18.31 STAGE 3A CHRONIC KIDNEY DISEASE (H): ICD-10-CM

## 2022-11-11 DIAGNOSIS — N18.32 TYPE 2 DIABETES MELLITUS WITH STAGE 3B CHRONIC KIDNEY DISEASE, WITHOUT LONG-TERM CURRENT USE OF INSULIN (H): Primary | ICD-10-CM

## 2022-11-11 DIAGNOSIS — E11.22 TYPE 2 DIABETES MELLITUS WITH STAGE 3B CHRONIC KIDNEY DISEASE, WITHOUT LONG-TERM CURRENT USE OF INSULIN (H): Primary | ICD-10-CM

## 2022-11-11 DIAGNOSIS — K21.9 GASTROESOPHAGEAL REFLUX DISEASE WITHOUT ESOPHAGITIS: ICD-10-CM

## 2022-11-11 DIAGNOSIS — M25.511 CHRONIC RIGHT SHOULDER PAIN: ICD-10-CM

## 2022-11-11 PROCEDURE — 99207 PR FOOT EXAM NO CHARGE: CPT | Performed by: INTERNAL MEDICINE

## 2022-11-11 PROCEDURE — 82043 UR ALBUMIN QUANTITATIVE: CPT | Performed by: INTERNAL MEDICINE

## 2022-11-11 PROCEDURE — 91312 COVID-19,PF,PFIZER BOOSTER BIVALENT: CPT | Performed by: INTERNAL MEDICINE

## 2022-11-11 PROCEDURE — 0124A COVID-19,PF,PFIZER BOOSTER BIVALENT: CPT | Performed by: INTERNAL MEDICINE

## 2022-11-11 PROCEDURE — 90662 IIV NO PRSV INCREASED AG IM: CPT | Performed by: INTERNAL MEDICINE

## 2022-11-11 PROCEDURE — 99214 OFFICE O/P EST MOD 30 MIN: CPT | Mod: 25 | Performed by: INTERNAL MEDICINE

## 2022-11-11 PROCEDURE — G0008 ADMIN INFLUENZA VIRUS VAC: HCPCS | Performed by: INTERNAL MEDICINE

## 2022-11-11 NOTE — PROGRESS NOTES
Assessment & Plan     Type 2 diabetes mellitus with stage 3b chronic kidney disease, without long-term current use of insulin (H)  Acceptable control, and her A1c goal of less than 8.  Continue current medications, follow-up in 6 months  - Albumin Random Urine Quantitative with Creat Ratio  - glimepiride (AMARYL) 4 MG tablet; Take 2 tablets (8 mg) by mouth every morning (before breakfast)  - pioglitazone (ACTOS) 45 MG tablet; Take 1 tablet (45 mg) by mouth daily  - blood glucose (NO BRAND SPECIFIED) test strip; Use to test blood sugars 1 times daily or as directed  - blood glucose (NO BRAND SPECIFIED) lancets standard; Use to test blood sugar 1 times daily or as directed.  - Basic metabolic panel  (Ca, Cl, CO2, Creat, Gluc, K, Na, BUN); Future  - Hemoglobin A1c; Future  - FOOT EXAM    Morbid obesity (H)  She has lost weight, encouraged her to continue working on this. advised that morbid obesity is associated with health risks and losing weight could help improve  health including improving health problems including diabetes, hypertension, hyperlipidemia.      Stage 3a chronic kidney disease (H)  Stable, continue medication, urine protein today  - benazepril-hydrochlorothiazide (LOTENSIN HCT) 20-25 MG tablet; Take 1 tablet by mouth daily    Benign essential hypertension  Well-controlled, continue medication  - benazepril-hydrochlorothiazide (LOTENSIN HCT) 20-25 MG tablet; Take 1 tablet by mouth daily  - metoprolol succinate ER (TOPROL XL) 50 MG 24 hr tablet; Take 2 tablets (100 mg) by mouth daily    Hyperlipidemia LDL goal <100  Not optimal, work on diet, recheck next time  - Lipid panel reflex to direct LDL Fasting; Future    Gastroesophageal reflux disease without esophagitis  Stable, continue medication  - pantoprazole (PROTONIX) 40 MG EC tablet; Take 1 tablet (40 mg) by mouth daily    Vertigo  Will refill Valium  - diazepam (VALIUM) 5 MG tablet; Take 0.5-1 tablets (2.5-5 mg) by mouth every 8 hours as needed  (vertigo)    Chronic right shoulder pain  Probably arthritis but may have some impingement, refer to orthopedics  - Orthopedic  Referral; Future    Urinary frequency  Stable, refill medication  - tolterodine ER (DETROL LA) 4 MG 24 hr capsule; TAKE ONE CAPSULE BY MOUTH ONCE DAILY    Need for prophylactic vaccination and inoculation against influenza    - INFLUENZA, QUAD, HIGH DOSE, PF, 65YR + (FLUZONE HD)      Return in about 6 months (around 5/11/2023).    Brandi Rai MD  [facility] is a 81 year old accompanied by her daughter skyler, presenting for the following health issues:  Diabetes (Follow up )        Diabetes Follow-up    How often are you checking your blood sugar? One time daily  What time of day are you checking your blood sugars (select all that apply)?  Before meals  Have you had any blood sugars above 200?  No  Have you had any blood sugars below 70?  No    What symptoms do you notice when your blood sugar is low?  None    What concerns do you have today about your diabetes? None     Do you have any of these symptoms? (Select all that apply)  Tingling in feet     Have you had a diabetic eye exam in the last 12 months? Yes-  Location: October       Hyperlipidemia Follow-Up      Are you regularly taking any medication or supplement to lower your cholesterol?   No    Are you having muscle aches or other side effects that you think could be caused by your cholesterol lowering medication?  No    Hypertension Follow-up      Do you check your blood pressure regularly outside of the clinic? No     Are you following a low salt diet? No    Are your blood pressures ever more than 140 on the top number (systolic) OR more   than 90 on the bottom number (diastolic), for example 140/90? No          How many servings of fruits and vegetables do you eat daily?  4 or more    On average, how many sweetened beverages do you drink each day (Examples: soda, juice,  sweet tea, etc.  Do NOT count diet or artificially sweetened beverages)?   1    How many days per week do you exercise enough to make your heart beat faster? 3 or less    How many minutes a day do you exercise enough to make your heart beat faster? 9 or less    How many days per week do you miss taking your medication? 0      Other problems:   1.  Esophageal reflux: Stable  2.  CKD: Overall stable over the last year and a half  3.  Benign positional vertigo: She has been having some episodes on and off, would like a refill of Valium which is what works best, the meclizine did not seem to help  4.  Morbid obesity: She has lost 10 pounds in the past year       Current concerns:   1.  She has been having pain in the right shoulder for some years, radiates down the arm.  She was seen in about June 2021, was told it was probably arthritis and had some physical therapy but it is been aggravating her more recently.    Patient Active Problem List   Diagnosis     Benign essential hypertension     Esophageal reflux     Hyperlipidemia LDL goal <100     CKD (chronic kidney disease) stage 3, GFR 30-59 ml/min (H)     Benign paroxysmal positional vertigo     Morbid obesity (H)     Type 2 diabetes mellitus with stage 3 chronic kidney disease, without long-term current use of insulin (H)     Tracheal compression     Right thyroid nodule       Current Outpatient Medications   Medication Sig Dispense Refill     Acetaminophen 325 MG CAPS Take 325-650 mg by mouth every 4 hours as needed       Ascorbic Acid (VITAMIN C CR) 1000 MG TBCR Take 1 tablet by mouth daily        aspirin 81 MG tablet Take 1 tablet (81 mg) by mouth daily       benazepril-hydrochlorothiazide (LOTENSIN HCT) 20-25 MG tablet Take 1 tablet by mouth daily 90 tablet 3     blood glucose (NO BRAND SPECIFIED) lancets standard Use to test blood sugar 1 times daily or as directed. 100 lancet 3     blood glucose (NO BRAND SPECIFIED) test strip Use to test blood sugars 1 times  daily or as directed 100 strip 3     Calcium Citrate-Vitamin D (CALCIUM CITRATE + D) 315-200 MG-UNIT TABS Take 1 tablet by mouth 2 times daily.       diazepam (VALIUM) 5 MG tablet Take 0.5-1 tablets (2.5-5 mg) by mouth every 8 hours as needed (vertigo) 20 tablet 2     glimepiride (AMARYL) 4 MG tablet Take 2 tablets (8 mg) by mouth every morning (before breakfast) 180 tablet 3     metoprolol succinate ER (TOPROL XL) 50 MG 24 hr tablet Take 2 tablets (100 mg) by mouth daily 180 tablet 3     Multiple Vitamin (DAILY MULTIVITAMIN PO) Take 1 tablet by mouth daily.       pantoprazole (PROTONIX) 40 MG EC tablet Take 1 tablet (40 mg) by mouth daily 90 tablet 3     pioglitazone (ACTOS) 45 MG tablet Take 1 tablet (45 mg) by mouth daily 90 tablet 3     tolterodine ER (DETROL LA) 4 MG 24 hr capsule TAKE ONE CAPSULE BY MOUTH ONCE DAILY 90 capsule 3       Social History     Tobacco Use     Smoking status: Former     Smokeless tobacco: Never     Tobacco comments:     quit 1995   Vaping Use     Vaping Use: Never used   Substance Use Topics     Alcohol use: No     Drug use: No        ROS:  General: no fever, chills  Weight: down  Vision:negative. Last eye exam 10/22.  ENT: Negative  Respiratory negative.  Cardiac: no chest pain or pressure  Abdominal: no nausea, vomiting, abdominal pain, bowel changes  Vascular no complaints of claudication  Neurologic:no complaints of neuropathy  Feet no lesions, in grown nails, edema   : no polyuria, hematuria, dysuria    Objective:  Patient alert in NAD  /68   Pulse 89   Temp 97.5  F (36.4  C) (Tympanic)   Resp 20   Wt 83 kg (182 lb 14.4 oz)   SpO2 100%   BMI 36.63 kg/m         Wt Readings from Last 4 Encounters:   11/11/22 83 kg (182 lb 14.4 oz)   09/20/21 89.7 kg (197 lb 12.8 oz)   06/21/21 88.5 kg (195 lb)   12/01/20 84.2 kg (185 lb 9.6 oz)       CV: CV: normal S1, S2 with 2/6 WYATT, no  S3 or S4.  Carotid pulses: full  LUNGS: clear  Feet: pulses full, normal capillary refill  No  lesions, sores or skin changes  Nails normal  Sensation able to feel fine filament    Lab Results   Component Value Date    A1C 7.2 11/09/2022    A1C 7.5 05/19/2022    A1C 8.0 11/04/2021    A1C 7.2 06/11/2021    A1C 6.6 11/30/2020    A1C 7.4 10/11/2019    A1C 7.3 04/15/2019    A1C 7.8 10/06/2018       Lab Results   Component Value Date    CHOL 194 11/09/2022    CHOL 146 11/30/2020    HDL 55 11/09/2022    HDL 59 11/30/2020     11/09/2022    LDL 71 11/30/2020    TRIG 79 11/09/2022    TRIG 82 11/30/2020    CHOLHDLRATIO 2.3 03/24/2015

## 2022-11-12 LAB
CREAT UR-MCNC: 128 MG/DL
MICROALBUMIN UR-MCNC: 11 MG/L
MICROALBUMIN/CREAT UR: 8.59 MG/G CR (ref 0–25)

## 2022-11-12 RX ORDER — DIAZEPAM 5 MG
2.5-5 TABLET ORAL EVERY 8 HOURS PRN
Qty: 20 TABLET | Refills: 2 | Status: SHIPPED | OUTPATIENT
Start: 2022-11-12 | End: 2023-11-14

## 2022-11-12 RX ORDER — GLIMEPIRIDE 4 MG/1
8 TABLET ORAL
Qty: 180 TABLET | Refills: 3 | Status: SHIPPED | OUTPATIENT
Start: 2022-11-12 | End: 2023-11-14

## 2022-11-12 RX ORDER — TOLTERODINE 4 MG/1
CAPSULE, EXTENDED RELEASE ORAL
Qty: 90 CAPSULE | Refills: 3 | Status: SHIPPED | OUTPATIENT
Start: 2022-11-12 | End: 2023-11-14 | Stop reason: ALTCHOICE

## 2022-11-12 RX ORDER — PIOGLITAZONEHYDROCHLORIDE 45 MG/1
45 TABLET ORAL DAILY
Qty: 90 TABLET | Refills: 3 | Status: SHIPPED | OUTPATIENT
Start: 2022-11-12 | End: 2023-11-14

## 2022-11-12 RX ORDER — BENAZEPRIL/HYDROCHLOROTHIAZIDE 20 MG-25MG
1 TABLET ORAL DAILY
Qty: 90 TABLET | Refills: 3 | Status: SHIPPED | OUTPATIENT
Start: 2022-11-12 | End: 2023-11-14

## 2022-11-12 RX ORDER — METOPROLOL SUCCINATE 50 MG/1
100 TABLET, EXTENDED RELEASE ORAL DAILY
Qty: 180 TABLET | Refills: 3 | Status: SHIPPED | OUTPATIENT
Start: 2022-11-12 | End: 2023-10-13

## 2022-11-12 RX ORDER — PANTOPRAZOLE SODIUM 40 MG/1
40 TABLET, DELAYED RELEASE ORAL DAILY
Qty: 90 TABLET | Refills: 3 | Status: SHIPPED | OUTPATIENT
Start: 2022-11-12 | End: 2023-11-14

## 2022-11-14 ENCOUNTER — TELEPHONE (OUTPATIENT)
Dept: INTERNAL MEDICINE | Facility: CLINIC | Age: 82
End: 2022-11-14

## 2022-11-14 NOTE — TELEPHONE ENCOUNTER
PRIOR AUTHORIZATION DENIED    Medication: diazepam (VALIUM) 5 MG tablet - EPA DENIED    Denial Date: 11/14/2022    Denial Rational: NOT COVERED FOR DX - VERTIGO      Appeal Information:

## 2022-11-15 NOTE — TELEPHONE ENCOUNTER
PA already denied, for Vertigo diagnosis.   See messages below. An Appeal would need to be started. See the options for this.

## 2022-11-20 VITALS
HEART RATE: 89 BPM | OXYGEN SATURATION: 100 % | BODY MASS INDEX: 36.87 KG/M2 | HEIGHT: 59 IN | TEMPERATURE: 97.5 F | WEIGHT: 182.9 LBS | SYSTOLIC BLOOD PRESSURE: 118 MMHG | RESPIRATION RATE: 20 BRPM | DIASTOLIC BLOOD PRESSURE: 68 MMHG

## 2023-04-15 ENCOUNTER — HEALTH MAINTENANCE LETTER (OUTPATIENT)
Age: 83
End: 2023-04-15

## 2023-06-01 ENCOUNTER — HEALTH MAINTENANCE LETTER (OUTPATIENT)
Age: 83
End: 2023-06-01

## 2023-09-15 ENCOUNTER — TRANSFERRED RECORDS (OUTPATIENT)
Dept: HEALTH INFORMATION MANAGEMENT | Facility: CLINIC | Age: 83
End: 2023-09-15
Payer: COMMERCIAL

## 2023-09-15 LAB — RETINOPATHY: NEGATIVE

## 2023-10-13 DIAGNOSIS — I10 BENIGN ESSENTIAL HYPERTENSION: ICD-10-CM

## 2023-10-13 RX ORDER — METOPROLOL SUCCINATE 50 MG/1
100 TABLET, EXTENDED RELEASE ORAL DAILY
Qty: 30 TABLET | Refills: 0 | Status: SHIPPED | OUTPATIENT
Start: 2023-10-13 | End: 2023-10-19

## 2023-10-13 NOTE — TELEPHONE ENCOUNTER
Requested Prescriptions   Pending Prescriptions Disp Refills    metoprolol succinate ER (TOPROL XL) 50 MG 24 hr tablet  Last Written Prescription Date:  11/12/22  Last Fill Quantity: 180,  # refills: 3   Last office visit: 11/11/2022 ; last virtual visit: 5/19/2022 with prescribing provider:  11/11/22   Future Office Visit:   Next 5 appointments (look out 90 days)      Nov 14, 2023  9:30 AM  (Arrive by 9:10 AM)  Annual Wellness Visit with KOJO Joseph CNP  [facility] ([facility] ) [address]  [address]  [facility] [address] [phone]                  180 tablet 3     Sig: Take 2 tablets (100 mg) by mouth daily       There is no refill protocol information for this order

## 2023-10-17 ENCOUNTER — TELEPHONE (OUTPATIENT)
Dept: INTERNAL MEDICINE | Facility: CLINIC | Age: 83
End: 2023-10-17
Payer: COMMERCIAL

## 2023-10-17 DIAGNOSIS — R35.0 URINARY FREQUENCY: Primary | ICD-10-CM

## 2023-10-17 DIAGNOSIS — I10 BENIGN ESSENTIAL HYPERTENSION: ICD-10-CM

## 2023-10-17 NOTE — TELEPHONE ENCOUNTER
CENTRAL PRIOR AUTHORIZATION  [phone]    PA Initiation    Medication: TOLTERODINE TARTRATE ER 4 MG PO CP24  Insurance Company: [employer] - Phone [phone] Fax [phone]  Pharmacy Filling the Rx:    Filling Pharmacy Phone:    Filling Pharmacy Fax:    Start Date: 10/17/2023

## 2023-10-17 NOTE — TELEPHONE ENCOUNTER
Reason for Call:  prescription    Detailed comments: prescription metoprolol was filled for 2 weeks and her next appt is over 2 weeks away and will be out of this medication. [facility] is faxing over a exception form but it has to be returned to them in 72 hours and they faxed over this am. Please call if  you didn't get form    Phone Number Patient can be reached at: Cell number on file:    Telephone Information:   Mobile [phone]       Best Time: anytime    Can we leave a detailed message on this number? YES    Call taken on 10/17/2023 at 3:08 PM by AIDA HOFFMANN

## 2023-10-18 NOTE — TELEPHONE ENCOUNTER
Central Prior Authorization Team   Phone: [phone]      Per [employer] System: Case ID [id number]: denied per system    PA Rep from [employer] will fax over denial letter.

## 2023-10-19 NOTE — TELEPHONE ENCOUNTER
Form not found. Called patient and left a detailed message asking if it's ok that we put in a request for another 2 week refill to last her until her appointment and send prescription to her preferred LOCAL pharmacy [facility]. Medication and pharmacy pended. Please route to [place] REFILL Pool if patient is OK with plan.

## 2023-10-19 NOTE — TELEPHONE ENCOUNTER
Central Prior Authorization Team   Phone: [phone]        PRIOR AUTHORIZATION DENIED    Medication: TOLTERODINE TARTRATE ER 4 MG PO CP24  Insurance Company: [employer] - Phone [phone] Fax [phone]  Denial Date: 10/18/2023  Denial Rational: THE PATIENT WOULD NEED TO HAVE EITHER TRIED THE LOWER TIERED ALTS, OR HAVE A CONTRAINDICATION TO THEM. PLEASE SEE PREFERRED LOWER TIER LIST BELOW:            Appeal Information:           Patient Notified: No. Please note: Providers/Clinics are to notify the patients of the denial outcomes and steps going forward.

## 2023-10-20 RX ORDER — METOPROLOL SUCCINATE 50 MG/1
100 TABLET, EXTENDED RELEASE ORAL DAILY
Qty: 30 TABLET | Refills: 0 | Status: SHIPPED | OUTPATIENT
Start: 2023-10-20 | End: 2023-11-14

## 2023-10-27 RX ORDER — TOLTERODINE TARTRATE 2 MG/1
2 TABLET, EXTENDED RELEASE ORAL 2 TIMES DAILY
Qty: 180 TABLET | Refills: 3 | Status: SHIPPED | OUTPATIENT
Start: 2023-10-27

## 2023-10-27 NOTE — TELEPHONE ENCOUNTER
She already tried and failed oxybutinin but will change to short acting detrol. Advise her of change, she will need to take 2 times a day.

## 2023-11-14 ENCOUNTER — OFFICE VISIT (OUTPATIENT)
Dept: INTERNAL MEDICINE | Facility: CLINIC | Age: 83
End: 2023-11-14
Payer: COMMERCIAL

## 2023-11-14 ENCOUNTER — TELEPHONE (OUTPATIENT)
Dept: INTERNAL MEDICINE | Facility: CLINIC | Age: 83
End: 2023-11-14

## 2023-11-14 VITALS
OXYGEN SATURATION: 98 % | DIASTOLIC BLOOD PRESSURE: 68 MMHG | TEMPERATURE: 97.7 F | WEIGHT: 183 LBS | BODY MASS INDEX: 36.89 KG/M2 | HEIGHT: 59 IN | RESPIRATION RATE: 14 BRPM | SYSTOLIC BLOOD PRESSURE: 126 MMHG | HEART RATE: 73 BPM

## 2023-11-14 DIAGNOSIS — Z00.00 ENCOUNTER FOR MEDICARE ANNUAL WELLNESS EXAM: ICD-10-CM

## 2023-11-14 DIAGNOSIS — N18.32 TYPE 2 DIABETES MELLITUS WITH STAGE 3B CHRONIC KIDNEY DISEASE, WITHOUT LONG-TERM CURRENT USE OF INSULIN (H): ICD-10-CM

## 2023-11-14 DIAGNOSIS — R42 VERTIGO: ICD-10-CM

## 2023-11-14 DIAGNOSIS — E66.01 MORBID OBESITY (H): ICD-10-CM

## 2023-11-14 DIAGNOSIS — K21.9 GASTROESOPHAGEAL REFLUX DISEASE WITHOUT ESOPHAGITIS: ICD-10-CM

## 2023-11-14 DIAGNOSIS — Z79.899 CONTROLLED SUBSTANCE AGREEMENT SIGNED: ICD-10-CM

## 2023-11-14 DIAGNOSIS — I10 BENIGN ESSENTIAL HYPERTENSION: ICD-10-CM

## 2023-11-14 DIAGNOSIS — E11.22 TYPE 2 DIABETES MELLITUS WITH STAGE 3A CHRONIC KIDNEY DISEASE, WITHOUT LONG-TERM CURRENT USE OF INSULIN (H): ICD-10-CM

## 2023-11-14 DIAGNOSIS — N18.31 STAGE 3A CHRONIC KIDNEY DISEASE (H): ICD-10-CM

## 2023-11-14 DIAGNOSIS — Z00.00 ROUTINE GENERAL MEDICAL EXAMINATION AT A HEALTH CARE FACILITY: Primary | ICD-10-CM

## 2023-11-14 DIAGNOSIS — Z59.9 FINANCIAL DIFFICULTIES: ICD-10-CM

## 2023-11-14 DIAGNOSIS — E11.22 TYPE 2 DIABETES MELLITUS WITH STAGE 3B CHRONIC KIDNEY DISEASE, WITHOUT LONG-TERM CURRENT USE OF INSULIN (H): ICD-10-CM

## 2023-11-14 DIAGNOSIS — N18.31 TYPE 2 DIABETES MELLITUS WITH STAGE 3A CHRONIC KIDNEY DISEASE, WITHOUT LONG-TERM CURRENT USE OF INSULIN (H): ICD-10-CM

## 2023-11-14 LAB
HBA1C MFR BLD: 6.3 % (ref 0–5.6)
HGB BLD-MCNC: 13.3 G/DL (ref 11.7–15.7)

## 2023-11-14 PROCEDURE — 80048 BASIC METABOLIC PNL TOTAL CA: CPT | Performed by: NURSE PRACTITIONER

## 2023-11-14 PROCEDURE — 90480 ADMN SARSCOV2 VAC 1/ONLY CMP: CPT | Performed by: NURSE PRACTITIONER

## 2023-11-14 PROCEDURE — 85018 HEMOGLOBIN: CPT | Performed by: NURSE PRACTITIONER

## 2023-11-14 PROCEDURE — G0008 ADMIN INFLUENZA VIRUS VAC: HCPCS | Performed by: NURSE PRACTITIONER

## 2023-11-14 PROCEDURE — G0439 PPPS, SUBSEQ VISIT: HCPCS | Performed by: NURSE PRACTITIONER

## 2023-11-14 PROCEDURE — 91320 SARSCV2 VAC 30MCG TRS-SUC IM: CPT | Performed by: NURSE PRACTITIONER

## 2023-11-14 PROCEDURE — 90662 IIV NO PRSV INCREASED AG IM: CPT | Performed by: NURSE PRACTITIONER

## 2023-11-14 PROCEDURE — 36415 COLL VENOUS BLD VENIPUNCTURE: CPT | Performed by: NURSE PRACTITIONER

## 2023-11-14 PROCEDURE — 80061 LIPID PANEL: CPT | Performed by: NURSE PRACTITIONER

## 2023-11-14 PROCEDURE — 82043 UR ALBUMIN QUANTITATIVE: CPT | Performed by: NURSE PRACTITIONER

## 2023-11-14 PROCEDURE — 99214 OFFICE O/P EST MOD 30 MIN: CPT | Mod: 25 | Performed by: NURSE PRACTITIONER

## 2023-11-14 PROCEDURE — 82570 ASSAY OF URINE CREATININE: CPT | Performed by: NURSE PRACTITIONER

## 2023-11-14 PROCEDURE — 83036 HEMOGLOBIN GLYCOSYLATED A1C: CPT | Performed by: NURSE PRACTITIONER

## 2023-11-14 RX ORDER — RESPIRATORY SYNCYTIAL VIRUS VACCINE 120MCG/0.5
0.5 KIT INTRAMUSCULAR ONCE
Qty: 1 EACH | Refills: 0 | Status: CANCELLED | OUTPATIENT
Start: 2023-11-14 | End: 2023-11-14

## 2023-11-14 RX ORDER — PIOGLITAZONEHYDROCHLORIDE 45 MG/1
45 TABLET ORAL DAILY
Qty: 90 TABLET | Refills: 3 | Status: SHIPPED | OUTPATIENT
Start: 2023-11-14

## 2023-11-14 RX ORDER — GLIMEPIRIDE 4 MG/1
8 TABLET ORAL
Qty: 180 TABLET | Refills: 3 | Status: SHIPPED | OUTPATIENT
Start: 2023-11-14

## 2023-11-14 RX ORDER — PANTOPRAZOLE SODIUM 40 MG/1
40 TABLET, DELAYED RELEASE ORAL DAILY
Qty: 90 TABLET | Refills: 3 | Status: SHIPPED | OUTPATIENT
Start: 2023-11-14

## 2023-11-14 RX ORDER — BENAZEPRIL/HYDROCHLOROTHIAZIDE 20 MG-25MG
1 TABLET ORAL DAILY
Qty: 90 TABLET | Refills: 3 | Status: SHIPPED | OUTPATIENT
Start: 2023-11-14

## 2023-11-14 RX ORDER — DIAZEPAM 5 MG
2.5-5 TABLET ORAL EVERY 8 HOURS PRN
Qty: 20 TABLET | Refills: 2 | Status: SHIPPED | OUTPATIENT
Start: 2023-11-14

## 2023-11-14 RX ORDER — METOPROLOL SUCCINATE 50 MG/1
100 TABLET, EXTENDED RELEASE ORAL DAILY
Qty: 90 TABLET | Refills: 3 | Status: SHIPPED | OUTPATIENT
Start: 2023-11-14 | End: 2024-09-12

## 2023-11-14 SDOH — ECONOMIC STABILITY - INCOME SECURITY: PROBLEM RELATED TO HOUSING AND ECONOMIC CIRCUMSTANCES, UNSPECIFIED: Z59.9

## 2023-11-14 ASSESSMENT — ENCOUNTER SYMPTOMS
NERVOUS/ANXIOUS: 0
DIZZINESS: 0
HEADACHES: 0
CONSTIPATION: 0
DYSURIA: 0
SORE THROAT: 0
MYALGIAS: 1
CHILLS: 0
FREQUENCY: 1
EYE PAIN: 0
PARESTHESIAS: 0
HEMATURIA: 0
HEARTBURN: 1
COUGH: 0
HEMATOCHEZIA: 0
ABDOMINAL PAIN: 0
NAUSEA: 0
SHORTNESS OF BREATH: 0
ARTHRALGIAS: 1
WEAKNESS: 0
PALPITATIONS: 0
FEVER: 0
DIARRHEA: 0

## 2023-11-14 ASSESSMENT — ACTIVITIES OF DAILY LIVING (ADL)
CURRENT_FUNCTION: SHOPPING REQUIRES ASSISTANCE
CURRENT_FUNCTION: MONEY MANAGEMENT REQUIRES ASSISTANCE
CURRENT_FUNCTION: TRANSPORTATION REQUIRES ASSISTANCE

## 2023-11-14 NOTE — PROGRESS NOTES
"SUBJECTIVE:   Janya is a 82 year old who presents for Preventive Visit.      11/14/2023     9:10 AM   Additional Questions   Roomed by Tammi AUSTIN       Are you in the first 12 months of your Medicare coverage?  No    Healthy Habits:     In general, how would you rate your overall health?  Fair    Frequency of exercise:  1 day/week    Duration of exercise:  15-30 minutes    Do you usually eat at least 4 servings of fruit and vegetables a day, include whole grains    & fiber and avoid regularly eating high fat or \"junk\" foods?  Yes    Taking medications regularly:  Yes    Medication side effects:  Not applicable    Ability to successfully perform activities of daily living:  Transportation requires assistance, shopping requires assistance and money management requires assistance    Home Safety:  Throw rugs in the hallway and lack of grab bars in the bathroom    Hearing Impairment:  Difficulty following a conversation in a noisy restaurant or crowded room and difficulty understanding soft or whispered speech    In the past 6 months, have you been bothered by leaking of urine? Yes    In general, how would you rate your overall mental or emotional health?  Excellent    Additional concerns today:  Yes      Today's PHQ-2 Score:       11/14/2023     9:08 AM   PHQ-2 ( 1999 [employer])   Q1: Little interest or pleasure in doing things 0   Q2: Feeling down, depressed or hopeless 0   PHQ-2 Score 0   Q1: Little interest or pleasure in doing things Not at all   Q2: Feeling down, depressed or hopeless Not at all   PHQ-2 Score 0           Have you ever done Advance Care Planning? (For example, a Health Directive, POLST, or a discussion with a medical provider or your loved ones about your wishes): No, advance care planning information given to patient to review.  Patient plans to discuss their wishes with loved ones or provider.         Fall risk  Fallen 2 or more times in the past year?: No  Any fall with injury in the past year?: " No    Cognitive Screening   1) Repeat 3 items (Leader, Season, Table)    2) Clock draw: ABNORMAL    3) 3 item recall: Recalls NO objects   Results: ABNORMAL clock, 1-2 items recalled: PROBABLE COGNITIVE IMPAIRMENT, **INFORM PROVIDER**    Mini-CogTM Copyright KRISTEN Isabel. Licensed by the author for use in [facility]; reprinted with permission (sheeba@[facility]). All rights reserved.      Do you have sleep apnea, excessive snoring or daytime drowsiness? : no    Reviewed and updated as needed this visit by clinical staff   Tobacco  Allergies  Meds  Problems  Med Hx  Surg Hx  Fam Hx          Reviewed and updated as needed this visit by Provider    Allergies              Social History     Tobacco Use    Smoking status: Former    Smokeless tobacco: Never    Tobacco comments:     quit 1995   Substance Use Topics    Alcohol use: No             11/14/2023     9:08 AM   Alcohol Use   Prescreen: >3 drinks/day or >7 drinks/week? No     Do you have a current opioid prescription? No  Do you use any other controlled substances or medications that are not prescribed by a provider? None              Current providers sharing in care for this patient include:   Patient Care Team:  Shabana Martins APRN CNP as PCP - General (Internal Medicine)  Brandi Rai MD as Assigned PCP    The following health maintenance items are reviewed in Epic and correct as of today:  Health Maintenance   Topic Date Due    PARATHYROID  Never done    PHOSPHORUS  Never done    ZOSTER IMMUNIZATION (1 of 2) Never done    RSV VACCINE (Pregnancy & 60+) (1 - 1-dose 60+ series) Never done    MEDICARE ANNUAL WELLNESS VISIT  12/01/2021    A1C  05/09/2023    MICROALBUMIN  05/11/2023    INFLUENZA VACCINE (1) 09/01/2023    COVID-19 Vaccine (5 - 2023-24 season) 09/01/2023    BMP  11/09/2023    LIPID  11/09/2023    HEMOGLOBIN  05/19/2023    DIABETIC FOOT EXAM  11/20/2023    EYE EXAM  09/15/2024    ANNUAL REVIEW OF HM ORDERS  11/14/2024    FALL RISK  "ASSESSMENT  11/14/2024    DEXA  04/09/2028    ADVANCE CARE PLANNING  11/14/2028    DTAP/TDAP/TD IMMUNIZATION (2 - Td or Tdap) 12/01/2030    PHQ-2 (once per calendar year)  Completed    Pneumococcal Vaccine: 65+ Years  Completed    URINALYSIS  Completed    ALK PHOS  Completed    IPV IMMUNIZATION  Aged Out    HPV IMMUNIZATION  Aged Out    MENINGITIS IMMUNIZATION  Aged Out    RSV MONOCLONAL ANTIBODY  Aged Out    LUNG CANCER SCREENING  Discontinued             Pertinent mammograms are reviewed under the imaging tab.    Review of Systems   Constitutional:  Negative for chills and fever.   HENT:  Negative for congestion, ear pain, hearing loss and sore throat.    Eyes:  Negative for pain.   Respiratory:  Negative for cough and shortness of breath.    Cardiovascular:  Positive for peripheral edema. Negative for chest pain and palpitations.   Gastrointestinal:  Positive for heartburn. Negative for abdominal pain, constipation, diarrhea, hematochezia and nausea.   Breasts:  Negative for tenderness.   Genitourinary:  Positive for frequency and urgency. Negative for dysuria, genital sores, hematuria, pelvic pain, vaginal bleeding and vaginal discharge.   Musculoskeletal:  Positive for arthralgias and myalgias.   Skin:  Negative for rash.   Neurological:  Negative for dizziness, weakness, headaches and paresthesias.   Psychiatric/Behavioral:  The patient is not nervous/anxious.      Glucose     Heartburn - protonix   Burping a lot     Swelling in feet- some swelling in feet at times    Detrol helping with urination      - food resources   SNAP?  Given paperwork   Care coordinator referral     Seen ear doctor and dizzy balance   Sometimes needs to have valium prn      Goes to [place] over the winter   OBJECTIVE:   /68   Pulse 73   Temp 97.7  F (36.5  C) (Oral)   Resp 14   Ht 1.505 m (4' 11.25\")   Wt 83 kg (183 lb)   SpO2 98%   BMI 36.65 kg/m   Estimated body mass index is 36.65 kg/m  as calculated " "from the following:    Height as of this encounter: 1.505 m (4' 11.25\").    Weight as of this encounter: 83 kg (183 lb).  Physical Exam  GENERAL: alert and no distress- here with friend who is a nurse and her advocate   EYES: Eyes grossly normal to inspection,  and conjunctivae and sclerae normal  HENT: ear canals and TM's normal, nose and mouth without ulcers or lesions  NECK: no adenopathy, no asymmetry, masses, or scars and thyroid normal to palpation  RESP: lungs clear to auscultation - no rales, rhonchi or wheezes  CV: regular rate and rhythm, normal S1 S2, no S3 or S4, no murmur, click or rub, no peripheral edema and peripheral pulses strong  ABDOMEN: soft, nontender, no hepatosplenomegaly, no masses and bowel sounds normal  MS: no gross musculoskeletal defects noted, no edema  SKIN: no suspicious lesions or rashes  NEURO: Normal strength and tone, mentation intact and speech normal  PSYCH: mentation appears normal, affect normal/bright    Diagnostic Test Results:  Labs reviewed in [facility]  Lab     ASSESSMENT / PLAN:   (Z00.00) Routine general medical examination at a health care facility  (primary encounter diagnosis)  Comment:   Plan:     (E11.22,  N18.31) Type 2 diabetes mellitus with stage 3a chronic kidney disease, without long-term current use of insulin (H)  Comment: in good control   Lab Results   Component Value Date    A1C 7.2 11/09/2022    A1C 7.5 05/19/2022    A1C 8.0 11/04/2021    A1C 7.2 06/11/2021    A1C 6.6 11/30/2020    A1C 7.4 10/11/2019    A1C 7.3 04/15/2019    A1C 7.8 10/06/2018       Plan: HEMOGLOBIN A1C, BASIC METABOLIC PANEL, Lipid         panel reflex to direct LDL Fasting, Albumin         Random Urine Quantitative with Creat Ratio,         Hemoglobin            (E66.01) Morbid obesity (H)  Comment: discussed   Plan: weight loss would help with blood pressure and diabetes      (E11.22,  N18.32) Type 2 diabetes mellitus with stage 3b chronic kidney disease, without long-term current use of " insulin (H)  Comment:   Plan: pioglitazone (ACTOS) 45 MG tablet, glimepiride         (AMARYL) 4 MG tablet, blood glucose (NO BRAND         SPECIFIED) test strip, blood glucose (NO BRAND         SPECIFIED) lancets standard            (N18.31) Stage 3a chronic kidney disease (H)  Comment:   Plan: benazepril-hydrochlorothiazide (LOTENSIN HCT)         20-25 MG tablet            (I10) Benign essential hypertension  Comment: in good range   Plan: benazepril-hydrochlorothiazide (LOTENSIN HCT)         20-25 MG tablet, metoprolol succinate ER         (TOPROL XL) 50 MG 24 hr tablet            (K21.9) Gastroesophageal reflux disease without esophagitis  Comment: helps with reflux   Plan: pantoprazole (PROTONIX) 40 MG EC tablet            (Z59.9) Financial difficulties  Comment: food issues re to finances   Plan: Primary Care - Care Coordination Referral            (R42) Vertigo  Comment: uses prn   Plan: diazepam (VALIUM) 5 MG tablet            (Z00.00) Encounter for Medicare annual wellness exam  Comment:   Plan:     (Z79.899) Controlled substance agreement signed  Comment: valium prn vertigo  signed 11/14/2023  Plan:           COUNSELING:  Reviewed preventive health counseling, as reflected in patient instructions       Regular exercise       Healthy diet/nutrition       Bladder control       Immunizations  Vaccinated for: Covid-19 and Influenza           Osteoporosis prevention/bone health        She reports that she has quit smoking. She has never used smokeless tobacco.      Appropriate preventive services were discussed with this patient, including applicable screening as appropriate for fall prevention, nutrition, physical activity, Tobacco-use cessation, weight loss and cognition.  Checklist reviewing preventive services available has been given to the patient.    Reviewed patients plan of care and provided an AVS. The Basic Care Plan (routine screening as documented in Health Maintenance) for Janay meets the Care  Plan requirement. This Care Plan has been established and reviewed with the Patient and caregiver.        KOJO Joseph CNP  [facility]    Identified Health Risks:    The patient was provided with suggestions to help her develop a healthy physical lifestyle.  She is at risk for lack of exercise and has been provided with information to increase physical activity for the benefit of her well-being.  The patient reports that she has difficulty with activities of daily living. I have asked that the patient make a follow up appointment in  weeks where this issue will be further evaluated and addressed.  The patient was provided with written information regarding signs of hearing loss.  Information on urinary incontinence and treatment options given to patient.

## 2023-11-14 NOTE — PATIENT INSTRUCTIONS
Lab in [address]    Medication refilled     Care coordinator referral  Patient Education   Personalized Prevention Plan  You are due for the preventive services outlined below.  Your care team is available to assist you in scheduling these services.  If you have already completed any of these items, please share that information with your care team to update in your medical record.  Health Maintenance Due   Topic Date Due     Parathyroid Lab  Never done     Phosphorous Lab  Never done     Zoster (Shingles) Vaccine (1 of 2) Never done     RSV VACCINE (Pregnancy & 60+) (1 - 1-dose 60+ series) Never done     Annual Wellness Visit  12/01/2021     A1C Lab  05/09/2023     Kidney Microalbumin Urine Test  05/11/2023     Flu Vaccine (1) 09/01/2023     COVID-19 Vaccine (5 - 2023-24 season) 09/01/2023     Basic Metabolic Panel  11/09/2023     Cholesterol Lab  11/09/2023     Hemoglobin  05/19/2023     Diabetic Foot Exam  11/20/2023     Your Health Risk Assessment indicates you feel you are not in good health    A healthy lifestyle helps keep the body fit and the mind alert. It helps protect you from disease, helps you fight disease, and helps prevent chronic disease (disease that doesn't go away) from getting worse. This is important as you get older and begin to notice twinges in muscles and joints and a decline in the strength and stamina you once took for granted. A healthy lifestyle includes good healthcare, good nutrition, weight control, recreation, and regular exercise. Avoid harmful substances and do what you can to keep safe. Another part of a healthy lifestyle is stay mentally active and socially involved.    Good healthcare   Have a wellness visit every year.   If you have new symptoms, let us know right away. Don't wait until the next checkup.   Take medicines exactly as prescribed and keep your medicines in a safe place. Tell us if your medicine causes problems.   Healthy diet and weight control   Eat 3 or 4 small,  "nutritious, low-fat, high-fiber meals a day. Include a variety of fruits, vegetables, and whole-grain foods.   Make sure you get enough calcium in your diet. Calcium, vitamin D, and exercise help prevent osteoporosis (bone thinning).   If you live alone, try eating with others when you can. That way you get a good meal and have company while you eat it.   Try to keep a healthy weight. If you eat more calories than your body uses for energy, it will be stored as fat and you will gain weight.     Recreation   Recreation is not limited to sports and team events. It includes any activity that provides relaxation, interest, enjoyment, and exercise. Recreation provides an outlet for physical, mental, and social energy. It can give a sense of worth and achievement. It can help you stay healthy.    Mental Exercise and Social Involvement  Mental and emotional health is as important as physical health. Keep in touch with friends and family. Stay as active as possible. Continue to learn and challenge yourself.   Things you can do to stay mentally active are:  Learn something new, like a foreign language or musical instrument.   Play S[employer] or do crossword puzzles. If you cannot find people to play these games with you at home, you can play them with others on your computer through the Internet.   Join a games club--anything from card games to chess or checkers or lawn bowling.   Start a new hobby.   Go back to school.   Volunteer.   Read.   Keep up with world events.  Learning About Being Physically Active  What is physical activity?     Being physically active means doing any kind of activity that gets your body moving.  The types of physical activity that can help you get fit and stay healthy include:  Aerobic or \"cardio\" activities. These make your heart beat faster and make you breathe harder, such as brisk walking, riding a bike, or running. They strengthen your heart and lungs and build up your endurance.  Strength " "training activities. These make your muscles work against, or \"resist,\" something. Examples include lifting weights or doing push-ups. These activities help tone and strengthen your muscles and bones.  Stretches. These let you move your joints and muscles through their full range of motion. Stretching helps you be more flexible.  Reaching a balance between these three types of physical activity is important because each one contributes to your overall fitness.  What are the benefits of being active?  Being active is one of the best things you can do for your health. It helps you to:  Feel stronger and have more energy to do all the things you like to do.  Focus better at school or work.  Feel, think, and sleep better.  Reach and stay at a healthy weight.  Lose fat and build lean muscle.  Lower your risk for serious health problems, including diabetes, heart attack, high blood pressure, and some cancers.  Keep your heart, lungs, bones, muscles, and joints strong and healthy.  How can you make being active part of your life?  Start slowly. Make it your long-term goal to get at least 30 minutes of exercise on most days of the week. Walking is a good choice. You also may want to do other activities, such as running, swimming, cycling, or playing tennis or team sports.  Pick activities that you like--ones that make your heart beat faster, your muscles stronger, and your muscles and joints more flexible. If you find more than one thing you like doing, do them all. You don't have to do the same thing every day.  Get your heart pumping every day. Any activity that makes your heart beat faster and keeps it at that rate for a while counts.  Here are some great ways to get your heart beating faster:  Go for a brisk walk, run, or hike.  Go for a swim or bike ride.  Take an online exercise class or dance.  Play a game of touch football, basketball, or soccer.  Play tennis, pickleball, or racquetball.  Climb stairs.  Even some " "household chores can be aerobic. Just do them at a faster pace. Raking or mowing the lawn, sweeping the garage, and vacuuming and cleaning your home all can help get your heart rate up.  Strengthen your muscles during the week. You don't have to lift heavy weights or grow big, bulky muscles to get stronger. Doing a few simple activities that make your muscles work against, or \"resist,\" something can help you get stronger. Aim for at least twice a week.  For example, you can:  Do push-ups or sit-ups, which use your own body weight as resistance.  Lift weights or dumbbells or use stretch bands at home or in a gym or community center.  Stretch your muscles often. Stretching will help you as you become more active. It can help you stay flexible and loosen tight muscles. It can also help improve your balance and posture and can be a great way to relax.  Be sure to stretch the muscles you'll be using when you work out. It's best to warm your muscles slightly before you stretch them. Walk or do some other light aerobic activity for a few minutes. Then start stretching.  When you stretch your muscles:  Do it slowly. Stretching is not about going fast or making sudden movements.  Don't push or bounce during a stretch.  Hold each stretch for at least 15 to 30 seconds, if you can. You should feel a stretch in the muscle, but not pain.  Breathe out as you do the stretch. Then breathe in as you hold the stretch. Don't hold your breath.  If you're worried about how more activity might affect your health, have a checkup before you start. Follow any special advice your doctor gives you for getting a smart start.  Where can you learn more?  Go to https://www.[employer].net/patiented  Enter W332 in the search box to learn more about \"Learning About Being Physically Active.\"  Current as of: June 6, 2023               Content Version: 13.8    2488-8086 [employer].   Care instructions adapted under license by your " healthcare professional. If you have questions about a medical condition or this instruction, always ask your healthcare professional. [employer] disclaims any warranty or liability for your use of this information.      Activities of Daily Living    Your Health Risk Assessment indicates you have difficulties with activities of daily living such as housework, bathing, preparing meals, taking medication, etc. Please make a follow up appointment for us to address this issue in more detail.  Hearing Loss: Care Instructions  Overview     Hearing loss is a sudden or slow decrease in how well you hear. It can range from slight to profound. Permanent hearing loss can occur with aging. It also can happen when you are exposed long-term to loud noise. Examples include listening to loud music, riding motorcycles, or being around other loud machines.  Hearing loss can affect your work and home life. It can make you feel lonely or depressed. You may feel that you have lost your independence. But hearing aids and other devices can help you hear better and feel connected to others.  Follow-up care is a key part of your treatment and safety. Be sure to make and go to all appointments, and call your doctor if you are having problems. It's also a good idea to know your test results and keep a list of the medicines you take.  How can you care for yourself at home?  Avoid loud noises whenever possible. This helps keep your hearing from getting worse.  Always wear hearing protection around loud noises.  Wear a hearing aid as directed.  A professional can help you pick a hearing aid that will work best for you.  You can also get hearing aids over the counter for mild to moderate hearing loss.  Have hearing tests as your doctor suggests. They can show whether your hearing has changed. Your hearing aid may need to be adjusted.  Use other devices as needed. These may include:  Telephone amplifiers and hearing aids that can  "connect to a television, stereo, radio, or microphone.  Devices that use lights or vibrations. These alert you to the doorbell, a ringing telephone, or a baby monitor.  Television closed-captioning. This shows the words at the bottom of the screen. Most new TVs can do this.  TTY (text telephone). This lets you type messages back and forth on the telephone instead of talking or listening. These devices are also called TDD. When messages are typed on the keyboard, they are sent over the phone line to a receiving TTY. The message is shown on a monitor.  Use text messaging, social media, and email if it is hard for you to communicate by telephone.  Try to learn a listening technique called speechreading. It is not lipreading. You pay attention to people's gestures, expressions, posture, and tone of voice. These clues can help you understand what a person is saying. Face the person you are talking to, and have them face you. Make sure the lighting is good. You need to see the other person's face clearly.  Think about counseling if you need help to adjust to your hearing loss.  When should you call for help?  Watch closely for changes in your health, and be sure to contact your doctor if:    You think your hearing is getting worse.     You have new symptoms, such as dizziness or nausea.   Where can you learn more?  Go to https://www.[employer].net/patiented  Enter R798 in the search box to learn more about \"Hearing Loss: Care Instructions.\"  Current as of: February 28, 2023               Content Version: 13.8    5848-0670 [employer].   Care instructions adapted under license by your healthcare professional. If you have questions about a medical condition or this instruction, always ask your healthcare professional. [employer] disclaims any warranty or liability for your use of this information.      Bladder Training: Care Instructions  Your Care Instructions     Bladder training is used to " treat urge incontinence and stress incontinence. Urge incontinence means that the need to urinate comes on so fast that you can't get to a toilet in time. Stress incontinence means that you leak urine because of pressure on your bladder. For example, it may happen when you laugh, cough, or lift something heavy.  Bladder training can increase how long you can wait before you have to urinate. It can also help your bladder hold more urine. And it can give you better control over the urge to urinate.  It is important to remember that bladder training takes a few weeks to a few months to make a difference. You may not see results right away, but don't give up.  Follow-up care is a key part of your treatment and safety. Be sure to make and go to all appointments, and call your doctor if you are having problems. It's also a good idea to know your test results and keep a list of the medicines you take.  How can you care for yourself at home?  Work with your doctor to come up with a bladder training program that is right for you. You may use one or more of the following methods.  Delayed urination  In the beginning, try to keep from urinating for 5 minutes after you first feel the need to go.  While you wait, take deep, slow breaths to relax. Kegel exercises can also help you delay the need to go to the bathroom.  After some practice, when you can easily wait 5 minutes to urinate, try to wait 10 minutes before you urinate.  Slowly increase the waiting period until you are able to control when you have to urinate.  Scheduled urination  Empty your bladder when you first wake up in the morning.  Schedule times throughout the day when you will urinate.  Start by going to the bathroom every hour, even if you don't need to go.  Slowly increase the time between trips to the bathroom.  When you have found a schedule that works well for you, keep doing it.  If you wake up during the night and have to urinate, do it. Apply your  "schedule to waking hours only.  Kegel exercises  These tighten and strengthen pelvic muscles, which can help you control the flow of urine. (If doing these exercises causes pain, stop doing them and talk with your doctor.) To do Kegel exercises:  Squeeze your muscles as if you were trying not to pass gas. Or squeeze your muscles as if you were stopping the flow of urine. Your belly, legs, and buttocks shouldn't move.  Hold the squeeze for 3 seconds, then relax for 5 to 10 seconds.  Start with 3 seconds, then add 1 second each week until you are able to squeeze for 10 seconds.  Repeat the exercise 10 times a session. Do 3 to 8 sessions a day.  When should you call for help?  Watch closely for changes in your health, and be sure to contact your doctor if:    Your incontinence is getting worse.     You do not get better as expected.   Where can you learn more?  Go to https://www.[employer]net/patiented  Enter V684 in the search box to learn more about \"Bladder Training: Care Instructions.\"  Current as of: February 28, 2023               Content Version: 13.8    8131-2289 [employer].   Care instructions adapted under license by your healthcare professional. If you have questions about a medical condition or this instruction, always ask your healthcare professional. [employer] disclaims any warranty or liability for your use of this information.         "

## 2023-11-14 NOTE — TELEPHONE ENCOUNTER
CENTRAL PRIOR AUTHORIZATION  [phone]      [facility] DENIED: LETTER IS ATTACHED

## 2023-11-14 NOTE — LETTER
[facility]  11/14/23  Patient: Janay Kyle  YOB: 1940  Medical Record Number: [id number]                                                                                  Non-Opioid Controlled Substance Agreement    This is an agreement between you and your provider regarding safe and appropriate use of controlled substances prescribed by your care team. Controlled substances are?medicines that can cause physical and mental dependence (abuse).     There are strict laws about having and using these medicines. We here at [facility] are  committed to working with you in your efforts to get better. To support you in this work, we'll help you schedule regular office appointments for medicine refills. If we must cancel or change your appointment for any reason, we'll make sure you have enough medicine to last until your next appointment.     As a Provider, I will:   Listen carefully to your concerns while treating you with respect.   Recommend a treatment plan that I believe is in your best interest and may involve therapies other than medicine.    Talk with you often about the possible benefits and the risk of harm of any medicine that we prescribe for you.  Assess the safety of this medicine and check how well it works.    Provide a plan on how to taper (discontinue or go off) using this medicine if the decision is made to stop its use.      ::  As a Patient, I understand controlled substances:     Are prescribed by my care provider to help me function or work and manage my condition(s).?  Are strong medicines and can cause serious side effects.     Need to be taken exactly as prescribed.?Combining controlled substances with certain medicines or chemicals (such as illegal drugs, alcohol, sedatives, sleeping pills, and benzodiazepines) can be dangerous or even fatal.? If I stop taking my medicines suddenly, I may have severe withdrawal symptoms.     The risks, benefits,  and side effects of these medicine(s) were explained to me. I agree that:    I will take part in other treatments as advised by my care team. This may be psychiatry or counseling, physical therapy, behavioral therapy, group treatment or a referral to specialist.    I will keep all my appointments and understand this is part of the monitoring of controlled substances.?My care team may require an office visit for EVERY controlled substance refill. If I miss appointments or don t follow instructions, my care team may stop my medicine    I will take my medicines as prescribed. I will not change the dose or schedule unless my care team tells me to. There will be no refills if I run out early.      I may be asked to come to the clinic and complete a urine drug test or complete a pill count. If I don t give a urine sample or participate in a pill count, the care team may stop my medicine.    I will only receive controlled substance prescriptions from this clinic. If I am treated by another provider, I will tell them that I am taking controlled substances and that I have a treatment agreement with this provider. I will inform my [facility] care team within one business day if I am given a prescription for any controlled substance by another healthcare provider. My [facility] care team can contact other providers and pharmacists about my use of any medicines.    It is up to me to make sure that I don't run out of my medicines on weekends or holidays.?If my care team is willing to refill my prescription without a visit, I must request refills only during office hours. Refills may take up to 3 business days to process. I will use one pharmacy to fill all my controlled substance prescriptions. I will notify the clinic about any changes to my insurance or medicine availability.    I am responsible for my prescriptions. If the medicine/prescription is lost, stolen or destroyed, it will not be replaced.?I also agree  not to share controlled substance medicines with anyone.     I am aware I should not use any illegal or recreational drugs. I agree not to drink alcohol unless my care team says I can.     If I enroll in the [place] [facility] Cannabis program, I will tell my care team before my next refill.    I will tell my care team right away if I become pregnant, have a new medical problem treated outside of my regular clinic, or have a change in my medicines.     I understand that this medicine can affect my thinking, judgment and reaction time.? Alcohol and drugs affect the brain and body, which can affect the safety of my driving. Being under the influence of alcohol or drugs can affect my decision-making, behaviors, personal safety and the safety of others. Driving while impaired (DWI) can occur if a person is driving, operating or in physical control of a car, motorcycle, boat, snowmobile, ATV, motorbike, off-road vehicle or any other motor vehicle ([place] Statute 169A.20). I understand the risk if I choose to drive or operate any vehicle or machinery.    I understand that if I do not follow any of the conditions above, my prescriptions or treatment may be stopped or changed.   I agree that my provider, clinic care team and pharmacy may work with any city, state or federal law enforcement agency that investigates the misuse, sale or other diversion of my controlled medicine. I will allow my provider to discuss my care with, or share a copy of, this agreement with any other treating provider, pharmacy or emergency room where I receive care.     I have read this agreement and have asked questions about anything I did not understand.    ________________________________________________________  Patient Signature - Janay WONG [facility] [place]     ___________________                   Date     ________________________________________________________  Provider Signature - KOJO Joseph CNP       ___________________                    Date     ________________________________________________________  Witness Signature (required if provider not present while patient signing)          ___________________                   Date

## 2023-11-14 NOTE — TELEPHONE ENCOUNTER
Valium prior auth denied   May have to go without   Without insurance is about #300 for 30 pills on [facility]

## 2023-11-14 NOTE — TELEPHONE ENCOUNTER
PRIOR AUTHORIZATION DENIED    Medication: DIAZEPAM 5 MG PO TABS  Insurance Company: [employer] - Phone [phone] Fax [phone]  Denial Date: 11/14/2023  Denial Rational:     Appeal Information:     Patient Notified: No

## 2023-11-14 NOTE — COMMUNITY RESOURCES LIST (ENGLISH)
11/14/2023   [facility]  N/A  For questions about this resource list or additional care needs, please contact your primary care clinic or care manager.  Phone: [phone]   Email: N/A   Address: [address] [place] [address]   Hours: N/A        Food and Nutrition       Food pantry  1  [facility] Food Shelf Distance: 0.77 miles      [facility]   [address] [place] [address]  Language: English  Hours: Wed 4:30 PM - 8:30 PM , Sun 9:30 AM - 12:30 PM  Fees: Free   Phone: (285) 532-9891 Email: info@os[employer]ames.com Website: http://www.osmogames.com/     2  [employer] [facility] Department of [employer]cl[employer] Work (DI) Distance: 1.33 miles      Delivery, [facility]   [address] [place] [address]  Language: English, Ojibwe, Citizen of [place]  Hours: Mon 1:00 PM - 6:00 PM , Tue - Thu 9:30 AM - 2:30 PM  Fees: Free   Phone: (367) 731-3047 Email: efe@[employer] Website: http://TV TubeX.SEWORKS/diw     SNAP application assistance  3  [facility] Distance: 2.17 miles      Phone/Virtual   [address] [place] [address]  Language: English, Hmong, French, Citizen of [place], Kyrgyz  Hours: Mon - Thu 8:00 AM - 5:30 PM , Fri 8:00 AM - 12:00 PM  Fees: Free   Phone: (914) 176-2452 Email: info@[facility].org Website: http://www.[employer].org     4  [facility] Distance: 3.2 miles      Phone/Virtual   [address] [place] [address]  Language: English  Hours: Mon - Thu 9:00 AM - 4:00 PM , Fri 9:00 AM - 2:00 PM , Sun 10:00 AM - 12:00 PM  Fees: Free   Phone: (188) 332-9129 Email: kwame@[facility].org Website: http://salvationarmynorth.org/community/pg-bcyz-yhrmj-ave/     [place] kitchen or free meals  5  [facility] [employer] [employer] - Loaves and Fishes Distance: 3.04 miles      Pickup   [address] [place] [address]  Language:  English  Hours: Wed 5:30 PM - 6:30 PM  Fees: Free   Phone: (960) 409-6071 Email: office@[facility].org Website: https://www.[facility].org     6  [facility] Distance: 3.2 miles      Pickup   [address] [place] [address]  Language: English  Hours: Mon - Fri 11:45 AM - 12:45 PM  Fees: Free   Phone: (611) 894-1980 Email: kwame@[facility] Website: http://[facility] The University of TexasHappy Kidz.org/Pending sale to [facility]/          Transportation       Free or low-cost transportation  7  [facility] Bus Loop - Free or low-cost transportation Distance: 3.9 miles      In-Person   [address] [place] [address]  Language: English  Hours: Mon - Fri 9:00 AM - 5:00 PM  Fees: Free   Phone: (894) 220-5556 Email: info@[employer] Website: https://www.[employer]/     8  [facility] - Free Bus and Gas Cards - Free or low-cost transportation Distance: 4.1 miles      Pickup   [address] [place] [address]  Language: English  Hours: Mon - Fri 9:00 AM - 4:00 PM , Sun 9:30 AM - 12:00 PM  Fees: Free   Phone: (929) 579-2903 Email: didi@[facility] TexasEden Rock Communications.[facility] Website: http://[employer].org/Pending sale to [facility]/[place]/     Transportation to medical appointments  9  [employer] Ride Distance: 0.68 miles      In-Person   [address] [address]  Language: English  Hours: Mon - Thu 6:00 AM - 6:00 PM , Fri 6:00 AM - 5:00 PM  Fees: Insurance, Self Pay   Phone: (512) 767-5237 Email: office@[employer]) Website: https://www.[employer] (IMMI)/     10  [facility] - Non-Emergency Medical Transportation Distance: 5.18 miles      In-Person   [address] [place] [address]  Language: English  Hours: Mon - Fri 8:00 AM - 4:00 PM Appt. Only  Fees: Self Pay   Phone: (253) 703-8899 Website:  http://www.[facility].org/Medical-Services/Emergency-medical-services/Non-emergency-transportation/          Important Numbers & Websites       Emergency Services   911  [facility] Services   311  Poison Control   (997) 234-3880  Suicide Prevention Lifeline   (431) 541-3571 (TALK)  Child Abuse Hotline   (251) 957-7224 (4-A-Child)  Sexual Assault Hotline   (560) 908-8274 (HOPE)  National Runaway Safeline   (178) 531-1831 (RUNAWAY)  All-Options Talkline   (748) 271-6714  Substance Abuse Referral   (331) 497-3799 (HELP)

## 2023-11-15 ENCOUNTER — PATIENT OUTREACH (OUTPATIENT)
Dept: CARE COORDINATION | Facility: CLINIC | Age: 83
End: 2023-11-15
Payer: COMMERCIAL

## 2023-11-15 LAB
ANION GAP SERPL CALCULATED.3IONS-SCNC: 13 MMOL/L (ref 7–15)
BUN SERPL-MCNC: 23.7 MG/DL (ref 8–23)
CALCIUM SERPL-MCNC: 10.2 MG/DL (ref 8.8–10.2)
CHLORIDE SERPL-SCNC: 104 MMOL/L (ref 98–107)
CHOLEST SERPL-MCNC: 205 MG/DL
CREAT SERPL-MCNC: 1.27 MG/DL (ref 0.51–0.95)
CREAT UR-MCNC: 79.8 MG/DL
DEPRECATED HCO3 PLAS-SCNC: 25 MMOL/L (ref 22–29)
EGFRCR SERPLBLD CKD-EPI 2021: 42 ML/MIN/1.73M2
GLUCOSE SERPL-MCNC: 91 MG/DL (ref 70–99)
HDLC SERPL-MCNC: 55 MG/DL
LDLC SERPL CALC-MCNC: 125 MG/DL
MICROALBUMIN UR-MCNC: <12 MG/L
MICROALBUMIN/CREAT UR: NORMAL MG/G{CREAT}
NONHDLC SERPL-MCNC: 150 MG/DL
POTASSIUM SERPL-SCNC: 4.4 MMOL/L (ref 3.4–5.3)
SODIUM SERPL-SCNC: 142 MMOL/L (ref 135–145)
TRIGL SERPL-MCNC: 127 MG/DL

## 2023-11-15 NOTE — PROGRESS NOTES
Clinic Care Coordination Contact  Community Health Worker Initial Outreach    CHW Initial Information Gathering:  Referral Source: PCP  Current living arrangement:: I live in a private home with family (Patient lives with daughter in-law for 9 months of the year. Patient then lives in [place] 3 months of the year)  Community Resources: None  Informal Support system:: Children  No PCP office visit in Past Year: No       Patient accepts CC: Yes. Patient scheduled for assessment with CC SW on 11/21/2023 at 2:00 PM. Patient noted desire to discuss [facility] Benefits, MA and Financial Assistance.     Sue's phone number is listed as priority contact for Patient    CHW was able to connect with the Patient's Ex daughter in-law Sue ([facility]). Sue informs that the Patient lives with her for 9 months out of the year and goes to [place] for 3 months every year. Sue notes that the Patient will be leaving at the end of November to go to [place].     Sue is wondering if the Patient qualifies for any financial assistance programs like SNAP. Notes that money is tight. Per Sue, the Patient only gets around 800 dollars a month from Social Security.       Ailyn YUEN Public Health  Community Health Worker  [facility] Clinics:  [place], [place] & [place]   Clinic Care Coordination  [phone]

## 2023-11-16 NOTE — TELEPHONE ENCOUNTER
Sent [employer] message to patient.    Óscar An, Triage RN Lambert Parkinson  10:08 AM 11/16/2023

## 2023-11-21 ENCOUNTER — PATIENT OUTREACH (OUTPATIENT)
Dept: NURSING | Facility: CLINIC | Age: 83
End: 2023-11-21
Payer: COMMERCIAL

## 2023-11-21 ASSESSMENT — ACTIVITIES OF DAILY LIVING (ADL): DEPENDENT_IADLS:: INDEPENDENT

## 2023-11-21 NOTE — PROGRESS NOTES
Clinic Care Coordination Contact  Clinic Care Coordination Contact  OUTREACH    Referral Information:  Referral Source: PCP  Primary Diagnosis: Psychosocial    Chief Complaint   Patient presents with    Clinic Care Coordination - Initial      Universal Utilization: Reviewed 11/21/2023  Clinic Utilization  Difficulty keeping appointments:: No  Compliance Concerns: No  No-Show Concerns: No  No PCP office visit in Past Year: No  Utilization      No Show Count (past year)  0             ED Visits  0             Hospital Admissions  0                    Current as of: 11/21/2023  1:10 PM              Clinical Concerns:  Current Medical Concerns:    Patient Active Problem List   Diagnosis    Benign essential hypertension    Esophageal reflux    Hyperlipidemia LDL goal <100    CKD (chronic kidney disease) stage 3, GFR 30-59 ml/min (H)    Benign paroxysmal positional vertigo    Morbid obesity (H)    Type 2 diabetes mellitus with stage 3b chronic kidney disease, without long-term current use of insulin (H)    Tracheal compression    Right thyroid nodule    Controlled substance agreement signed   Current Behavioral Concerns: none noted     Education Provided to patients family member, Sue: [facility] role and reason for call.   Pain  Pain (GOAL):: No  Health Maintenance Reviewed: Due/Overdue   Health Maintenance Due   Topic Date Due    PARATHYROID  Never done    PHOSPHORUS  Never done    ZOSTER IMMUNIZATION (1 of 2) Never done    RSV VACCINE (Pregnancy & 60+) (1 - 1-dose 60+ series) Never done    DIABETIC FOOT EXAM  11/20/2023   Clinical Pathway: None    Medication Management:  Medication review status: Medications reviewed and no changes reported per Sue.        Current Outpatient Medications   Medication    Acetaminophen 325 MG CAPS    Ascorbic Acid (VITAMIN C CR) 1000 MG TBCR    aspirin 81 MG tablet    benazepril-hydrochlorothiazide (LOTENSIN HCT) 20-25 MG tablet    blood glucose (NO BRAND SPECIFIED) lancets standard     blood glucose (NO BRAND SPECIFIED) test strip    Calcium Citrate-Vitamin D (CALCIUM CITRATE + D) 315-200 MG-UNIT TABS    diazepam (VALIUM) 5 MG tablet    glimepiride (AMARYL) 4 MG tablet    metoprolol succinate ER (TOPROL XL) 50 MG 24 hr tablet    Multiple Vitamin (DAILY MULTIVITAMIN PO)    pantoprazole (PROTONIX) 40 MG EC tablet    pioglitazone (ACTOS) 45 MG tablet    tolterodine (DETROL) 2 MG tablet     No current facility-administered medications for this visit.     Functional Status:  Dependent ADLs:: Independent  Dependent IADLs:: Independent  Bed or wheelchair confined:: No  Mobility Status: Independent    Living Situation:  Current living arrangement:: I live in a private home with family (Patient lives with daughter in-law for 9 months of the year. Patient then lives in [place] 3 months of the year)  Type of residence:: Private home - stairs    Lifestyle & Psychosocial Needs:  Social Determinants of Health     Food Insecurity: High Risk (11/14/2023)    Food Insecurity     Within the past 12 months, did you worry that your food would run out before you got money to buy more?: Yes     Within the past 12 months, did the food you bought just not last and you didn t have money to get more?: No   Depression: Not at risk (11/14/2023)    PHQ-2     PHQ-2 Score: 0   Housing Stability: Low Risk  (11/14/2023)    Housing Stability     Do you have housing? : Yes     Are you worried about losing your housing?: No   Tobacco Use: Medium Risk (11/14/2023)    Patient History     Smoking Tobacco Use: Former     Smokeless Tobacco Use: Never     Passive Exposure: Not on file   Financial Resource Strain: Low Risk  (11/14/2023)    Financial Resource Strain     Within the past 12 months, have you or your family members you live with been unable to get utilities (heat, electricity) when it was really needed?: No   Alcohol Use: Not on file   Transportation Needs: High Risk (11/14/2023)    Transportation Needs     Within the past 12  months, has lack of transportation kept you from medical appointments, getting your medicines, non-medical meetings or appointments, work, or from getting things that you need?: Yes   Physical Activity: Not on file   Interpersonal Safety: Not on file   Stress: Not on file   Social Connections: Not on file     Diet:: Regular  Inadequate nutrition (GOAL):: No  Tube Feeding: No  Inadequate activity/exercise (GOAL):: No  Significant changes in sleep pattern (GOAL): No  Transportation means:: Family, [place] mobility     Restorationism or spiritual beliefs that impact treatment:: No  Mental health DX:: No  Mental health management concern (GOAL):: No  Chemical Dependency Status: No Current Concerns  Informal Support system:: Family, Friends     [facility] spoke with patients ex-daughter in law, Sue, (consent to communicate on file) to introduce self and offer Care Coordination services. Per report, patient resides with [place] 6-9 months out of the year and the other 3-6 months patient resides in [place]. Per report, when patient is in the /[place] patient relies on Sue for housing and food assistance. Patient is on a fixed income (Social Security $800/month) and unable to afford to live alone. Sue interested in potential financial assistance options for patient to regain some independence and reduce financial strain. Briefly reviewed income requirements for county benefits (SNAP) and MA. Per report patient will not qualify for SNAP [facility], however, may qualify for MA. Reviewed application process and recommended patient apply upon return to  as patient will be leaving for [place] 11/29/2023. Sue expressed understanding. Sue shared patients biggest financial barrier is cost of medications (Detrol and Valium). Reviewed potential prescriptions assistance programs including Rx Outreach and/or Medicare Part D Extra Help. Sue expressed understanding. Offered to send additional information on above programs  via [employer]. Sue in agreement with plan. [facility] also included information on Medicare Savings Programs for patient/Sue to review. Sue plans to review resources and assist patient with applications as appropriate. Allowed time and space for Sue to voice any additional needs. No further CC needs identified at this time. Please refer to letter sent 11/21/2023 for further information regarding resources provided during this encounter. Sue thankful for call and voiced preference is to contact CC if/when additional needs arise.     Resources and Interventions:  Current Resources:   Community Resources: None  Advance Care Plan/Directive  Advanced Care Plans/Directives on file:: No  Discussed with patient/caregiver:: Declined Further Information  Referrals Placed: Financial Services     Care Plan: None    Plan: Patient/Sue were provided with  CC's contact information and encouraged to call with questions, concerns, and/or support needs.  CC will remain available as needed. No further care coordination outreaches will be made at this time.     HILDA Franks/[facility]  Social Work Care Coordinator  [facility], and [place]  Phone: [phone]

## 2023-11-21 NOTE — LETTER
[facility] CARE COORDINATION  [address]  [place] [address]    November 21, 2023      Janay Kyle  [address]   [facility] [place] [address]          Dear Janay/Sue,    I am a clinic care coordinator who works with KOJO Joseph CNP with the [facility]. I wanted to thank you for spending the time to talk with me.  Below is a description of clinic care coordination and how I can further assist you.       The clinic care coordination team is made up of a registered nurse, , financial resource worker and community health worker who understand the health care system. The goal of clinic care coordination is to help you manage your health and improve access to the health care system. Our team works alongside your provider to assist you in determining your health and social needs. We can help you obtain health care and community resources, providing you with necessary information and education. We can work with you through any barriers and develop a care plan that helps coordinate and strengthen the communication between you and your care team.  Our services are voluntary and are offered without charge to you personally.    Please feel free to contact me with any questions or concerns regarding care coordination and what we can offer.      We are focused on providing you with the highest-quality healthcare experience possible.    Sincerely,     HILDA Franks/TEOFILO  Social Work Care Coordinator  [facility] - [facility], and [place]  Phone: [phone]      Enclosed: I have enclosed the resources we discussed over the phone below. Please review and call me with any questions.      Resources    Medicare Assistance:   Medicare beneficiaries can qualify for Extra Help paying for their monthly premiums, annual deductibles, and co-payments related to Medicare prescription drug coverage.  To qualify for Extra Help, you must be  receiving Medicare and have limited resources and income. You must also reside in 1 of the  states or the [facility].  https://www-origin.ssa.gov/benefits/medicare/prescriptionhelp.html    Medicare Part D Extra Help  The Extra Help program helps people with limited income and resources lower or cut Part D costs. https://www.ssa.gov/medicare/part-d-extra-help  Medicare Part D provides drug coverage. The Extra Help program helps with the cost of your prescription drugs, like deductibles and copays. You can apply for Extra Help any time before or after you enroll in Part D.  Apply online at https://secure.ssa.gov/i1020/An060Fplc.action or call us at [phone] (TT [phone]) to apply over the phone. You can also request a paper application, or you can schedule an appointment to apply at your local Social Security office.    Medicare Savings Program  With the Medicare Savings Programs (MSPs), you can get help from your state to pay your Medicare premiums. In some cases, MSPs may also pay Medicare Part A (hospital insurance) and Medicare Part B (medical insurance) deductibles, coinsurance, and copayments if you meet certain conditions. If you qualify for certain MSPs, you automatically qualify to get Extra Help paying for Medicare prescription drug coverage.   Programs for people who need help with Medicare costs  https://[place].gov/dhs/people-we-serve/seniors/health-care/health-care-programs/programs-and-services/help-with-medicare-costs.jsp  How do I apply for Medicare Savings Programs (MSPs)?   Complete the [place] Health Care Programs Application for Certain Populations DHS-3876 (PDF) and return it to your county or [place] office.  https://edocs.dhs.state.[place].us/lfserver/Public/DHS-3876-ENG  If you cannot print out the application, call your county or [place] office and ask to have it mailed to you.  Can I get Medical Assistance (MA) at the same time?  Yes, the county or [place] office will review  your application to see if you qualify for both Medical Assistance and Medicare Savings Programs.      Rx Outreach:   Rx Outreach is a nonprofit pharmacy committed to helping people get the medications they need at an affordable price.  Find your medication at https://rxoutreach.org/find-your-medication/  Enroll at https://rxoutreach.org/enrollment/

## 2024-06-16 ENCOUNTER — HEALTH MAINTENANCE LETTER (OUTPATIENT)
Age: 84
End: 2024-06-16

## 2024-09-11 ENCOUNTER — MYC REFILL (OUTPATIENT)
Dept: INTERNAL MEDICINE | Facility: CLINIC | Age: 84
End: 2024-09-11
Payer: COMMERCIAL

## 2024-09-11 ENCOUNTER — MYC MEDICAL ADVICE (OUTPATIENT)
Dept: INTERNAL MEDICINE | Facility: CLINIC | Age: 84
End: 2024-09-11
Payer: COMMERCIAL

## 2024-09-11 DIAGNOSIS — I10 BENIGN ESSENTIAL HYPERTENSION: ICD-10-CM

## 2024-09-11 RX ORDER — METOPROLOL SUCCINATE 50 MG/1
100 TABLET, EXTENDED RELEASE ORAL DAILY
Qty: 90 TABLET | Refills: 3 | OUTPATIENT
Start: 2024-09-11

## 2024-09-12 RX ORDER — METOPROLOL SUCCINATE 50 MG/1
100 TABLET, EXTENDED RELEASE ORAL DAILY
Qty: 180 TABLET | Refills: 0 | Status: SHIPPED | OUTPATIENT
Start: 2024-09-12

## 2024-11-11 ENCOUNTER — TRANSFERRED RECORDS (OUTPATIENT)
Dept: HEALTH INFORMATION MANAGEMENT | Facility: CLINIC | Age: 84
End: 2024-11-11
Payer: COMMERCIAL

## 2024-11-11 LAB — RETINOPATHY: NEGATIVE

## 2024-11-22 ENCOUNTER — TELEPHONE (OUTPATIENT)
Dept: FAMILY MEDICINE | Facility: CLINIC | Age: 84
End: 2024-11-22
Payer: COMMERCIAL

## 2024-11-22 DIAGNOSIS — G47.09 OTHER INSOMNIA: Primary | ICD-10-CM

## 2024-11-22 NOTE — Clinical Note
Diazepam (Valium) not covered by insurance, I sent an alternative called lorazepam to take every 8 hours as needed for anxiety or sleep.

## 2024-11-22 NOTE — TELEPHONE ENCOUNTER
Retail Pharmacy Prior Authorization Team   Phone: [phone]    PRIOR AUTHORIZATION DENIED    Medication: DIAZEPAM 5 MG PO TABS  Insurance Company: [employer] - Phone [phone] Fax [phone]  Denial Date: 11/22/2024  Denial Reason(s): MUST BE USED FOR A MEDICALLY ACCEPTED INDICATION      Appeal Information: IF THE PROVIDER WOULD LIKE TO APPEAL THIS DECISION PLEASE PROVIDE THE PA TEAM WITH A LETTER OF MEDICAL NECESSITY      Patient Notified: NO

## 2024-11-26 RX ORDER — LORAZEPAM 1 MG/1
1 TABLET ORAL EVERY 8 HOURS PRN
Qty: 30 TABLET | Refills: 0 | Status: SHIPPED | OUTPATIENT
Start: 2024-11-26

## 2024-12-04 DIAGNOSIS — R25.2 LEG CRAMPING: ICD-10-CM

## 2024-12-04 DIAGNOSIS — M54.50 LOW BACK PAIN WITHOUT SCIATICA, UNSPECIFIED BACK PAIN LATERALITY, UNSPECIFIED CHRONICITY: ICD-10-CM

## 2024-12-04 DIAGNOSIS — I10 BENIGN ESSENTIAL HYPERTENSION: ICD-10-CM

## 2024-12-04 RX ORDER — GABAPENTIN 100 MG/1
100 CAPSULE ORAL 2 TIMES DAILY
Qty: 180 CAPSULE | Refills: 0 | Status: SHIPPED | OUTPATIENT
Start: 2024-12-04

## 2024-12-04 RX ORDER — METOPROLOL SUCCINATE 50 MG/1
100 TABLET, EXTENDED RELEASE ORAL DAILY
Qty: 180 TABLET | Refills: 0 | Status: SHIPPED | OUTPATIENT
Start: 2024-12-04

## 2024-12-07 DIAGNOSIS — R42 VERTIGO: ICD-10-CM

## 2024-12-09 RX ORDER — DIAZEPAM 5 MG/1
TABLET ORAL
Qty: 20 TABLET | OUTPATIENT
Start: 2024-12-09

## 2024-12-09 NOTE — TELEPHONE ENCOUNTER
She just got Valium and lorazepam filled in the end of November.  She should not need a refill on this and it appears she is getting it from other people

## 2024-12-29 ENCOUNTER — HEALTH MAINTENANCE LETTER (OUTPATIENT)
Age: 84
End: 2024-12-29

## 2025-02-04 ENCOUNTER — PATIENT OUTREACH (OUTPATIENT)
Dept: INTERNAL MEDICINE | Facility: CLINIC | Age: 85
End: 2025-02-04
Payer: COMMERCIAL

## 2025-02-04 NOTE — TELEPHONE ENCOUNTER
Patient Quality Outreach    Patient is due for the following:   Physical Preventive Adult Physical    Action(s) Taken:   Patient has upcoming appointment, these items will be addressed at that time.    Type of outreach:    Chart review performed, no outreach needed.    Questions for provider review:    None           Magaly Hinson LPN  Chart routed to none.

## 2025-05-13 ENCOUNTER — PATIENT OUTREACH (OUTPATIENT)
Dept: CARE COORDINATION | Facility: CLINIC | Age: 85
End: 2025-05-13
Payer: COMMERCIAL

## 2025-05-31 ENCOUNTER — HEALTH MAINTENANCE LETTER (OUTPATIENT)
Age: 85
End: 2025-05-31

## 2025-06-27 DIAGNOSIS — M54.50 LOW BACK PAIN WITHOUT SCIATICA, UNSPECIFIED BACK PAIN LATERALITY, UNSPECIFIED CHRONICITY: ICD-10-CM

## 2025-06-27 DIAGNOSIS — I10 BENIGN ESSENTIAL HYPERTENSION: ICD-10-CM

## 2025-06-27 DIAGNOSIS — R25.2 LEG CRAMPING: ICD-10-CM

## 2025-06-29 NOTE — TELEPHONE ENCOUNTER
Clinic RN: Please contact patient because patient should have run out of this medication on MARCH 2025. Confirm patient is taking this medication as prescribed. Document findings and route refill encounter to provider for approval or denial.

## 2025-07-01 ENCOUNTER — OFFICE VISIT (OUTPATIENT)
Dept: FAMILY MEDICINE | Facility: CLINIC | Age: 85
End: 2025-07-01
Payer: COMMERCIAL

## 2025-07-01 VITALS
WEIGHT: 187 LBS | OXYGEN SATURATION: 100 % | DIASTOLIC BLOOD PRESSURE: 71 MMHG | BODY MASS INDEX: 36.71 KG/M2 | TEMPERATURE: 97.6 F | RESPIRATION RATE: 16 BRPM | HEART RATE: 73 BPM | SYSTOLIC BLOOD PRESSURE: 112 MMHG

## 2025-07-01 DIAGNOSIS — K21.9 GASTROESOPHAGEAL REFLUX DISEASE WITHOUT ESOPHAGITIS: ICD-10-CM

## 2025-07-01 DIAGNOSIS — I10 BENIGN ESSENTIAL HYPERTENSION: ICD-10-CM

## 2025-07-01 DIAGNOSIS — N18.31 STAGE 3A CHRONIC KIDNEY DISEASE (H): ICD-10-CM

## 2025-07-01 DIAGNOSIS — E11.22 TYPE 2 DIABETES MELLITUS WITH STAGE 3B CHRONIC KIDNEY DISEASE, WITHOUT LONG-TERM CURRENT USE OF INSULIN (H): Primary | ICD-10-CM

## 2025-07-01 DIAGNOSIS — M79.89 SWELLING OF LIMB: ICD-10-CM

## 2025-07-01 DIAGNOSIS — R06.2 WHEEZING: ICD-10-CM

## 2025-07-01 DIAGNOSIS — Z23 NEED FOR VACCINATION: ICD-10-CM

## 2025-07-01 DIAGNOSIS — M54.50 LOW BACK PAIN WITHOUT SCIATICA, UNSPECIFIED BACK PAIN LATERALITY, UNSPECIFIED CHRONICITY: ICD-10-CM

## 2025-07-01 DIAGNOSIS — R25.2 LEG CRAMPING: ICD-10-CM

## 2025-07-01 DIAGNOSIS — E66.01 MORBID OBESITY (H): ICD-10-CM

## 2025-07-01 DIAGNOSIS — N18.32 TYPE 2 DIABETES MELLITUS WITH STAGE 3B CHRONIC KIDNEY DISEASE, WITHOUT LONG-TERM CURRENT USE OF INSULIN (H): Primary | ICD-10-CM

## 2025-07-01 LAB
ERYTHROCYTE [DISTWIDTH] IN BLOOD BY AUTOMATED COUNT: 16.8 % (ref 10–15)
EST. AVERAGE GLUCOSE BLD GHB EST-MCNC: 146 MG/DL
HBA1C MFR BLD: 6.7 % (ref 0–5.6)
HCT VFR BLD AUTO: 37.7 % (ref 35–47)
HGB BLD-MCNC: 11.9 G/DL (ref 11.7–15.7)
MCH RBC QN AUTO: 26.7 PG (ref 26.5–33)
MCHC RBC AUTO-ENTMCNC: 31.6 G/DL (ref 31.5–36.5)
MCV RBC AUTO: 85 FL (ref 78–100)
PLATELET # BLD AUTO: 244 10E3/UL (ref 150–450)
RBC # BLD AUTO: 4.45 10E6/UL (ref 3.8–5.2)
WBC # BLD AUTO: 7.7 10E3/UL (ref 4–11)

## 2025-07-01 PROCEDURE — 99215 OFFICE O/P EST HI 40 MIN: CPT | Performed by: FAMILY MEDICINE

## 2025-07-01 PROCEDURE — 83880 ASSAY OF NATRIURETIC PEPTIDE: CPT | Performed by: FAMILY MEDICINE

## 2025-07-01 PROCEDURE — 36415 COLL VENOUS BLD VENIPUNCTURE: CPT | Performed by: FAMILY MEDICINE

## 2025-07-01 PROCEDURE — 83036 HEMOGLOBIN GLYCOSYLATED A1C: CPT | Performed by: FAMILY MEDICINE

## 2025-07-01 PROCEDURE — 80061 LIPID PANEL: CPT | Performed by: FAMILY MEDICINE

## 2025-07-01 PROCEDURE — 82570 ASSAY OF URINE CREATININE: CPT | Performed by: FAMILY MEDICINE

## 2025-07-01 PROCEDURE — 3078F DIAST BP <80 MM HG: CPT | Performed by: FAMILY MEDICINE

## 2025-07-01 PROCEDURE — 3049F LDL-C 100-129 MG/DL: CPT | Performed by: FAMILY MEDICINE

## 2025-07-01 PROCEDURE — 82043 UR ALBUMIN QUANTITATIVE: CPT | Performed by: FAMILY MEDICINE

## 2025-07-01 PROCEDURE — 80053 COMPREHEN METABOLIC PANEL: CPT | Performed by: FAMILY MEDICINE

## 2025-07-01 PROCEDURE — 85027 COMPLETE CBC AUTOMATED: CPT | Performed by: FAMILY MEDICINE

## 2025-07-01 PROCEDURE — G2211 COMPLEX E/M VISIT ADD ON: HCPCS | Performed by: FAMILY MEDICINE

## 2025-07-01 PROCEDURE — 3044F HG A1C LEVEL LT 7.0%: CPT | Performed by: FAMILY MEDICINE

## 2025-07-01 PROCEDURE — 3074F SYST BP LT 130 MM HG: CPT | Performed by: FAMILY MEDICINE

## 2025-07-01 RX ORDER — GABAPENTIN 100 MG/1
100 CAPSULE ORAL 2 TIMES DAILY
Qty: 180 CAPSULE | Refills: 0 | Status: SHIPPED | OUTPATIENT
Start: 2025-07-01

## 2025-07-01 RX ORDER — METOPROLOL SUCCINATE 50 MG/1
100 TABLET, EXTENDED RELEASE ORAL DAILY
Qty: 180 TABLET | Refills: 0 | OUTPATIENT
Start: 2025-07-01

## 2025-07-01 RX ORDER — BENAZEPRIL/HYDROCHLOROTHIAZIDE 20 MG-25MG
1 TABLET ORAL DAILY
Qty: 90 TABLET | Refills: 3 | Status: SHIPPED | OUTPATIENT
Start: 2025-07-01

## 2025-07-01 RX ORDER — METOPROLOL SUCCINATE 50 MG/1
100 TABLET, EXTENDED RELEASE ORAL DAILY
Qty: 180 TABLET | Refills: 0 | Status: SHIPPED | OUTPATIENT
Start: 2025-07-01

## 2025-07-01 RX ORDER — GABAPENTIN 100 MG/1
100 CAPSULE ORAL 2 TIMES DAILY
Qty: 180 CAPSULE | Refills: 0 | OUTPATIENT
Start: 2025-07-01

## 2025-07-01 RX ORDER — GLIMEPIRIDE 4 MG/1
8 TABLET ORAL
Qty: 180 TABLET | Refills: 3 | Status: SHIPPED | OUTPATIENT
Start: 2025-07-01

## 2025-07-01 RX ORDER — FAMOTIDINE 40 MG/1
40 TABLET, FILM COATED ORAL DAILY
Qty: 90 TABLET | Refills: 1 | Status: SHIPPED | OUTPATIENT
Start: 2025-07-01

## 2025-07-01 ASSESSMENT — ENCOUNTER SYMPTOMS: BACK PAIN: 1

## 2025-07-01 NOTE — PROGRESS NOTES
Assessment & Plan     Type 2 diabetes mellitus with stage 3b chronic kidney disease, without long-term current use of insulin (H)  A1c within goal, she will continue healthy lifestyle changes, will hold Actos because of swollen legs and substituted with Jardiance pending insurance coverage.  - HEMOGLOBIN A1C; Future  - Albumin Random Urine Quantitative with Creat Ratio; Future  - NT-proBNP; Future  - Comprehensive metabolic panel; Future  - Lipid panel reflex to direct LDL Fasting; Future  - CBC with platelets; Future    - empagliflozin (JARDIANCE) 10 MG TABS tablet; Take 1 tablet (10 mg) by mouth daily.    Swelling of limb  Differential diagnosis discussed, proBNP was normal, limb ultrasound ordered to rule out DVT, result pending,(Had 1 done in [place] but was at the upper thigh area), discussed possibility of venous insufficiency, discussed compression stocking,legs  elevation etc.  - NT-proBNP; Future  - US Lower Extremity Venous Duplex Bilateral; Future  - Compression Sleeve/Stocking Order for DME - ONLY FOR DME  - NT-proBNP    Need for vaccination  RSV, shingle vaccine discussed, she will get those at the local [facility] because of Medicare.    Morbid obesity (H)  Discussed healthy lifestyle changes weight loss program.    Benign essential hypertension  Discussed healthy lifestyle changes, continue current medical management monitor blood pressure at home.  - benazepril-hydrochlorothiazide (LOTENSIN HCT) 20-25 MG tablet; Take 1 tablet by mouth daily.  - metoprolol succinate ER (TOPROL XL) 50 MG 24 hr tablet; Take 2 tablets (100 mg) by mouth daily.    Stage 3a chronic kidney disease (H)  Discussed tight control risk factor include diabetes type 2, hypertension, Jardiance prescribed for additional renal protection.  - benazepril-hydrochlorothiazide (LOTENSIN HCT) 20-25 MG tablet; Take 1 tablet by mouth daily.  - empagliflozin (JARDIANCE) 10 MG TABS tablet; Take 1 tablet (10 mg) by mouth daily.    Low back pain  "without sciatica, unspecified back pain laterality, unspecified chronicity  Degenerative joint disease lumbar spine, was offered steroid injection in the past but declined it, controlling symptoms with gabapentin.  - gabapentin (NEURONTIN) 100 MG capsule; Take 1 capsule (100 mg) by mouth 2 times daily.    Leg cramping    - gabapentin (NEURONTIN) 100 MG capsule; Take 1 capsule (100 mg) by mouth 2 times daily.    Gastroesophageal reflux disease without esophagitis  Controlled with Pepcid.  - famotidine (PEPCID) 40 MG tablet; Take 1 tablet (40 mg) by mouth daily.    Wheezing    - NT-proBNP; Future  - NT-proBNP      Review of external notes as documented elsewhere in note  41 minutes spent by me on the date of the encounter doing chart review, review of outside records, review of test results, interpretation of tests, patient visit, and documentation The longitudinal plan of care for the diagnosis(es)/condition(s) as documented were addressed during this visit. Due to the added complexity in care, I will continue to support Janay in the subsequent management and with ongoing continuity of care.    BMI  Estimated body mass index is 36.71 kg/m  as calculated from the following:    Height as of 11/21/24: 1.52 m (4' 11.84\").    Weight as of this encounter: 84.8 kg (187 lb).   Weight management plan: Discussed healthy diet and exercise guidelines      Follow-up   Return in about 6 weeks (around 8/12/2025) for AWE.        Subjective   Janay is a 84 year old, presenting for the following health issues:  Back Pain (State that the pain started from the back down to left calf. X 6 months.) and Foot Swelling      7/1/2025     2:54 PM   Additional Questions   Roomed by Yancy MORENO   Accompanied by Daughter-in-law     Back Pain     History of Present Illness       Back Pain:  She presents for follow up of back pain. Patient's back pain is a recurring problem.  Location of back pain:  Left lower back  Description of back pain: dull " ache  Back pain spreads: left knee    Since patient first noticed back pain, pain is: unchanged  Does back pain interfere with her job:  No    She is missing 2 dose(s) of medications per week.        She usually spend the winter in [place], came back a few weeks ago, she stated that she had left calf pain Dwn there in [place], she was sent to the hospital, DVT was ruled out, she did follow-up with a cardiologist was told that nothing was wrong with her heart, that she has a sciatica.  Has a well-known history of chronic lower back pain was even offered steroid injection in the past but she declined it.  She is accompanied by her daughter-in-law Sue who is doing most of the talk, diabetes has been overall well-controlled, she takes Actos, glimepiride, she is on gabapentin for nerve pain.  Very rarely uses lorazepam, metoprolol and Lotensin help control her blood pressure;Detrol LA help control her urinary incontinence.She also mention an episode of wheezing in [place], but improved.  She has albuterol to use as needed.      Review of Systems  Constitutional, HEENT, cardiovascular, pulmonary, gi and gu systems are negative, except as otherwise noted.      Objective    /71 (BP Location: Left arm, Patient Position: Sitting, Cuff Size: Adult Large)   Pulse 73   Temp 97.6  F (36.4  C) (Oral)   Resp 16   Wt 84.8 kg (187 lb)   LMP  (LMP Unknown)   SpO2 100%   BMI 36.71 kg/m    Body mass index is 36.71 kg/m .  Physical Exam   GENERAL: alert and no distress  NECK: no adenopathy, no asymmetry, masses, or scars  RESP: lungs clear to auscultation - no rales, rhonchi or wheezes  CV: regular rate and rhythm, normal S1 S2, no S3 or S4, no murmur, click or rub, no peripheral edema  ABDOMEN: Obese, no specific point tenderness, no guarding or rebound.  MS: Bilateral lower extremity swelling 1+ edema, mild left calf tenderness compared to right.    Results for orders placed or performed in visit on 07/01/25    HEMOGLOBIN A1C     Status: Abnormal   Result Value Ref Range    Estimated Average Glucose 146 (H) <117 mg/dL    Hemoglobin A1C 6.7 (H) 0.0 - 5.6 %   NT-proBNP     Status: Normal   Result Value Ref Range    NT-proBNP 314 0 - 624 pg/mL   Comprehensive metabolic panel     Status: Abnormal   Result Value Ref Range    Sodium 141 135 - 145 mmol/L    Potassium 3.9 3.4 - 5.3 mmol/L    Carbon Dioxide (CO2) 24 22 - 29 mmol/L    Anion Gap 12 7 - 15 mmol/L    Urea Nitrogen 26.4 (H) 8.0 - 23.0 mg/dL    Creatinine 1.31 (H) 0.51 - 0.95 mg/dL    GFR Estimate 40 (L) >60 mL/min/1.73m2    Calcium 9.7 8.8 - 10.4 mg/dL    Chloride 105 98 - 107 mmol/L    Glucose 61 (L) 70 - 99 mg/dL    Alkaline Phosphatase 53 40 - 150 U/L    AST 25 0 - 45 U/L    ALT 12 0 - 50 U/L    Protein Total 7.2 6.4 - 8.3 g/dL    Albumin 4.0 3.5 - 5.2 g/dL    Bilirubin Total <0.2 <=1.2 mg/dL    Patient Fasting > 8hrs? No    Lipid panel reflex to direct LDL Fasting     Status: Abnormal   Result Value Ref Range    Cholesterol 211 (H) <200 mg/dL    Triglycerides 140 <150 mg/dL    Direct Measure HDL 55 >=50 mg/dL    LDL Cholesterol Calculated 128 (H) <100 mg/dL    Non HDL Cholesterol 156 (H) <130 mg/dL    Patient Fasting > 8hrs? No     Narrative    Cholesterol  Desirable: < 200 mg/dL  Borderline High: 200 - 239 mg/dL  High: >= 240 mg/dL    Triglycerides  Normal: < 150 mg/dL  Borderline High: 150 - 199 mg/dL  High: 200-499 mg/dL  Very High: >= 500 mg/dL    Direct Measure HDL  Female: >= 50 mg/dL   Male: >= 40 mg/dL    LDL Cholesterol  Desirable: < 100 mg/dL  Above Desirable: 100 - 129 mg/dL   Borderline High: 130 - 159 mg/dL   High:  160 - 189 mg/dL   Very High: >= 190 mg/dL    Non HDL Cholesterol  Desirable: < 130 mg/dL  Above Desirable: 130 - 159 mg/dL  Borderline High: 160 - 189 mg/dL  High: 190 - 219 mg/dL  Very High: >= 220 mg/dL   CBC with platelets     Status: Abnormal   Result Value Ref Range    WBC Count 7.7 4.0 - 11.0 10e3/uL    RBC Count 4.45 3.80 - 5.20  10e6/uL    Hemoglobin 11.9 11.7 - 15.7 g/dL    Hematocrit 37.7 35.0 - 47.0 %    MCV 85 78 - 100 fL    MCH 26.7 26.5 - 33.0 pg    MCHC 31.6 31.5 - 36.5 g/dL    RDW 16.8 (H) 10.0 - 15.0 %    Platelet Count 244 150 - 450 10e3/uL         This note was completed in part using a voice recognition software, any grammatical or context distortion are unintentional and inherent to the software.    Signed Electronically by: Victor Manuel Erwin MD

## 2025-07-01 NOTE — TELEPHONE ENCOUNTER
Patient was seen in clinic today, 7/1/25 by PCP, Dr Erwin and medication already refilled  Rx duplicate    Guera Mcdonald RN  [facility]

## 2025-07-01 NOTE — PROGRESS NOTES
Prior to immunization administration, verified patients identity using patient s name and date of birth. Please see Immunization Activity for additional information.     Screening Questionnaire for Adult Immunization    Are you sick today?   No   Do you have allergies to medications, food, a vaccine component or latex?   Yes   Have you ever had a serious reaction after receiving a vaccination?   No   Do you have a long-term health problem with heart, lung, kidney, or metabolic disease (e.g., diabetes), asthma, a blood disorder, no spleen, complement component deficiency, a cochlear implant, or a spinal fluid leak?  Are you on long-term aspirin therapy?   Yes   Do you have cancer, leukemia, HIV/AIDS, or any other immune system problem?   No   Do you have a parent, brother, or sister with an immune system problem?   No   In the past 3 months, have you taken medications that affect  your immune system, such as prednisone, other steroids, or anticancer drugs; drugs for the treatment of rheumatoid arthritis, Crohn s disease, or psoriasis; or have you had radiation treatments?   No   Have you had a seizure, or a brain or other nervous system problem?   No   During the past year, have you received a transfusion of blood or blood    products, or been given immune (gamma) globulin or antiviral drug?   No   For women: Are you pregnant or is there a chance you could become       pregnant during the next month?   No   Have you received any vaccinations in the past 4 weeks?   No     Immunization questionnaire was positive for at least one answer.  Notified provider.      Patient instructed to remain in clinic for 15 minutes afterwards, and to report any adverse reactions.     Screening performed by Yancy Fitzgerald MA on 7/1/2025 at 3:00 PM.        Answers submitted by the patient for this visit:  Back Pain Visit Questionnaire (Submitted on 7/1/2025)  Your back pain is: recurring  Chronic or Recurring Back Pain Visit Questionnaire  (Submitted on 7/1/2025)  Where is your back pain located? : left lower back  How would you describe your back pain? : dull ache  Where does your back pain spread? : left knee  Since you noticed your back pain, how has it changed? : unchanged  Does your back pain interfere with your job?: No  If yes, which:: Physical Therapy  General Questionnaire (Submitted on 7/1/2025)  Chief Complaint: Chronic problems general questions HPI Form  How many days per week do you miss taking your medication?: 2  Questionnaire about: Chronic problems general questions HPI Form (Submitted on 7/1/2025)  Chief Complaint: Chronic problems general questions HPI Form

## 2025-07-02 ENCOUNTER — RESULTS FOLLOW-UP (OUTPATIENT)
Dept: FAMILY MEDICINE | Facility: CLINIC | Age: 85
End: 2025-07-02
Payer: COMMERCIAL

## 2025-07-02 LAB
ALBUMIN SERPL BCG-MCNC: 4 G/DL (ref 3.5–5.2)
ALP SERPL-CCNC: 53 U/L (ref 40–150)
ALT SERPL W P-5'-P-CCNC: 12 U/L (ref 0–50)
ANION GAP SERPL CALCULATED.3IONS-SCNC: 12 MMOL/L (ref 7–15)
AST SERPL W P-5'-P-CCNC: 25 U/L (ref 0–45)
BILIRUB SERPL-MCNC: <0.2 MG/DL
BUN SERPL-MCNC: 26.4 MG/DL (ref 8–23)
CALCIUM SERPL-MCNC: 9.7 MG/DL (ref 8.8–10.4)
CHLORIDE SERPL-SCNC: 105 MMOL/L (ref 98–107)
CHOLEST SERPL-MCNC: 211 MG/DL
CREAT SERPL-MCNC: 1.31 MG/DL (ref 0.51–0.95)
CREAT UR-MCNC: 115 MG/DL
EGFRCR SERPLBLD CKD-EPI 2021: 40 ML/MIN/1.73M2
FASTING STATUS PATIENT QL REPORTED: NO
FASTING STATUS PATIENT QL REPORTED: NO
GLUCOSE SERPL-MCNC: 61 MG/DL (ref 70–99)
HCO3 SERPL-SCNC: 24 MMOL/L (ref 22–29)
HDLC SERPL-MCNC: 55 MG/DL
LDLC SERPL CALC-MCNC: 128 MG/DL
MICROALBUMIN UR-MCNC: <12 MG/L
MICROALBUMIN/CREAT UR: NORMAL MG/G{CREAT}
NONHDLC SERPL-MCNC: 156 MG/DL
NT-PROBNP SERPL-MCNC: 314 PG/ML (ref 0–624)
POTASSIUM SERPL-SCNC: 3.9 MMOL/L (ref 3.4–5.3)
PROT SERPL-MCNC: 7.2 G/DL (ref 6.4–8.3)
SODIUM SERPL-SCNC: 141 MMOL/L (ref 135–145)
TRIGL SERPL-MCNC: 140 MG/DL

## 2025-07-02 NOTE — TELEPHONE ENCOUNTER
Called patient, phone number on file belongs to daughter-in-law Sue.  Patient available to give verbal CTC, as document in chart is .  Relayed result message.  Sue verbalizes understanding and agreement with plan, no questions at this time.    Odilia Berry RN  [facility]

## 2025-07-28 ENCOUNTER — HOSPITAL ENCOUNTER (OUTPATIENT)
Dept: ULTRASOUND IMAGING | Facility: HOSPITAL | Age: 85
Discharge: HOME | End: 2025-07-28
Attending: FAMILY MEDICINE | Admitting: FAMILY MEDICINE
Payer: COMMERCIAL

## 2025-07-28 DIAGNOSIS — M79.89 SWELLING OF LIMB: ICD-10-CM

## 2025-07-28 PROCEDURE — 93970 EXTREMITY STUDY: CPT

## (undated) RX ORDER — REGADENOSON 0.08 MG/ML
INJECTION, SOLUTION INTRAVENOUS
Status: DISPENSED
Start: 2019-05-22